# Patient Record
Sex: MALE | Race: BLACK OR AFRICAN AMERICAN | NOT HISPANIC OR LATINO
[De-identification: names, ages, dates, MRNs, and addresses within clinical notes are randomized per-mention and may not be internally consistent; named-entity substitution may affect disease eponyms.]

---

## 2015-05-18 RX ORDER — CARVEDILOL PHOSPHATE 80 MG/1
1 CAPSULE, EXTENDED RELEASE ORAL
Qty: 0 | Refills: 0 | DISCHARGE
Start: 2015-05-18

## 2018-01-18 ENCOUNTER — RESULT REVIEW (OUTPATIENT)
Age: 68
End: 2018-01-18

## 2018-02-02 ENCOUNTER — APPOINTMENT (OUTPATIENT)
Dept: VASCULAR SURGERY | Facility: CLINIC | Age: 68
End: 2018-02-02
Payer: MEDICARE

## 2018-02-02 VITALS
HEART RATE: 74 BPM | DIASTOLIC BLOOD PRESSURE: 82 MMHG | TEMPERATURE: 98.7 F | HEIGHT: 68 IN | WEIGHT: 143 LBS | BODY MASS INDEX: 21.67 KG/M2 | RESPIRATION RATE: 17 BRPM | SYSTOLIC BLOOD PRESSURE: 147 MMHG

## 2018-02-02 DIAGNOSIS — Z86.2 PERSONAL HISTORY OF DISEASES OF THE BLOOD AND BLOOD-FORMING ORGANS AND CERTAIN DISORDERS INVOLVING THE IMMUNE MECHANISM: ICD-10-CM

## 2018-02-02 DIAGNOSIS — Z87.09 PERSONAL HISTORY OF OTHER DISEASES OF THE RESPIRATORY SYSTEM: ICD-10-CM

## 2018-02-02 DIAGNOSIS — R51 HEADACHE: ICD-10-CM

## 2018-02-02 DIAGNOSIS — I10 ESSENTIAL (PRIMARY) HYPERTENSION: ICD-10-CM

## 2018-02-02 DIAGNOSIS — Z86.39 PERSONAL HISTORY OF OTHER ENDOCRINE, NUTRITIONAL AND METABOLIC DISEASE: ICD-10-CM

## 2018-02-02 PROCEDURE — 99204 OFFICE O/P NEW MOD 45 MIN: CPT

## 2018-02-02 RX ORDER — FOLIC ACID 1 MG/1
1 TABLET ORAL
Qty: 90 | Refills: 0 | Status: ACTIVE | COMMUNITY
Start: 2017-01-19

## 2018-02-02 RX ORDER — BUDESONIDE AND FORMOTEROL FUMARATE DIHYDRATE 160; 4.5 UG/1; UG/1
160-4.5 AEROSOL RESPIRATORY (INHALATION)
Qty: 10 | Refills: 0 | Status: ACTIVE | COMMUNITY
Start: 2017-12-21

## 2018-02-02 RX ORDER — ALBUTEROL SULFATE 108 UG/1
108 (90 BASE) AEROSOL, METERED RESPIRATORY (INHALATION)
Refills: 0 | Status: ACTIVE | COMMUNITY

## 2018-02-02 RX ORDER — ACETAMINOPHEN AND CODEINE 300; 30 MG/1; MG/1
300-30 TABLET ORAL
Qty: 60 | Refills: 0 | Status: ACTIVE | COMMUNITY
Start: 2018-01-17

## 2018-02-02 RX ORDER — ISOPROPYL ALCOHOL 70 ML/100ML
70 SWAB TOPICAL
Qty: 200 | Refills: 0 | Status: ACTIVE | COMMUNITY
Start: 2016-11-22

## 2018-02-02 RX ORDER — MONTELUKAST 10 MG/1
10 TABLET, FILM COATED ORAL
Qty: 90 | Refills: 0 | Status: ACTIVE | COMMUNITY
Start: 2017-10-05

## 2018-02-02 RX ORDER — DIPHENOXYLATE HYDROCHLORIDE AND ATROPINE SULFATE 2.5; .025 MG/1; MG/1
2.5-0.025 TABLET ORAL
Qty: 90 | Refills: 0 | Status: ACTIVE | COMMUNITY
Start: 2018-01-17

## 2018-02-02 RX ORDER — ATORVASTATIN CALCIUM 40 MG/1
40 TABLET, FILM COATED ORAL
Qty: 90 | Refills: 0 | Status: ACTIVE | COMMUNITY
Start: 2017-11-09

## 2018-02-02 RX ORDER — CARVEDILOL 6.25 MG/1
6.25 TABLET, FILM COATED ORAL
Qty: 180 | Refills: 0 | Status: ACTIVE | COMMUNITY
Start: 2017-08-17

## 2018-02-02 RX ORDER — LANCETS 28 GAUGE
EACH MISCELLANEOUS
Qty: 200 | Refills: 0 | Status: ACTIVE | COMMUNITY
Start: 2017-12-07

## 2018-02-02 RX ORDER — LATANOPROST/PF 0.005 %
0.01 DROPS OPHTHALMIC (EYE)
Qty: 2 | Refills: 0 | Status: ACTIVE | COMMUNITY
Start: 2017-11-16

## 2018-02-02 RX ORDER — KETOCONAZOLE 20 MG/G
2 CREAM TOPICAL
Qty: 15 | Refills: 0 | Status: ACTIVE | COMMUNITY
Start: 2017-12-21

## 2018-02-02 RX ORDER — CARBAMIDE PEROXIDE 6.5 %
6.5 DROPS OTIC (EAR)
Qty: 15 | Refills: 0 | Status: ACTIVE | COMMUNITY
Start: 2017-12-21

## 2018-03-13 ENCOUNTER — APPOINTMENT (OUTPATIENT)
Dept: GASTROENTEROLOGY | Facility: CLINIC | Age: 68
End: 2018-03-13
Payer: MEDICARE

## 2018-03-13 VITALS
DIASTOLIC BLOOD PRESSURE: 78 MMHG | BODY MASS INDEX: 21.67 KG/M2 | SYSTOLIC BLOOD PRESSURE: 144 MMHG | HEIGHT: 68 IN | TEMPERATURE: 97.8 F | RESPIRATION RATE: 16 BRPM | WEIGHT: 143 LBS | HEART RATE: 60 BPM

## 2018-03-13 DIAGNOSIS — K51.013: ICD-10-CM

## 2018-03-13 PROCEDURE — 99203 OFFICE O/P NEW LOW 30 MIN: CPT

## 2018-04-30 ENCOUNTER — OUTPATIENT (OUTPATIENT)
Dept: OUTPATIENT SERVICES | Facility: HOSPITAL | Age: 68
LOS: 1 days | End: 2018-04-30
Payer: COMMERCIAL

## 2018-04-30 ENCOUNTER — RESULT REVIEW (OUTPATIENT)
Age: 68
End: 2018-04-30

## 2018-04-30 DIAGNOSIS — K51.90 ULCERATIVE COLITIS, UNSPECIFIED, WITHOUT COMPLICATIONS: ICD-10-CM

## 2018-04-30 DIAGNOSIS — Z98.89 OTHER SPECIFIED POSTPROCEDURAL STATES: Chronic | ICD-10-CM

## 2018-04-30 LAB
C DIFF BY PCR RESULT: SIGNIFICANT CHANGE UP
C DIFF TOX GENS STL QL NAA+PROBE: SIGNIFICANT CHANGE UP

## 2018-04-30 PROCEDURE — 88305 TISSUE EXAM BY PATHOLOGIST: CPT | Mod: 26

## 2018-04-30 PROCEDURE — 87493 C DIFF AMPLIFIED PROBE: CPT

## 2018-04-30 PROCEDURE — 45331 SIGMOIDOSCOPY AND BIOPSY: CPT

## 2018-04-30 PROCEDURE — 88305 TISSUE EXAM BY PATHOLOGIST: CPT

## 2018-04-30 PROCEDURE — 45380 COLONOSCOPY AND BIOPSY: CPT

## 2018-05-09 ENCOUNTER — RESULT REVIEW (OUTPATIENT)
Age: 68
End: 2018-05-09

## 2018-05-17 ENCOUNTER — APPOINTMENT (OUTPATIENT)
Dept: GASTROENTEROLOGY | Facility: CLINIC | Age: 68
End: 2018-05-17
Payer: MEDICARE

## 2018-05-17 ENCOUNTER — LABORATORY RESULT (OUTPATIENT)
Age: 68
End: 2018-05-17

## 2018-05-17 VITALS
WEIGHT: 147 LBS | DIASTOLIC BLOOD PRESSURE: 78 MMHG | OXYGEN SATURATION: 97 % | TEMPERATURE: 98.7 F | SYSTOLIC BLOOD PRESSURE: 160 MMHG | HEART RATE: 65 BPM | BODY MASS INDEX: 22.35 KG/M2

## 2018-05-17 PROCEDURE — 99215 OFFICE O/P EST HI 40 MIN: CPT

## 2018-05-22 LAB
ADJUSTED MITOGEN: >10 IU/ML
ADJUSTED TB AG: 0.11 IU/ML
ALBUMIN SERPL ELPH-MCNC: 2.7 G/DL
ALP BLD-CCNC: 67 U/L
ALT SERPL-CCNC: 14 U/L
ANION GAP SERPL CALC-SCNC: 11 MMOL/L
AST SERPL-CCNC: 14 U/L
BASOPHILS # BLD AUTO: 0.05 K/UL
BASOPHILS NFR BLD AUTO: 0.8 %
BILIRUB SERPL-MCNC: 0.3 MG/DL
BUN SERPL-MCNC: 9 MG/DL
CALCIUM SERPL-MCNC: 8.4 MG/DL
CHLORIDE SERPL-SCNC: 102 MMOL/L
CO2 SERPL-SCNC: 27 MMOL/L
CREAT SERPL-MCNC: 0.8 MG/DL
EOSINOPHIL # BLD AUTO: 0.47 K/UL
EOSINOPHIL NFR BLD AUTO: 7.7 %
GLUCOSE SERPL-MCNC: 80 MG/DL
HBV CORE IGG+IGM SER QL: NONREACTIVE
HBV SURFACE AB SER QL: NONREACTIVE
HBV SURFACE AG SER QL: NONREACTIVE
HCT VFR BLD CALC: 34.5 %
HCV AB SER QL: NONREACTIVE
HCV S/CO RATIO: 0.2 S/CO
HGB BLD-MCNC: 9.8 G/DL
IMM GRANULOCYTES NFR BLD AUTO: 0.2 %
LYMPHOCYTES # BLD AUTO: 1.85 K/UL
LYMPHOCYTES NFR BLD AUTO: 30.1 %
M TB IFN-G BLD-IMP: NEGATIVE
MAN DIFF?: NORMAL
MCHC RBC-ENTMCNC: 20.5 PG
MCHC RBC-ENTMCNC: 28.4 GM/DL
MCV RBC AUTO: 72.2 FL
MONOCYTES # BLD AUTO: 0.59 K/UL
MONOCYTES NFR BLD AUTO: 9.6 %
NEUTROPHILS # BLD AUTO: 3.17 K/UL
NEUTROPHILS NFR BLD AUTO: 51.6 %
PLATELET # BLD AUTO: 535 K/UL
POTASSIUM SERPL-SCNC: 3.9 MMOL/L
PROT SERPL-MCNC: 7.2 G/DL
QUANTIFERON GOLD NIL: 0.04 IU/ML
RBC # BLD: 4.78 M/UL
RBC # FLD: 19.6 %
SODIUM SERPL-SCNC: 140 MMOL/L
WBC # FLD AUTO: 6.14 K/UL

## 2018-05-31 ENCOUNTER — APPOINTMENT (OUTPATIENT)
Dept: GASTROENTEROLOGY | Facility: CLINIC | Age: 68
End: 2018-05-31
Payer: MEDICARE

## 2018-05-31 VITALS
BODY MASS INDEX: 22.81 KG/M2 | TEMPERATURE: 98 F | OXYGEN SATURATION: 97 % | DIASTOLIC BLOOD PRESSURE: 79 MMHG | HEART RATE: 79 BPM | SYSTOLIC BLOOD PRESSURE: 146 MMHG | WEIGHT: 150 LBS

## 2018-05-31 PROCEDURE — 99214 OFFICE O/P EST MOD 30 MIN: CPT

## 2018-06-19 ENCOUNTER — APPOINTMENT (OUTPATIENT)
Dept: GASTROENTEROLOGY | Facility: CLINIC | Age: 68
End: 2018-06-19
Payer: MEDICARE

## 2018-06-19 VITALS
HEIGHT: 68 IN | RESPIRATION RATE: 16 BRPM | WEIGHT: 156 LBS | TEMPERATURE: 98.2 F | HEART RATE: 65 BPM | BODY MASS INDEX: 23.64 KG/M2 | DIASTOLIC BLOOD PRESSURE: 82 MMHG | SYSTOLIC BLOOD PRESSURE: 132 MMHG | OXYGEN SATURATION: 98 %

## 2018-06-19 PROCEDURE — 99214 OFFICE O/P EST MOD 30 MIN: CPT

## 2018-06-19 RX ORDER — PREDNISONE 10 MG/1
10 TABLET ORAL 3 TIMES DAILY
Qty: 90 | Refills: 0 | Status: DISCONTINUED | COMMUNITY
Start: 2018-06-19 | End: 2018-06-19

## 2018-06-19 RX ORDER — PREDNISONE 20 MG/1
20 TABLET ORAL DAILY
Qty: 28 | Refills: 0 | Status: DISCONTINUED | COMMUNITY
Start: 2018-05-31 | End: 2018-06-19

## 2018-06-22 LAB
C DIFF TOX GENS STL QL NAA+PROBE: NORMAL
CDIFF BY PCR: NOT DETECTED
DEPRECATED O AND P PREP STL: ABNORMAL

## 2018-06-25 ENCOUNTER — OTHER (OUTPATIENT)
Age: 68
End: 2018-06-25

## 2018-06-25 ENCOUNTER — FORM ENCOUNTER (OUTPATIENT)
Age: 68
End: 2018-06-25

## 2018-06-25 LAB — BACTERIA STL CULT: NORMAL

## 2018-06-26 ENCOUNTER — OUTPATIENT (OUTPATIENT)
Dept: OUTPATIENT SERVICES | Facility: HOSPITAL | Age: 68
LOS: 1 days | End: 2018-06-26
Payer: COMMERCIAL

## 2018-06-26 ENCOUNTER — APPOINTMENT (OUTPATIENT)
Dept: CT IMAGING | Facility: HOSPITAL | Age: 68
End: 2018-06-26
Payer: MEDICARE

## 2018-06-26 DIAGNOSIS — K51.90 ULCERATIVE COLITIS, UNSPECIFIED, WITHOUT COMPLICATIONS: ICD-10-CM

## 2018-06-26 DIAGNOSIS — Z98.89 OTHER SPECIFIED POSTPROCEDURAL STATES: Chronic | ICD-10-CM

## 2018-06-26 PROCEDURE — 74177 CT ABD & PELVIS W/CONTRAST: CPT

## 2018-06-26 PROCEDURE — 74177 CT ABD & PELVIS W/CONTRAST: CPT | Mod: 26

## 2018-07-24 ENCOUNTER — APPOINTMENT (OUTPATIENT)
Dept: GASTROENTEROLOGY | Facility: CLINIC | Age: 68
End: 2018-07-24
Payer: MEDICARE

## 2018-07-24 VITALS
SYSTOLIC BLOOD PRESSURE: 132 MMHG | HEIGHT: 68 IN | WEIGHT: 156 LBS | HEART RATE: 73 BPM | OXYGEN SATURATION: 95 % | TEMPERATURE: 98 F | BODY MASS INDEX: 23.64 KG/M2 | DIASTOLIC BLOOD PRESSURE: 88 MMHG

## 2018-07-24 PROCEDURE — 99214 OFFICE O/P EST MOD 30 MIN: CPT

## 2018-08-02 ENCOUNTER — MOBILE ON CALL (OUTPATIENT)
Age: 68
End: 2018-08-02

## 2018-12-20 ENCOUNTER — APPOINTMENT (OUTPATIENT)
Dept: GASTROENTEROLOGY | Facility: CLINIC | Age: 68
End: 2018-12-20
Payer: MEDICARE

## 2018-12-20 VITALS
DIASTOLIC BLOOD PRESSURE: 82 MMHG | HEART RATE: 84 BPM | WEIGHT: 145 LBS | SYSTOLIC BLOOD PRESSURE: 142 MMHG | TEMPERATURE: 98 F | OXYGEN SATURATION: 96 % | BODY MASS INDEX: 22.05 KG/M2

## 2018-12-20 PROCEDURE — 99214 OFFICE O/P EST MOD 30 MIN: CPT

## 2019-01-24 ENCOUNTER — APPOINTMENT (OUTPATIENT)
Dept: GASTROENTEROLOGY | Facility: CLINIC | Age: 69
End: 2019-01-24
Payer: MEDICARE

## 2019-01-24 VITALS
WEIGHT: 150 LBS | BODY MASS INDEX: 22.81 KG/M2 | SYSTOLIC BLOOD PRESSURE: 167 MMHG | TEMPERATURE: 98.2 F | HEART RATE: 79 BPM | DIASTOLIC BLOOD PRESSURE: 69 MMHG | OXYGEN SATURATION: 98 %

## 2019-01-24 PROCEDURE — 99214 OFFICE O/P EST MOD 30 MIN: CPT

## 2019-01-24 NOTE — HISTORY OF PRESENT ILLNESS
[de-identified] : 68 year old male presented for follow up.  On 5 mg of prednisone now. Xeljanz has been approved and he will start today.

## 2019-01-24 NOTE — ASSESSMENT
[FreeTextEntry1] : 68 year old male with sever UC improving on steroids. Was prescribed Humira however he was unable to afford the medication. We prescribed Xeljanz and he will start today.   Up to date on all vacines. I explained the risk of of the medication including certain cancer and immunosuppression. He will D.C Steroids one weeks after starting medication.

## 2019-03-05 ENCOUNTER — OTHER (OUTPATIENT)
Age: 69
End: 2019-03-05

## 2019-03-19 ENCOUNTER — APPOINTMENT (OUTPATIENT)
Dept: GASTROENTEROLOGY | Facility: CLINIC | Age: 69
End: 2019-03-19
Payer: MEDICARE

## 2019-03-19 ENCOUNTER — MESSAGE (OUTPATIENT)
Age: 69
End: 2019-03-19

## 2019-03-19 VITALS
SYSTOLIC BLOOD PRESSURE: 156 MMHG | HEIGHT: 68 IN | WEIGHT: 149 LBS | HEART RATE: 89 BPM | TEMPERATURE: 97.6 F | BODY MASS INDEX: 22.58 KG/M2 | DIASTOLIC BLOOD PRESSURE: 79 MMHG | OXYGEN SATURATION: 99 % | RESPIRATION RATE: 16 BRPM

## 2019-03-19 PROCEDURE — 99215 OFFICE O/P EST HI 40 MIN: CPT

## 2019-03-19 RX ORDER — TOFACITINIB 10 MG/1
10 TABLET, FILM COATED ORAL
Qty: 30 | Refills: 0 | Status: DISCONTINUED | COMMUNITY
Start: 2019-01-17 | End: 2019-03-19

## 2019-03-19 RX ORDER — ADALIMUMAB 40MG/0.8ML
40 KIT SUBCUTANEOUS
Qty: 12 | Refills: 0 | Status: DISCONTINUED | COMMUNITY
Start: 2018-05-31 | End: 2019-03-19

## 2019-03-19 RX ORDER — POLYETHYLENE GLYCOL 3350 17 G/17G
17 POWDER, FOR SOLUTION ORAL
Qty: 238 | Refills: 0 | Status: ACTIVE | COMMUNITY
Start: 2019-03-19 | End: 1900-01-01

## 2019-03-19 NOTE — PHYSICAL EXAM
[General Appearance - Alert] : alert [General Appearance - In No Acute Distress] : in no acute distress [Sclera] : the sclera and conjunctiva were normal [PERRL With Normal Accommodation] : pupils were equal in size, round, and reactive to light [Extraocular Movements] : extraocular movements were intact [Outer Ear] : the ears and nose were normal in appearance [Oropharynx] : the oropharynx was normal [Neck Appearance] : the appearance of the neck was normal [Neck Cervical Mass (___cm)] : no neck mass was observed [Thyroid Nodule] : there were no palpable thyroid nodules [Jugular Venous Distention Increased] : there was no jugular-venous distention [Thyroid Diffuse Enlargement] : the thyroid was not enlarged [Auscultation Breath Sounds / Voice Sounds] : lungs were clear to auscultation bilaterally [Heart Rate And Rhythm] : heart rate was normal and rhythm regular [Heart Sounds] : normal S1 and S2 [Murmurs] : no murmurs [Heart Sounds Gallop] : no gallops [Heart Sounds Pericardial Friction Rub] : no pericardial rub [Abdomen Soft] : soft [Bowel Sounds] : normal bowel sounds [Abdomen Tenderness] : non-tender [Abdomen Mass (___ Cm)] : no abdominal mass palpated [Cervical Lymph Nodes Enlarged Posterior Bilaterally] : posterior cervical [Cervical Lymph Nodes Enlarged Anterior Bilaterally] : anterior cervical [Supraclavicular Lymph Nodes Enlarged Bilaterally] : supraclavicular [No Spinal Tenderness] : no spinal tenderness [Nail Clubbing] : no clubbing  or cyanosis of the fingernails [] : no rash [Affect] : the affect was normal [Oriented To Time, Place, And Person] : oriented to person, place, and time [Impaired Insight] : insight and judgment were intact [FreeTextEntry1] : +Clubbing

## 2019-03-19 NOTE — ASSESSMENT
[FreeTextEntry1] : 69 year old male with chronic severe ulcerative colitis. \par \par Plan: Refer to IBD specialist \par Stop Xeljanz\par Start Uceris for now \par Repeat colonoscopy \par Labs today \par

## 2019-03-19 NOTE — HISTORY OF PRESENT ILLNESS
[de-identified] : In Summary Mr. Cerrato is a 69 year old male  with  a history of UC treated by [facility]. He came to me last me last year for a colonoscopy and I found severe colitis (see scanned colonoscopy). I offered Remicaide  but he wanted to try Humira.  We had a difficult time getting it paid for and finally I offered him Xeljanz which he was able to afford. He was on the loading dose and started having improvement in his symptoms but began to have chest pain. I lowered the dose but he continued to have headaches and chest pain and I told him to stop the meds.

## 2019-03-29 LAB
ALBUMIN SERPL ELPH-MCNC: 3.2 G/DL
ALP BLD-CCNC: 75 U/L
ALT SERPL-CCNC: 8 U/L
ANION GAP SERPL CALC-SCNC: 12 MMOL/L
AST SERPL-CCNC: 8 U/L
BASOPHILS # BLD AUTO: 0.03 K/UL
BASOPHILS NFR BLD AUTO: 0.5 %
BILIRUB SERPL-MCNC: 0.4 MG/DL
BUN SERPL-MCNC: 8 MG/DL
CALCIUM SERPL-MCNC: 8.5 MG/DL
CHLORIDE SERPL-SCNC: 104 MMOL/L
CO2 SERPL-SCNC: 26 MMOL/L
CREAT SERPL-MCNC: 0.89 MG/DL
CRP SERPL-MCNC: 1.77 MG/DL
EOSINOPHIL # BLD AUTO: 0.35 K/UL
EOSINOPHIL NFR BLD AUTO: 6 %
ERYTHROCYTE [SEDIMENTATION RATE] IN BLOOD BY WESTERGREN METHOD: 59 MM/HR
GLUCOSE SERPL-MCNC: 99 MG/DL
HCT VFR BLD CALC: 38.6 %
HGB BLD-MCNC: 10.8 G/DL
IMM GRANULOCYTES NFR BLD AUTO: 0.3 %
LYMPHOCYTES # BLD AUTO: 1.15 K/UL
LYMPHOCYTES NFR BLD AUTO: 19.8 %
MAN DIFF?: NORMAL
MCHC RBC-ENTMCNC: 22.2 PG
MCHC RBC-ENTMCNC: 28 GM/DL
MCV RBC AUTO: 79.4 FL
MONOCYTES # BLD AUTO: 0.71 K/UL
MONOCYTES NFR BLD AUTO: 12.2 %
NEUTROPHILS # BLD AUTO: 3.55 K/UL
NEUTROPHILS NFR BLD AUTO: 61.2 %
PLATELET # BLD AUTO: 423 K/UL
POTASSIUM SERPL-SCNC: 3.6 MMOL/L
PROT SERPL-MCNC: 6.8 G/DL
RBC # BLD: 4.86 M/UL
RBC # FLD: 17.9 %
SODIUM SERPL-SCNC: 142 MMOL/L
WBC # FLD AUTO: 5.81 K/UL

## 2019-04-09 LAB — CALPROTECTIN FECAL: 2080 UG/G

## 2019-09-06 ENCOUNTER — APPOINTMENT (OUTPATIENT)
Dept: GASTROENTEROLOGY | Facility: CLINIC | Age: 69
End: 2019-09-06
Payer: MEDICARE

## 2019-09-06 ENCOUNTER — LABORATORY RESULT (OUTPATIENT)
Age: 69
End: 2019-09-06

## 2019-09-06 VITALS
TEMPERATURE: 98.6 F | HEIGHT: 68 IN | SYSTOLIC BLOOD PRESSURE: 138 MMHG | BODY MASS INDEX: 23.49 KG/M2 | RESPIRATION RATE: 16 BRPM | DIASTOLIC BLOOD PRESSURE: 80 MMHG | WEIGHT: 155 LBS | OXYGEN SATURATION: 98 % | HEART RATE: 63 BPM

## 2019-09-06 PROCEDURE — 99204 OFFICE O/P NEW MOD 45 MIN: CPT

## 2019-09-06 PROCEDURE — 99214 OFFICE O/P EST MOD 30 MIN: CPT

## 2019-09-06 NOTE — PHYSICAL EXAM
[General Appearance - Alert] : alert [Sclera] : the sclera and conjunctiva were normal [General Appearance - In No Acute Distress] : in no acute distress [Extraocular Movements] : extraocular movements were intact [PERRL With Normal Accommodation] : pupils were equal in size, round, and reactive to light [Outer Ear] : the ears and nose were normal in appearance [Oropharynx] : the oropharynx was normal [Jugular Venous Distention Increased] : there was no jugular-venous distention [Neck Appearance] : the appearance of the neck was normal [Neck Cervical Mass (___cm)] : no neck mass was observed [Thyroid Diffuse Enlargement] : the thyroid was not enlarged [Thyroid Nodule] : there were no palpable thyroid nodules [Auscultation Breath Sounds / Voice Sounds] : lungs were clear to auscultation bilaterally [Murmurs] : no murmurs [Edema] : there was no peripheral edema [Bowel Sounds] : normal bowel sounds [Abdomen Soft] : soft [Abdomen Tenderness] : non-tender [Abdomen Mass (___ Cm)] : no abdominal mass palpated [Cervical Lymph Nodes Enlarged Posterior Bilaterally] : posterior cervical [Cervical Lymph Nodes Enlarged Anterior Bilaterally] : anterior cervical [Supraclavicular Lymph Nodes Enlarged Bilaterally] : supraclavicular [Axillary Lymph Nodes Enlarged Bilaterally] : axillary [Femoral Lymph Nodes Enlarged Bilaterally] : femoral [Inguinal Lymph Nodes Enlarged Bilaterally] : inguinal [No Spinal Tenderness] : no spinal tenderness [No CVA Tenderness] : no ~M costovertebral angle tenderness [Abnormal Walk] : normal gait [Nail Clubbing] : no clubbing  or cyanosis of the fingernails [Musculoskeletal - Swelling] : no joint swelling seen [Motor Tone] : muscle strength and tone were normal [Skin Color & Pigmentation] : normal skin color and pigmentation [Skin Turgor] : normal skin turgor [] : no rash [Oriented To Time, Place, And Person] : oriented to person, place, and time [Impaired Insight] : insight and judgment were intact [Affect] : the affect was normal

## 2019-09-09 LAB
ALBUMIN SERPL ELPH-MCNC: 3.2 G/DL
ALP BLD-CCNC: 84 U/L
ALT SERPL-CCNC: 16 U/L
ANION GAP SERPL CALC-SCNC: 11 MMOL/L
AST SERPL-CCNC: 13 U/L
BASOPHILS # BLD AUTO: 0.05 K/UL
BASOPHILS NFR BLD AUTO: 1 %
BILIRUB SERPL-MCNC: 0.5 MG/DL
BUN SERPL-MCNC: 8 MG/DL
CALCIUM SERPL-MCNC: 8.7 MG/DL
CHLORIDE SERPL-SCNC: 102 MMOL/L
CO2 SERPL-SCNC: 27 MMOL/L
CREAT SERPL-MCNC: 0.84 MG/DL
CRP SERPL-MCNC: 2.64 MG/DL
EOSINOPHIL # BLD AUTO: 0.48 K/UL
EOSINOPHIL NFR BLD AUTO: 9.5 %
GLUCOSE SERPL-MCNC: 83 MG/DL
HBV SURFACE AG SER QL: NONREACTIVE
HCT VFR BLD CALC: 41.5 %
HGB BLD-MCNC: 11.1 G/DL
IMM GRANULOCYTES NFR BLD AUTO: 0.2 %
IRON SATN MFR SERPL: 8 %
IRON SERPL-MCNC: 18 UG/DL
LYMPHOCYTES # BLD AUTO: 1.65 K/UL
LYMPHOCYTES NFR BLD AUTO: 32.7 %
MAN DIFF?: NORMAL
MCHC RBC-ENTMCNC: 20.3 PG
MCHC RBC-ENTMCNC: 26.7 GM/DL
MCV RBC AUTO: 75.7 FL
MONOCYTES # BLD AUTO: 0.37 K/UL
MONOCYTES NFR BLD AUTO: 7.3 %
NEUTROPHILS # BLD AUTO: 2.48 K/UL
NEUTROPHILS NFR BLD AUTO: 49.3 %
PLATELET # BLD AUTO: 395 K/UL
POTASSIUM SERPL-SCNC: 3.8 MMOL/L
PROT SERPL-MCNC: 7.2 G/DL
RBC # BLD: 5.48 M/UL
RBC # FLD: 24.8 %
SODIUM SERPL-SCNC: 140 MMOL/L
TIBC SERPL-MCNC: 229 UG/DL
UIBC SERPL-MCNC: 211 UG/DL
WBC # FLD AUTO: 5.04 K/UL

## 2019-09-10 LAB
M TB IFN-G BLD-IMP: NEGATIVE
QUANTIFERON TB PLUS MITOGEN MINUS NIL: 8.49 IU/ML
QUANTIFERON TB PLUS NIL: 0.02 IU/ML
QUANTIFERON TB PLUS TB1 MINUS NIL: 0.05 IU/ML
QUANTIFERON TB PLUS TB2 MINUS NIL: 0.02 IU/ML

## 2019-09-11 ENCOUNTER — MEDICATION RENEWAL (OUTPATIENT)
Age: 69
End: 2019-09-11

## 2019-11-04 ENCOUNTER — OUTPATIENT (OUTPATIENT)
Dept: OUTPATIENT SERVICES | Facility: HOSPITAL | Age: 69
LOS: 1 days | End: 2019-11-04
Payer: COMMERCIAL

## 2019-11-04 ENCOUNTER — APPOINTMENT (OUTPATIENT)
Dept: GASTROENTEROLOGY | Facility: HOSPITAL | Age: 69
End: 2019-11-04

## 2019-11-04 ENCOUNTER — RESULT REVIEW (OUTPATIENT)
Age: 69
End: 2019-11-04

## 2019-11-04 DIAGNOSIS — K51.90 ULCERATIVE COLITIS, UNSPECIFIED, WITHOUT COMPLICATIONS: ICD-10-CM

## 2019-11-04 DIAGNOSIS — Z98.89 OTHER SPECIFIED POSTPROCEDURAL STATES: Chronic | ICD-10-CM

## 2019-11-04 LAB — GLUCOSE BLDC GLUCOMTR-MCNC: 81 MG/DL — SIGNIFICANT CHANGE UP (ref 70–99)

## 2019-11-04 PROCEDURE — 88305 TISSUE EXAM BY PATHOLOGIST: CPT

## 2019-11-04 PROCEDURE — 88342 IMHCHEM/IMCYTCHM 1ST ANTB: CPT | Mod: 26

## 2019-11-04 PROCEDURE — 88305 TISSUE EXAM BY PATHOLOGIST: CPT | Mod: 26

## 2019-11-04 PROCEDURE — 45380 COLONOSCOPY AND BIOPSY: CPT | Mod: 52

## 2019-11-04 PROCEDURE — 88342 IMHCHEM/IMCYTCHM 1ST ANTB: CPT

## 2019-11-04 PROCEDURE — 45331 SIGMOIDOSCOPY AND BIOPSY: CPT

## 2019-11-04 PROCEDURE — 82962 GLUCOSE BLOOD TEST: CPT

## 2019-11-07 ENCOUNTER — MESSAGE (OUTPATIENT)
Age: 69
End: 2019-11-07

## 2019-11-07 LAB — SURGICAL PATHOLOGY STUDY: SIGNIFICANT CHANGE UP

## 2019-12-04 ENCOUNTER — APPOINTMENT (OUTPATIENT)
Dept: COLORECTAL SURGERY | Facility: CLINIC | Age: 69
End: 2019-12-04
Payer: MEDICARE

## 2019-12-04 VITALS
DIASTOLIC BLOOD PRESSURE: 81 MMHG | WEIGHT: 150 LBS | RESPIRATION RATE: 15 BRPM | BODY MASS INDEX: 22.73 KG/M2 | OXYGEN SATURATION: 99 % | HEIGHT: 68 IN | HEART RATE: 65 BPM | SYSTOLIC BLOOD PRESSURE: 165 MMHG

## 2019-12-04 DIAGNOSIS — Z86.79 PERSONAL HISTORY OF OTHER DISEASES OF THE CIRCULATORY SYSTEM: ICD-10-CM

## 2019-12-04 PROCEDURE — 99204 OFFICE O/P NEW MOD 45 MIN: CPT

## 2019-12-04 RX ORDER — SODIUM SULFATE, POTASSIUM SULFATE, MAGNESIUM SULFATE 17.5; 3.13; 1.6 G/ML; G/ML; G/ML
17.5-3.13-1.6 SOLUTION, CONCENTRATE ORAL
Qty: 1 | Refills: 0 | Status: DISCONTINUED | COMMUNITY
Start: 2019-09-06 | End: 2019-12-04

## 2019-12-04 RX ORDER — BUDESONIDE AND FORMOTEROL FUMARATE DIHYDRATE 160; 4.5 UG/1; UG/1
160-4.5 AEROSOL RESPIRATORY (INHALATION)
Refills: 0 | Status: DISCONTINUED | COMMUNITY
End: 2019-12-04

## 2019-12-04 RX ORDER — POLYETHYLENE GLYCOL-3350 AND ELECTROLYTES 236; 6.74; 5.86; 2.97; 22.74 G/274.31G; G/274.31G; G/274.31G; G/274.31G; G/274.31G
236 POWDER, FOR SOLUTION ORAL
Qty: 1 | Refills: 0 | Status: DISCONTINUED | COMMUNITY
Start: 2019-09-11 | End: 2019-12-04

## 2019-12-04 RX ORDER — MONTELUKAST 10 MG/1
10 TABLET, FILM COATED ORAL
Refills: 0 | Status: DISCONTINUED | COMMUNITY
End: 2019-12-04

## 2019-12-04 RX ORDER — CLINDAMYCIN HYDROCHLORIDE 300 MG/1
300 CAPSULE ORAL
Qty: 2 | Refills: 0 | Status: DISCONTINUED | COMMUNITY
Start: 2017-10-04 | End: 2019-12-04

## 2019-12-04 RX ORDER — IRBESARTAN 150 MG/1
150 TABLET ORAL
Refills: 0 | Status: ACTIVE | COMMUNITY

## 2019-12-04 RX ORDER — DIPHENOXYLATE HYDROCHLORIDE AND ATROPINE SULFATE 2.5; .025 MG/1; MG/1
2.5-0.025 TABLET ORAL
Refills: 0 | Status: DISCONTINUED | COMMUNITY
End: 2019-12-04

## 2019-12-04 RX ORDER — ENEMA 19; 7 G/133ML; G/133ML
7-19 ENEMA RECTAL
Qty: 1 | Refills: 0 | Status: DISCONTINUED | COMMUNITY
Start: 2018-03-13 | End: 2019-12-04

## 2019-12-04 RX ORDER — POLYETHYLENE GLYCOL 3350 17 G/17G
17 POWDER, FOR SOLUTION ORAL
Qty: 238 | Refills: 0 | Status: DISCONTINUED | COMMUNITY
Start: 2018-03-13 | End: 2019-12-04

## 2019-12-04 RX ORDER — BUDESONIDE 9 MG/1
9 TABLET, EXTENDED RELEASE ORAL DAILY
Qty: 30 | Refills: 2 | Status: DISCONTINUED | COMMUNITY
Start: 2019-03-19 | End: 2019-12-04

## 2019-12-04 RX ORDER — ADALIMUMAB 40MG/0.8ML
40 KIT SUBCUTANEOUS
Qty: 3 | Refills: 0 | Status: DISCONTINUED | COMMUNITY
Start: 2018-06-26 | End: 2019-12-04

## 2019-12-04 RX ORDER — PREDNISONE 10 MG/1
10 TABLET ORAL DAILY
Qty: 30 | Refills: 0 | Status: DISCONTINUED | COMMUNITY
Start: 2018-12-20 | End: 2019-12-04

## 2019-12-04 RX ORDER — CARVEDILOL 6.25 MG/1
6.25 TABLET, FILM COATED ORAL
Refills: 0 | Status: DISCONTINUED | COMMUNITY
End: 2019-12-04

## 2019-12-04 RX ORDER — PREDNISONE 10 MG/1
10 TABLET ORAL 3 TIMES DAILY
Qty: 90 | Refills: 0 | Status: DISCONTINUED | COMMUNITY
Start: 2018-06-19 | End: 2019-12-04

## 2019-12-04 NOTE — PHYSICAL EXAM
[No Rash or Lesion] : No rash or lesion [Alert] : alert [Oriented to Person] : oriented to person [Oriented to Place] : oriented to place [Oriented to Time] : oriented to time [Anxious] : anxious [de-identified] : NC/AT [de-identified] : Normal male [de-identified] : soft, NT/ND, +BS [de-identified] : YAKELIN/+ROM [de-identified] : Intact

## 2019-12-04 NOTE — CONSULT LETTER
[Dear  ___] : Dear  [unfilled], [Please see my note below.] : Please see my note below. [Sincerely,] : Sincerely, [Courtesy Letter:] : I had the pleasure of seeing your patient, [unfilled], in my office today. [FreeTextEntry3] : Tyrese De La Garza MD, FACS, FASCRS\par Colorectal Surgery\par [facility]\par Assistant Professor of Surgery [employer] [facility]\par [address] [place] [address], [address]\[place] [address]\par Tel: [phone] \par Cell: [phone] \par Email: [email or web address]\par

## 2019-12-04 NOTE — HISTORY OF PRESENT ILLNESS
[FreeTextEntry1] : Patient is a 68 yo male here to discuss his UC.  He states that he was diagnosed in 2014 and has lost about 70 pounds since then.  He has tried multiple medications including steroids that has not really worked.  He is currently on Lomotil and having about 10 bowel movements a day with most of them at night.  He admits to occasional incontinence with liquid stool. He denies any bleeding but does see occasional mucus.  He gets abdominal pain also usually at night.  The patient had an attempted colonoscopy recently that was only done up to ~30cm where there is a stricture. Biopsies done reveal no dysplasia.

## 2019-12-04 NOTE — REVIEW OF SYSTEMS
[Abdominal Pain] : abdominal pain [Negative] : Heme/Lymph [FreeTextEntry5] : HTN [FreeTextEntry6] : Asthma [FreeTextEntry7] : UC

## 2019-12-04 NOTE — ASSESSMENT
[FreeTextEntry1] : I discussed at length the options with the patient and his wife. \par He was offered Remicade by GI and he is in the process of initiating that therapy. I explained that it is not a guarantee to work and he may try it and still be in the same position he is now. I also explained that the biggest concern that I have, besides the stricture itself, is that there is no way of knowing if there is a cancer proximal to the stricture as it is unable to be evaluated. \par I also discussed with him the surgical options. \par 1. total proctocolectomy with permanent end ileostomy which would cure the disease and relieve the issue of the incontinence. One of his biggest complaints at this point is his frequent BMs and requiring lomotil.\par 2. total colectomy with ileorectal. I explained that his is not totally curative and he would still need frequent scopes and may still have inflammation and potential cancer in the remnant rectum, but he would not need a permanent ostomy.\par 3. TPC with ileoanal jpouch and temporary ileostomy. I explained that with this he may continue to have issues with frequent loose BMs, needing imodium/lomotil, and possibly incontinence. While the incontinence may be from the inflammation, there is no guarantee that he won't have trouble with a pouch. I also explained the possible complication of pouchitis. \par \par He wishes to think about his options and he will let me know when he decides how he wants to proceed.\par All of his and his wife's questions were answered

## 2020-02-19 ENCOUNTER — APPOINTMENT (OUTPATIENT)
Dept: RHEUMATOLOGY | Facility: CLINIC | Age: 70
End: 2020-02-19
Payer: MEDICARE

## 2020-02-19 PROCEDURE — 96413 CHEMO IV INFUSION 1 HR: CPT

## 2020-02-19 PROCEDURE — 96415 CHEMO IV INFUSION ADDL HR: CPT

## 2020-03-04 ENCOUNTER — APPOINTMENT (OUTPATIENT)
Dept: RHEUMATOLOGY | Facility: CLINIC | Age: 70
End: 2020-03-04
Payer: MEDICARE

## 2020-03-04 PROCEDURE — 96415 CHEMO IV INFUSION ADDL HR: CPT

## 2020-03-04 PROCEDURE — 96413 CHEMO IV INFUSION 1 HR: CPT

## 2020-04-01 ENCOUNTER — APPOINTMENT (OUTPATIENT)
Dept: RHEUMATOLOGY | Facility: CLINIC | Age: 70
End: 2020-04-01

## 2020-05-26 ENCOUNTER — APPOINTMENT (OUTPATIENT)
Dept: GASTROENTEROLOGY | Facility: CLINIC | Age: 70
End: 2020-05-26

## 2020-05-27 ENCOUNTER — APPOINTMENT (OUTPATIENT)
Dept: RHEUMATOLOGY | Facility: CLINIC | Age: 70
End: 2020-05-27

## 2020-05-27 PROCEDURE — 96415 CHEMO IV INFUSION ADDL HR: CPT

## 2020-05-27 PROCEDURE — 96413 CHEMO IV INFUSION 1 HR: CPT

## 2020-06-05 ENCOUNTER — APPOINTMENT (OUTPATIENT)
Dept: GASTROENTEROLOGY | Facility: CLINIC | Age: 70
End: 2020-06-05
Payer: MEDICARE

## 2020-06-05 DIAGNOSIS — K56.699 OTHER INTESTINAL OBSTRUCTION UNSPECIFIED AS TO PARTIAL VERSUS COMPLETE OBSTRUCTION: ICD-10-CM

## 2020-06-05 PROCEDURE — 99443: CPT

## 2020-06-10 ENCOUNTER — APPOINTMENT (OUTPATIENT)
Dept: RHEUMATOLOGY | Facility: CLINIC | Age: 70
End: 2020-06-10

## 2020-07-22 ENCOUNTER — LABORATORY RESULT (OUTPATIENT)
Age: 70
End: 2020-07-22

## 2020-07-22 PROCEDURE — 96413 CHEMO IV INFUSION 1 HR: CPT

## 2020-07-22 PROCEDURE — 96415 CHEMO IV INFUSION ADDL HR: CPT

## 2020-07-22 PROCEDURE — 36415 COLL VENOUS BLD VENIPUNCTURE: CPT

## 2020-09-16 ENCOUNTER — APPOINTMENT (OUTPATIENT)
Dept: RHEUMATOLOGY | Facility: CLINIC | Age: 70
End: 2020-09-16
Payer: MEDICARE

## 2020-09-16 PROCEDURE — 36415 COLL VENOUS BLD VENIPUNCTURE: CPT

## 2020-09-16 PROCEDURE — 96413 CHEMO IV INFUSION 1 HR: CPT

## 2020-09-16 PROCEDURE — 96415 CHEMO IV INFUSION ADDL HR: CPT

## 2020-09-17 LAB
ALBUMIN SERPL ELPH-MCNC: 3.1 G/DL
ALP BLD-CCNC: 102 U/L
ALT SERPL-CCNC: 22 U/L
ANION GAP SERPL CALC-SCNC: 17 MMOL/L
AST SERPL-CCNC: 20 U/L
BASOPHILS # BLD AUTO: 0.03 K/UL
BASOPHILS NFR BLD AUTO: 0.4 %
BILIRUB SERPL-MCNC: 0.5 MG/DL
BUN SERPL-MCNC: 7 MG/DL
CALCIUM SERPL-MCNC: 8.4 MG/DL
CHLORIDE SERPL-SCNC: 98 MMOL/L
CO2 SERPL-SCNC: 26 MMOL/L
CREAT SERPL-MCNC: 0.82 MG/DL
EOSINOPHIL # BLD AUTO: 0.33 K/UL
EOSINOPHIL NFR BLD AUTO: 4.9 %
GLUCOSE SERPL-MCNC: 36 MG/DL
HCT VFR BLD CALC: 42.4 %
HGB BLD-MCNC: 11.9 G/DL
IMM GRANULOCYTES NFR BLD AUTO: 0.3 %
LYMPHOCYTES # BLD AUTO: 1.64 K/UL
LYMPHOCYTES NFR BLD AUTO: 24.2 %
MAN DIFF?: NORMAL
MCHC RBC-ENTMCNC: 23 PG
MCHC RBC-ENTMCNC: 28.1 GM/DL
MCV RBC AUTO: 82 FL
MONOCYTES # BLD AUTO: 0.59 K/UL
MONOCYTES NFR BLD AUTO: 8.7 %
NEUTROPHILS # BLD AUTO: 4.16 K/UL
NEUTROPHILS NFR BLD AUTO: 61.5 %
PLATELET # BLD AUTO: 345 K/UL
POTASSIUM SERPL-SCNC: 3.4 MMOL/L
PROT SERPL-MCNC: 7 G/DL
RBC # BLD: 5.17 M/UL
RBC # FLD: 17.2 %
SODIUM SERPL-SCNC: 142 MMOL/L
WBC # FLD AUTO: 6.77 K/UL

## 2020-11-11 ENCOUNTER — LABORATORY RESULT (OUTPATIENT)
Age: 70
End: 2020-11-11

## 2020-11-11 ENCOUNTER — APPOINTMENT (OUTPATIENT)
Dept: RHEUMATOLOGY | Facility: CLINIC | Age: 70
End: 2020-11-11
Payer: MEDICARE

## 2020-11-11 PROCEDURE — 36415 COLL VENOUS BLD VENIPUNCTURE: CPT

## 2020-11-11 PROCEDURE — 99072 ADDL SUPL MATRL&STAF TM PHE: CPT

## 2020-11-11 PROCEDURE — 96415 CHEMO IV INFUSION ADDL HR: CPT

## 2020-11-11 PROCEDURE — 96413 CHEMO IV INFUSION 1 HR: CPT

## 2020-11-12 ENCOUNTER — APPOINTMENT (OUTPATIENT)
Dept: GASTROENTEROLOGY | Facility: CLINIC | Age: 70
End: 2020-11-12
Payer: MEDICARE

## 2020-11-12 VITALS
WEIGHT: 150 LBS | DIASTOLIC BLOOD PRESSURE: 90 MMHG | RESPIRATION RATE: 16 BRPM | HEART RATE: 67 BPM | TEMPERATURE: 97.8 F | HEIGHT: 68 IN | SYSTOLIC BLOOD PRESSURE: 142 MMHG | BODY MASS INDEX: 22.73 KG/M2

## 2020-11-12 DIAGNOSIS — K51.90 ULCERATIVE COLITIS, UNSPECIFIED, W/OUT COMPLICATIONS: ICD-10-CM

## 2020-11-12 PROCEDURE — 99214 OFFICE O/P EST MOD 30 MIN: CPT

## 2020-11-12 PROCEDURE — 99072 ADDL SUPL MATRL&STAF TM PHE: CPT

## 2020-11-12 RX ORDER — METHOTREXATE 2.5 MG/1
2.5 TABLET ORAL
Qty: 48 | Refills: 1 | Status: DISCONTINUED | COMMUNITY
Start: 2020-08-03 | End: 2020-11-12

## 2020-11-12 RX ORDER — SODIUM SULFATE, POTASSIUM SULFATE, MAGNESIUM SULFATE 17.5; 3.13; 1.6 G/ML; G/ML; G/ML
17.5-3.13-1.6 SOLUTION, CONCENTRATE ORAL
Qty: 1 | Refills: 0 | Status: ACTIVE | COMMUNITY
Start: 2020-11-12 | End: 1900-01-01

## 2020-11-12 NOTE — ASSESSMENT
[FreeTextEntry1] : UC\par ? improvement on remicade\par DUE for colonoscopy, reevaluate disease activity, stricture\par \par Plan\par Indications risks benefits alternatives to colonoscopy reviewed\par patient agrees\par

## 2020-11-12 NOTE — PHYSICAL EXAM
[General Appearance - Alert] : alert [General Appearance - In No Acute Distress] : in no acute distress [Sclera] : the sclera and conjunctiva were normal [PERRL With Normal Accommodation] : pupils were equal in size, round, and reactive to light [Extraocular Movements] : extraocular movements were intact [Bowel Sounds] : normal bowel sounds [Abdomen Soft] : soft [Abdomen Tenderness] : non-tender [] : no hepato-splenomegaly [Abdomen Mass (___ Cm)] : no abdominal mass palpated [Oriented To Time, Place, And Person] : oriented to person, place, and time [Impaired Insight] : insight and judgment were intact [Affect] : the affect was normal

## 2020-11-12 NOTE — HISTORY OF PRESENT ILLNESS
[de-identified] : dictation inactive\par \par 70 yr male, UC with stricture at 30cm\par On remicade for almost a year\par Stools more formed, little blood, still frequent in the evenings\par Lomotil in the morning\par Last remicade level undetectable with intermediate antibody\par TRIED methotrexate once, made him weak, drowsy, stopped

## 2021-01-02 NOTE — ASSESSMENT
Progress Note - Ponce Castillo 1943, 65 y o  female MRN: [id number]    Unit/Bed#: -01 Encounter: [id number]    Primary Care Provider: No primary care provider on file  Date and time admitted to hospital: 12/24/2020 12:01 PM        COVID-19  Assessment & Plan  See above    Elevated d-dimer  Assessment & Plan  Patient had elevated D-dimer, was on heparin 5000 q 8 hours  Venous duplex negative for DVT , was transition to eliquis 2 5 BID per covid protocol    Metabolic encephalopathy  Assessment & Plan  Patient had hypernatremia, acute kidney injury, leading to metabolic encephalopathy  At baseline patient is alert awake oriented x3  CT of the head is negative  Improving  Alzheimer's disease [facility])  Assessment & Plan  Patient has baseline dementia  But alert awake oriented x3 at baseline  Unable to assess orientation, patient will not answer any questions  Psychiatry on board- Continue remeron 15 mg daily   Continue dementia care  Hypertension  Assessment & Plan  Patient has history of hypertension, losartan was on hold due to kidney injury, creatinine is better now  We will resume losartan 25 mg daily         * Pneumonia due to COVID-19 virus  Assessment & Plan  Patient was found COVID positive on December 14, 2020  She was started with Decadron and multivitamins at nursing home  Her conditions got worse, she was found to have low oxygen saturation  Nursing home staff told that patient is not eating or drinking anything for couple of days  Chest x-ray suggestive of pneumonia  Patient completed antibiotic course  Continue anticoagulation, steroids, multivitamins as per protocol  Plasma given, remdesivir was held due to kidney injury  Self Proning  Patient is on room air, chest clear to auscultation  Continue to monitor closely     Patient stable medically but needs placement  CM on board         LARA (acute kidney injury) (HCC)resolved as of 12/31/2020  Assessment [FreeTextEntry1] : Moderate to severely active ulcerative colitis\par Numerous prior drug therapies\par History of stricturing disease cannot rule out neoplasm\par \par Plan\par Repeat blood testing as ordered\par Indications risks benefits and alternatives to colonoscopy reviewed\par Patient agreeable to examination\par No evidence of malignancy, consideration for biologic therapies including Humira, Remicade, entyvio & Plan  Creatinine on admission on , likely prerenal because of inadequate p o  Intake as reported by the nursing home  Improved  to 1  Creatinine is normal now      Hypernatremiaresolved as of 2021  Assessment & Plan  Sodium was 152 on admission likely secondary to poor oral intake  Today sodium is WNL  Trend BMP tomorrow    Acute hypoxic respiratory failureresolved as of 2020  Assessment & Plan  COVID 19 on Dec 14, 2020  Patient got diagnosed with COVID-19 on 2020  After that she was started on multivitamins and Decadron in [facility]  Patient got worse over time  Patient was in acute hypoxic respiratory failure secondary to COVID-19 pneumonia  Currently she is saturating well on room air        VTE Pharmacologic Prophylaxis:   Pharmacologic: Apixaban (Eliquis)  Mechanical VTE Prophylaxis in Place: Yes    Discussions with Specialists or Other Care Team Provider: yes    Education and Discussions with Family / Patient: yes    Current Length of Stay: 9 day(s)    Current Patient Status: Inpatient     Discharge Plan / Estimated Discharge Date: n/a    Code Status: Level 1 - Full Code      Subjective:   Patient is alert, however she did not answer any of my questions this Monday  Does not appear to be in any distress  No overnight events  Remains afebrile  Oral intake is still poor  Objective:     Vitals:   Temp (24hrs), Av 9 °F (36 6 °C), Min:97 2 °F (36 2 °C), Max:98 3 °F (36 8 °C)    Temp:  [97 2 °F (36 2 °C)-98 3 °F (36 8 °C)] 97 2 °F (36 2 °C)  HR:  [55-86] 76  Resp:  [14-16] 16  BP: (112-130)/(68-79) 130/79  SpO2:  [94 %-100 %] 96 %  Body mass index is 24 91 kg/m²  Input and Output Summary (last 24 hours): Intake/Output Summary (Last 24 hours) at 2021 1232  Last data filed at 2021 1255  Gross per 24 hour   Intake 120 ml   Output    Net 120 ml       Physical Exam:     Physical Exam  HENT:      Head: Normocephalic     Eyes:      Extraocular Movements: Extraocular movements intact  Cardiovascular:      Rate and Rhythm: Normal rate  Pulses: Normal pulses  Heart sounds: Normal heart sounds  Pulmonary:      Effort: No respiratory distress  Breath sounds: No stridor  No wheezing, rhonchi or rales  Abdominal:      Tenderness: There is no abdominal tenderness  There is no guarding  Musculoskeletal:      Right lower leg: No edema  Left lower leg: No edema  Skin:     General: Skin is warm  Neurological:      Mental Status: She is alert  She is disoriented  GCS: GCS eye subscore is 4  GCS verbal subscore is 3  GCS motor subscore is 6  Psychiatric:         Mood and Affect: Mood is depressed  Speech: She is noncommunicative  Additional Data:     Labs:    Results from last 7 days   Lab Units 01/02/21  0437   WBC Thousand/uL 11 38*   HEMOGLOBIN g/dL 13 3   HEMATOCRIT % 41 8   PLATELETS Thousands/uL 414*   NEUTROS PCT % 85*   LYMPHS PCT % 9*   MONOS PCT % 5   EOS PCT % 0     Results from last 7 days   Lab Units 01/02/21  0437   POTASSIUM mmol/L 4 7   CHLORIDE mmol/L 108   CO2 mmol/L 25   BUN mg/dL 32*   CREATININE mg/dL 0 89   CALCIUM mg/dL 9 2   ALK PHOS U/L 57 5   ALT U/L 112*   AST U/L 38           * I Have Reviewed All Lab Data Listed Above  * Additional Pertinent Lab Tests Reviewed:  All Labs Within Last 24 Hours Reviewed      Recent Cultures (last 7 days):           Last 24 Hours Medication List:   Current Facility-Administered Medications   Medication Dose Route Frequency Provider Last Rate    acetaminophen  650 mg Oral Q6H PRN Glory Henriquez MD      aluminum-magnesium hydroxide-simethicone  30 mL Oral Q6H PRN Glory Henriquez MD      apixaban  2 5 mg Oral BID Glory Henriquez MD      bisacodyl  10 mg Rectal Daily PRN Glory Henriquez MD      cholecalciferol  2,000 Units Oral Daily Glory Henriquez MD      dexamethasone  6 mg Intravenous Q24H Glory Henriquez MD      famotidine  20 mg Oral BID Glory Henriquez MD     [facility] losartan  25 mg Oral Daily Teofilo Alfonso MD      mirtazapine  15 mg Oral HS Gil Claudio MD      multivitamin-minerals  1 tablet Oral Daily Glory Henriquez MD      ondansetron  4 mg Intravenous Q6H PRN Glory Henriquez MD          Today, Patient Was Seen By: Job Hill MD    ** Please Note: This note has been constructed using a voice recognition system   **

## 2021-01-06 ENCOUNTER — APPOINTMENT (OUTPATIENT)
Dept: RHEUMATOLOGY | Facility: CLINIC | Age: 71
End: 2021-01-06
Payer: MEDICARE

## 2021-01-06 PROCEDURE — 96413 CHEMO IV INFUSION 1 HR: CPT

## 2021-01-06 PROCEDURE — 96415 CHEMO IV INFUSION ADDL HR: CPT

## 2021-01-06 PROCEDURE — 99072 ADDL SUPL MATRL&STAF TM PHE: CPT

## 2021-01-06 RX ORDER — ACETAMINOPHEN 325 MG/1
325 TABLET ORAL
Qty: 2 | Refills: 0 | Status: ACTIVE | OUTPATIENT
Start: 2019-11-07

## 2021-01-06 RX ORDER — CETIRIZINE HYDROCHLORIDE 10 MG/1
10 TABLET, FILM COATED ORAL
Qty: 1 | Refills: 0 | Status: ACTIVE | OUTPATIENT
Start: 2019-11-07

## 2021-01-06 RX ORDER — INFLIXIMAB 100 MG/10ML
100 INJECTION, POWDER, LYOPHILIZED, FOR SOLUTION INTRAVENOUS
Qty: 4 | Refills: 0 | Status: ACTIVE | OUTPATIENT
Start: 2019-11-07

## 2021-02-26 ENCOUNTER — NON-APPOINTMENT (OUTPATIENT)
Age: 71
End: 2021-02-26

## 2021-03-03 ENCOUNTER — APPOINTMENT (OUTPATIENT)
Dept: RHEUMATOLOGY | Facility: CLINIC | Age: 71
End: 2021-03-03

## 2021-03-04 ENCOUNTER — APPOINTMENT (OUTPATIENT)
Dept: RHEUMATOLOGY | Facility: CLINIC | Age: 71
End: 2021-03-04

## 2021-04-02 ENCOUNTER — NON-APPOINTMENT (OUTPATIENT)
Age: 71
End: 2021-04-02

## 2021-04-29 ENCOUNTER — APPOINTMENT (OUTPATIENT)
Dept: RHEUMATOLOGY | Facility: CLINIC | Age: 71
End: 2021-04-29

## 2021-06-24 ENCOUNTER — APPOINTMENT (OUTPATIENT)
Dept: RHEUMATOLOGY | Facility: CLINIC | Age: 71
End: 2021-06-24

## 2021-12-17 ENCOUNTER — INPATIENT (INPATIENT)
Facility: HOSPITAL | Age: 71
LOS: 13 days | Discharge: NURSING, REHAB OR LONG-TERM CARE | End: 2021-12-31
Attending: HOSPITALIST | Admitting: HOSPITALIST
Payer: MEDICARE

## 2021-12-17 VITALS
RESPIRATION RATE: 16 BRPM | DIASTOLIC BLOOD PRESSURE: 89 MMHG | HEIGHT: 68 IN | HEART RATE: 74 BPM | SYSTOLIC BLOOD PRESSURE: 161 MMHG | TEMPERATURE: 99 F | OXYGEN SATURATION: 97 %

## 2021-12-17 DIAGNOSIS — Z98.89 OTHER SPECIFIED POSTPROCEDURAL STATES: Chronic | ICD-10-CM

## 2021-12-17 DIAGNOSIS — H60.22 MALIGNANT OTITIS EXTERNA, LEFT EAR: ICD-10-CM

## 2021-12-17 LAB
A1C WITH ESTIMATED AVERAGE GLUCOSE RESULT: 6.2 % — HIGH (ref 4–5.6)
ALBUMIN SERPL ELPH-MCNC: 3.2 G/DL — LOW (ref 3.3–5)
ALP SERPL-CCNC: 119 U/L — SIGNIFICANT CHANGE UP (ref 40–120)
ALT FLD-CCNC: 15 U/L — SIGNIFICANT CHANGE UP (ref 4–41)
ANION GAP SERPL CALC-SCNC: 10 MMOL/L — SIGNIFICANT CHANGE UP (ref 7–14)
ANISOCYTOSIS BLD QL: SLIGHT — SIGNIFICANT CHANGE UP
AST SERPL-CCNC: 21 U/L — SIGNIFICANT CHANGE UP (ref 4–40)
BASOPHILS # BLD AUTO: 0 K/UL — SIGNIFICANT CHANGE UP (ref 0–0.2)
BASOPHILS NFR BLD AUTO: 0 % — SIGNIFICANT CHANGE UP (ref 0–2)
BILIRUB SERPL-MCNC: 0.2 MG/DL — SIGNIFICANT CHANGE UP (ref 0.2–1.2)
BUN SERPL-MCNC: 10 MG/DL — SIGNIFICANT CHANGE UP (ref 7–23)
CALCIUM SERPL-MCNC: 9 MG/DL — SIGNIFICANT CHANGE UP (ref 8.4–10.5)
CHLORIDE SERPL-SCNC: 98 MMOL/L — SIGNIFICANT CHANGE UP (ref 98–107)
CO2 SERPL-SCNC: 27 MMOL/L — SIGNIFICANT CHANGE UP (ref 22–31)
CREAT SERPL-MCNC: 1.12 MG/DL — SIGNIFICANT CHANGE UP (ref 0.5–1.3)
EOSINOPHIL # BLD AUTO: 0.51 K/UL — HIGH (ref 0–0.5)
EOSINOPHIL NFR BLD AUTO: 10.7 % — HIGH (ref 0–6)
ERYTHROCYTE [SEDIMENTATION RATE] IN BLOOD: 72 MM/HR — HIGH (ref 1–15)
ESTIMATED AVERAGE GLUCOSE: 131 — SIGNIFICANT CHANGE UP
GLUCOSE SERPL-MCNC: 91 MG/DL — SIGNIFICANT CHANGE UP (ref 70–99)
HCT VFR BLD CALC: 36.6 % — LOW (ref 39–50)
HGB BLD-MCNC: 10.3 G/DL — LOW (ref 13–17)
HYPOCHROMIA BLD QL: SLIGHT — SIGNIFICANT CHANGE UP
IANC: 2.57 K/UL — SIGNIFICANT CHANGE UP (ref 1.5–8.5)
LYMPHOCYTES # BLD AUTO: 1.01 K/UL — SIGNIFICANT CHANGE UP (ref 1–3.3)
LYMPHOCYTES # BLD AUTO: 21.4 % — SIGNIFICANT CHANGE UP (ref 13–44)
MCHC RBC-ENTMCNC: 19.8 PG — LOW (ref 27–34)
MCHC RBC-ENTMCNC: 28.1 GM/DL — LOW (ref 32–36)
MCV RBC AUTO: 70.5 FL — LOW (ref 80–100)
MONOCYTES # BLD AUTO: 0.46 K/UL — SIGNIFICANT CHANGE UP (ref 0–0.9)
MONOCYTES NFR BLD AUTO: 9.8 % — SIGNIFICANT CHANGE UP (ref 2–14)
NEUTROPHILS # BLD AUTO: 2.5 K/UL — SIGNIFICANT CHANGE UP (ref 1.8–7.4)
NEUTROPHILS NFR BLD AUTO: 52.7 % — SIGNIFICANT CHANGE UP (ref 43–77)
PLAT MORPH BLD: NORMAL — SIGNIFICANT CHANGE UP
PLATELET # BLD AUTO: 544 K/UL — HIGH (ref 150–400)
PLATELET COUNT - ESTIMATE: ABNORMAL
POIKILOCYTOSIS BLD QL AUTO: SIGNIFICANT CHANGE UP
POLYCHROMASIA BLD QL SMEAR: SLIGHT — SIGNIFICANT CHANGE UP
POTASSIUM SERPL-MCNC: 4.3 MMOL/L — SIGNIFICANT CHANGE UP (ref 3.5–5.3)
POTASSIUM SERPL-SCNC: 4.3 MMOL/L — SIGNIFICANT CHANGE UP (ref 3.5–5.3)
PROT SERPL-MCNC: 8.6 G/DL — HIGH (ref 6–8.3)
RBC # BLD: 5.19 M/UL — SIGNIFICANT CHANGE UP (ref 4.2–5.8)
RBC # FLD: 19.9 % — HIGH (ref 10.3–14.5)
RBC BLD AUTO: ABNORMAL
SARS-COV-2 RNA SPEC QL NAA+PROBE: SIGNIFICANT CHANGE UP
SMUDGE CELLS # BLD: PRESENT — SIGNIFICANT CHANGE UP
SODIUM SERPL-SCNC: 135 MMOL/L — SIGNIFICANT CHANGE UP (ref 135–145)
VARIANT LYMPHS # BLD: 5.4 % — SIGNIFICANT CHANGE UP (ref 0–6)
WBC # BLD: 4.74 K/UL — SIGNIFICANT CHANGE UP (ref 3.8–10.5)
WBC # FLD AUTO: 4.74 K/UL — SIGNIFICANT CHANGE UP (ref 3.8–10.5)

## 2021-12-17 PROCEDURE — 99285 EMERGENCY DEPT VISIT HI MDM: CPT

## 2021-12-17 PROCEDURE — 70481 CT ORBIT/EAR/FOSSA W/DYE: CPT | Mod: 26,MA,59

## 2021-12-17 PROCEDURE — 99223 1ST HOSP IP/OBS HIGH 75: CPT

## 2021-12-17 PROCEDURE — 70450 CT HEAD/BRAIN W/O DYE: CPT | Mod: 26,MA

## 2021-12-17 RX ORDER — CIPROFLOXACIN LACTATE 400MG/40ML
400 VIAL (ML) INTRAVENOUS ONCE
Refills: 0 | Status: COMPLETED | OUTPATIENT
Start: 2021-12-17 | End: 2021-12-17

## 2021-12-17 RX ORDER — METOCLOPRAMIDE HCL 10 MG
10 TABLET ORAL ONCE
Refills: 0 | Status: COMPLETED | OUTPATIENT
Start: 2021-12-17 | End: 2021-12-17

## 2021-12-17 RX ORDER — CIPROFLOXACIN AND DEXAMETHASONE 3; 1 MG/ML; MG/ML
4 SUSPENSION/ DROPS AURICULAR (OTIC) ONCE
Refills: 0 | Status: COMPLETED | OUTPATIENT
Start: 2021-12-17 | End: 2021-12-17

## 2021-12-17 RX ORDER — CIPROFLOXACIN AND DEXAMETHASONE 3; 1 MG/ML; MG/ML
4 SUSPENSION/ DROPS AURICULAR (OTIC) ONCE
Refills: 0 | Status: DISCONTINUED | OUTPATIENT
Start: 2021-12-17 | End: 2021-12-17

## 2021-12-17 RX ORDER — SODIUM CHLORIDE 9 MG/ML
1000 INJECTION INTRAMUSCULAR; INTRAVENOUS; SUBCUTANEOUS ONCE
Refills: 0 | Status: COMPLETED | OUTPATIENT
Start: 2021-12-17 | End: 2021-12-17

## 2021-12-17 RX ADMIN — Medication 200 MILLIGRAM(S): at 23:15

## 2021-12-17 RX ADMIN — SODIUM CHLORIDE 1000 MILLILITER(S): 9 INJECTION INTRAMUSCULAR; INTRAVENOUS; SUBCUTANEOUS at 17:58

## 2021-12-17 RX ADMIN — CIPROFLOXACIN AND DEXAMETHASONE 4 DROP(S): 3; 1 SUSPENSION/ DROPS AURICULAR (OTIC) at 22:42

## 2021-12-17 RX ADMIN — Medication 10 MILLIGRAM(S): at 17:58

## 2021-12-17 RX ADMIN — Medication 100 MILLIGRAM(S): at 19:02

## 2021-12-17 NOTE — ED PROVIDER NOTE - CARE PLAN
Principal Discharge DX:	Malignant otitis externa of left ear   1 Principal Discharge DX:	Malignant otitis externa of left ear  Secondary Diagnosis:	Dizziness

## 2021-12-17 NOTE — ED PROVIDER NOTE - OBJECTIVE STATEMENT
70 y/o m hx HTN, HLD, chf, copd, per chart DM though patient not on hyperglycemics? here now with L ear pain and swelling x1 week, which is the second episode of the same, the first of which occurred in november and was treated with bactrim. now also with bilateral red eyes and pain with movement, dizziness, worse with movement though sometimes at rest. no recorded fevers, but does feel diffusely weak.

## 2021-12-17 NOTE — CONSULT NOTE ADULT - SUBJECTIVE AND OBJECTIVE BOX
HPI:  70 y/o m hx HTN, HLD, chf, copd, DM not on hyperglycemics here now with L ear pain and swelling x1 month. First started in November and was treated with bactrim. Now also with bilateral red eyes and pain with movement, dizziness, worse with movement though sometimes at rest. No recorded fevers, but does feel diffusely weak. No history of ear infections or ear surgery. Denies changes to facial movement. Complaining of decreased hearing R>L. Denies otorrhea.    Allergies    penicillin (Rash)    Intolerances        PAST MEDICAL & SURGICAL HISTORY:  CHF (Congestive Heart Failure)    COPD (Chronic Obstructive Pulmonary Disease)    Dyslipidemia    HTN (Hypertension)    S/P Cardiac Catheterization  2008-[facility] no stents    Asthma    Colitis    Hemorrhoid    Atrial fibrillation    Diabetes mellitus  type 2    History of Total Knee Replacement  bilateral    History of Appendectomy    S/P tonsillectomy      Vital Signs Last 24 Hrs  T(C): 36.7 (17 Dec 2021 19:07), Max: 37.2 (17 Dec 2021 14:52)  T(F): 98.1 (17 Dec 2021 19:07), Max: 99 (17 Dec 2021 14:52)  HR: 78 (17 Dec 2021 19:07) (62 - 78)  BP: 148/91 (17 Dec 2021 19:07) (113/76 - 161/89)  BP(mean): --  RR: 16 (17 Dec 2021 19:07) (16 - 16)  SpO2: 99% (17 Dec 2021 19:07) (97% - 99%)    LABS:  CBC-    12-17    135  |  98  |  10  ----------------------------<  91  4.3   |  27  |  1.12    Ca    9.0      17 Dec 2021 17:50    TPro  8.6<H>  /  Alb  3.2<L>  /  TBili  0.2  /  DBili  x   /  AST  21  /  ALT  15  /  AlkPhos  119  12-17    Coagulation Studies-    Endocrine Panel-  --  --  9.0 mg/dL        PHYSICAL EXAM:    ENT EXAM-   Constitutional: Well-developed, well-nourished.  No hoarseness.     Head:  normocephalic, atraumatic.   Eyes: conjunctiva injected bilatearally  Ears: AD: canal normal, minimal cerumen, TM intact without effusion. AS: pinna erythematous with edematous cartilage, EAC patent, cerumen removed revealing intact TM with no effusion. No granulation tissue in EAC. No mastoid tenderness bilaterally.  Nose:  No external deformity  OC/OP:  Floor of mouth, buccal mucosa, lips, hard palate, soft palate, uvula, posterior pharyngeal wall normal.  Mucosa moist.  Neck:  Trachea midline.  Thyroid, parotid and submandibular glands normal.  Lymph:  No cervical adenopathy.    MULTISYSTEM EXAM-  Neuro/Psych:  A&O x 3.    Cranial nerves: 2-12 grossly intact bilaterally.  Pulm:  No dyspnea, non-labored breathing  Cardiovascular:  No periphreal edema.  Skin:  No rash or lesions on exposed skin of head/neck    RADIOLOGY & ADDITIONAL STUDIES:    ACC: [id number] EXAM:  CT ORBITS IC ACC: [id number] EXAM:  CT IAC ACC: [id number] EXAM:  CT BRAIN PROCEDURE DATE:  12/17/2021 INTERPRETATION:  CT BRAIN, ORBITS AND INTERNAL AUDITORY CANALS WITH AND WITHOUT CONTRAST INDICATIONS:  70 y/o m   HTN, HLD, chf, copd, per chart DM  patient not on hyperglycemics?  L ear pain and swelling x1 week,  second episode of the same,  first occurred in november and was treated with bactrim. now also with bilateral red eyes and pain with movement, dizziness, worse with movement though sometimes at rest. no recorded fevers, but does feel diffusely weak TECHNIQUE:  Serial axial images were obtained from the skull base to the vertex without the use of contrast. In addition contiguous serial axial images were obtained through the orbits and internal auditory canals after intravenous administration of 90 cc Omnipaque 350, with 10 cc discarded and coronal reformatted images through the orbits and internal auditory canals were performed. Dedicated 3-D images were made using dedicated 3D software and viewed on a dedicated 3D workstation in multiple planes. COMPARISON EXAM: White blood cell scan, 12/11/2015, head CT, 3/25/2015 FINDINGS: VENTRICLES, SULCI: Moderate to marked enlargement ventricles and sulci consistent with volume loss INTRA, EXTRA-AXIAL: Nonspecific white matter decreased attenuation, likely sequela of microvascular disease with multiple areas of remote and age indeterminate lacunar infarcts. No hemorrhage or midline shift, basal cisterns are patent. Atherosclerotic vascular calcification carotid siphons Extra-axial: No extra-axial fluid collection is identified. ORBITAL CONTENTS: Bilateral orbital proptosis, correlate with thyroid function, orbital globes are intact, lenses are not dislocated, retrobulbar orbital fat appears preserved MASTOIDS, INTERNAL AUDITORY CANALS: Soft tissue swelling, loss  subcutaneous soft tissue planes  left ear involving left pinna, estelle, pre and posterior auricular tissues, through left fissures of Santorini to  the left parotid space, and left TMJ with slight loss left retrocondylar fat pad (5:13). There is inflammatory subcutaneous soft tissue swelling extends from left parotid space toward the left suboccipital,  and parapharyngeal spaces (5:9), correlate for necrotizing otitis externa with known diabetes. Soft tissue swelling extends left external auditory canal, to the left suboccipital soft tissues, (5:15), narrowing the external auditory canal. The left bony external auditory canal is patent. Left tympanic membrane, scutum, ossicular chain, appear intact. Course of the left and right facial nerve is unremarkable without bony dehiscence, bilateral middle ears are clear, bilateral tegmen tympani and tegmen mastoideum are intact. The bilateral vestibular and cochlear aqueducts, vestibule, and cochleas are unremarkable. There is sclerosis and opacification along the bilateral mastoid tips. There is slight asymmetric soft tissue swelling left torus tubarius with effacement left fossa of Rosenmuller (6:40). SINONASAL CAVITIES: Bilateral ostiomeatal complexes, frontal and sphenoethmoidal recesses are patent, with mucosal thickening in the maxillary and ethmoid sinuses, and minimal mucosal thickening in the frontal and sphenoid sinuses, there is rightward superior and inferior leftward nasal septal deviation with a left-sided osseous spur abutting left inferior turbinate restricting airflow in the left inferior meatus. There is dental disease with lucency along teeth maxillary alveolus extending to the maxillary sinuses correlate dental inspection with dental amalgam and implants causing streak artifact limiting assessment. CALVARIUM:, Intact IMPRESSION: Soft tissue swelling, with loss of fat planes planes  left ear, pinna, estelle, pre and posterior auricular tissues, left parotid space, left TMJ, with loss left retrocondylar fat pad, with extension toward the left parotid space, left suboccipital,  and parapharyngeal spaces (5:9), involving left external auditory canal correlate for necrotizing otitis externa with known diabetes. Mild volume loss, microvascular disease, no  hemorrhage or midline shift, orbital proptosis, correlate thyroid function. If symptoms persist consider follow-up head CT or MR if no contraindication. Sclerosis and opacification of bilateral mastoid tips, soft tissue fat swelling, effaced left fossa of Rosenmuller (5:12), maxillary mucosal thickening retention cysts polyps, dental disease. --- End of Report --- MERARI MILLER MD; Attending Radiologist This document has been electronically signed. Dec 17 2021  8:05PM HPI:  70 y/o m hx HTN, HLD, chf, copd, DM not on hyperglycemics here now with L ear pain and swelling x1 month. First started in November and was treated with bactrim. Now also with bilateral red eyes and pain with movement, dizziness, worse with movement though sometimes at rest. No recorded fevers, but does feel diffusely weak. No history of ear infections or ear surgery. Denies changes to facial movement. Complaining of decreased hearing R>L. Denies otorrhea.    Allergies    penicillin (Rash)    Intolerances        PAST MEDICAL & SURGICAL HISTORY:  CHF (Congestive Heart Failure)    COPD (Chronic Obstructive Pulmonary Disease)    Dyslipidemia    HTN (Hypertension)    S/P Cardiac Catheterization  2008-[facility] no stents    Asthma    Colitis    Hemorrhoid    Atrial fibrillation    Diabetes mellitus  type 2    History of Total Knee Replacement  bilateral    History of Appendectomy    S/P tonsillectomy      Vital Signs Last 24 Hrs  T(C): 36.7 (17 Dec 2021 19:07), Max: 37.2 (17 Dec 2021 14:52)  T(F): 98.1 (17 Dec 2021 19:07), Max: 99 (17 Dec 2021 14:52)  HR: 78 (17 Dec 2021 19:07) (62 - 78)  BP: 148/91 (17 Dec 2021 19:07) (113/76 - 161/89)  BP(mean): --  RR: 16 (17 Dec 2021 19:07) (16 - 16)  SpO2: 99% (17 Dec 2021 19:07) (97% - 99%)    LABS:  CBC-                        10.3   4.74  )-----------( 544      ( 17 Dec 2021 17:50 )             36.6     12-17    135  |  98  |  10  ----------------------------<  91  4.3   |  27  |  1.12    Ca    9.0      17 Dec 2021 17:50    TPro  8.6<H>  /  Alb  3.2<L>  /  TBili  0.2  /  DBili  x   /  AST  21  /  ALT  15  /  AlkPhos  119  12-17    Coagulation Studies-    Endocrine Panel-  --  --  9.0 mg/dL        PHYSICAL EXAM:    ENT EXAM-   Constitutional: Well-developed, well-nourished.  No hoarseness.     Head:  normocephalic, atraumatic.   Eyes: conjunctiva injected bilatearally  Ears: AD: canal normal, minimal cerumen, TM intact without effusion. AS: pinna erythematous with edematous cartilage, EAC patent, cerumen removed revealing intact TM with no effusion. No granulation tissue in EAC. No mastoid tenderness bilaterally.  Nose:  No external deformity  OC/OP:  Floor of mouth, buccal mucosa, lips, hard palate, soft palate, uvula, posterior pharyngeal wall normal.  Mucosa moist.  Neck:  Trachea midline.  Thyroid, parotid and submandibular glands normal.  Lymph:  No cervical adenopathy.    MULTISYSTEM EXAM-  Neuro/Psych:  A&O x 3.    Cranial nerves: 2-12 grossly intact bilaterally.  Pulm:  No dyspnea, non-labored breathing  Cardiovascular:  No periphreal edema.  Skin:  No rash or lesions on exposed skin of head/neck    RADIOLOGY & ADDITIONAL STUDIES:    ACC: [id number] EXAM:  CT ORBITS IC ACC: [id number] EXAM:  CT IAC ACC: [id number] EXAM:  CT BRAIN PROCEDURE DATE:  12/17/2021 INTERPRETATION:  CT BRAIN, ORBITS AND INTERNAL AUDITORY CANALS WITH AND WITHOUT CONTRAST INDICATIONS:  70 y/o m   HTN, HLD, chf, copd, per chart DM  patient not on hyperglycemics?  L ear pain and swelling x1 week,  second episode of the same,  first occurred in november and was treated with bactrim. now also with bilateral red eyes and pain with movement, dizziness, worse with movement though sometimes at rest. no recorded fevers, but does feel diffusely weak TECHNIQUE:  Serial axial images were obtained from the skull base to the vertex without the use of contrast. In addition contiguous serial axial images were obtained through the orbits and internal auditory canals after intravenous administration of 90 cc Omnipaque 350, with 10 cc discarded and coronal reformatted images through the orbits and internal auditory canals were performed. Dedicated 3-D images were made using dedicated 3D software and viewed on a dedicated 3D workstation in multiple planes. COMPARISON EXAM: White blood cell scan, 12/11/2015, head CT, 3/25/2015 FINDINGS: VENTRICLES, SULCI: Moderate to marked enlargement ventricles and sulci consistent with volume loss INTRA, EXTRA-AXIAL: Nonspecific white matter decreased attenuation, likely sequela of microvascular disease with multiple areas of remote and age indeterminate lacunar infarcts. No hemorrhage or midline shift, basal cisterns are patent. Atherosclerotic vascular calcification carotid siphons Extra-axial: No extra-axial fluid collection is identified. ORBITAL CONTENTS: Bilateral orbital proptosis, correlate with thyroid function, orbital globes are intact, lenses are not dislocated, retrobulbar orbital fat appears preserved MASTOIDS, INTERNAL AUDITORY CANALS: Soft tissue swelling, loss  subcutaneous soft tissue planes  left ear involving left pinna, estelle, pre and posterior auricular tissues, through left fissures of Santorini to  the left parotid space, and left TMJ with slight loss left retrocondylar fat pad (5:13). There is inflammatory subcutaneous soft tissue swelling extends from left parotid space toward the left suboccipital,  and parapharyngeal spaces (5:9), correlate for necrotizing otitis externa with known diabetes. Soft tissue swelling extends left external auditory canal, to the left suboccipital soft tissues, (5:15), narrowing the external auditory canal. The left bony external auditory canal is patent. Left tympanic membrane, scutum, ossicular chain, appear intact. Course of the left and right facial nerve is unremarkable without bony dehiscence, bilateral middle ears are clear, bilateral tegmen tympani and tegmen mastoideum are intact. The bilateral vestibular and cochlear aqueducts, vestibule, and cochleas are unremarkable. There is sclerosis and opacification along the bilateral mastoid tips. There is slight asymmetric soft tissue swelling left torus tubarius with effacement left fossa of Rosenmuller (6:40). SINONASAL CAVITIES: Bilateral ostiomeatal complexes, frontal and sphenoethmoidal recesses are patent, with mucosal thickening in the maxillary and ethmoid sinuses, and minimal mucosal thickening in the frontal and sphenoid sinuses, there is rightward superior and inferior leftward nasal septal deviation with a left-sided osseous spur abutting left inferior turbinate restricting airflow in the left inferior meatus. There is dental disease with lucency along teeth maxillary alveolus extending to the maxillary sinuses correlate dental inspection with dental amalgam and implants causing streak artifact limiting assessment. CALVARIUM:, Intact IMPRESSION: Soft tissue swelling, with loss of fat planes planes  left ear, pinna, estelle, pre and posterior auricular tissues, left parotid space, left TMJ, with loss left retrocondylar fat pad, with extension toward the left parotid space, left suboccipital,  and parapharyngeal spaces (5:9), involving left external auditory canal correlate for necrotizing otitis externa with known diabetes. Mild volume loss, microvascular disease, no  hemorrhage or midline shift, orbital proptosis, correlate thyroid function. If symptoms persist consider follow-up head CT or MR if no contraindication. Sclerosis and opacification of bilateral mastoid tips, soft tissue fat swelling, effaced left fossa of Rosenmuller (5:12), maxillary mucosal thickening retention cysts polyps, dental disease. --- End of Report --- MERARI MILLER MD; Attending Radiologist This document has been electronically signed. Dec 17 2021  8:05PM

## 2021-12-17 NOTE — ED PROVIDER NOTE - PHYSICAL EXAMINATION
Vitals: I have reviewed the patients vital signs  General: nontoxic appearing  HEENT: Atraumatic, normocephalic, L auricular perichondritis, tender pinna, no mastoid tenderness, TM difficult to visualize but no obvious serous otitis.   Eyes: bilat conj injection, photophobia, 20/50 bilaterally, pain with EOM.   Neck: no tracheal deviation, no JVD  Chest/Lungs: no trauma, symmetric chest rise, speaking in complete sentences, no WOB  Heart: skin and extremities well perfused, regular rate and rhythm  Neuro: A+Ox3, ambulation deferred, CN II-XII intact, intermittent reproducibility of symptoms with head and eye movement, no nystagmus  MSK: globally weak  Skin: no cyanosis, no jaundice, no new emergent lesions

## 2021-12-17 NOTE — ED ADULT TRIAGE NOTE - CHIEF COMPLAINT QUOTE
Pt presents to ED via EMS form home with c/o L ear infection x 1 month. Pt reports increasing dizziness, and balance issues. Pt denies fall or trauma. Olya Cerrato (wife) [phone]

## 2021-12-17 NOTE — CONSULT NOTE ADULT - ASSESSMENT
71M HTN, DM, chf, copd, presents with 1 month of L ear pain failing bactrim, now with dizziness/weakness and conjunctival injection and CT scan suggesting possible malignant OE. Physical exam consistent with chondritis.    No acute ENT intervention  Recommend viral panel given WBC wnl  Ciprofloxacin for chondritis coverage  Ciprodex gtt    ***INCOMPLETE NOTE*** 71M HTN, DM, chf, copd, presents with 1 month of L ear pain failing bactrim, now with dizziness/weakness and conjunctival injection and CT scan suggesting possible malignant OE. Physical exam consistent with chondritis.    No acute ENT intervention  Recommend viral panel given WBC wnl  Ciprofloxacin for chondritis to cover for pseudomonas  Remaining dispo per ED

## 2021-12-17 NOTE — ED PROVIDER NOTE - PROGRESS NOTE DETAILS
Huy: ENT paged will see pt Huy: ENT paged will see pt. Tx of otitis externa but pt. still c/o weakness and dizziness, unable to ambulate in ED.

## 2021-12-17 NOTE — ED PROVIDER NOTE - CLINICAL SUMMARY MEDICAL DECISION MAKING FREE TEXT BOX
Pt with UC, COPD, CHF, questionable hx of DM and recurrence of L auricular perichondritis, dizziness, weakness, eye redness and pain. TM difficult to visualize but no mastoid tenderness, pain with EOM, no nystagmus, difficult to reproduce vertigo at times, globally weak. bilat conj injection. concern for cranial extension of ear infection given hx will get CT IAC, CTH, CT orbit for orbital cellulitis as pt with pain with EOM. clinda for perichondritis, will likely need ENT consultation after imaging.

## 2021-12-17 NOTE — ED ADULT NURSE NOTE - OBJECTIVE STATEMENT
pt alert,oriented x3. reports continued pain to l ear x past month,redness to both eyes,dizziness,feeling of balance off with pressure to head. md to [facility]. iv access,labs sent. will continue to monitor

## 2021-12-17 NOTE — ED ADULT NURSE NOTE - NSIMPLEMENTINTERV_GEN_ALL_ED
Implemented All Fall with Harm Risk Interventions:  [place] to call system. Call bell, personal items and telephone within reach. Instruct patient to call for assistance. Room bathroom lighting operational. Non-slip footwear when patient is off stretcher. Physically safe environment: no spills, clutter or unnecessary equipment. Stretcher in lowest position, wheels locked, appropriate side rails in place. Provide visual cue, wrist band, yellow gown, etc. Monitor gait and stability. Monitor for mental status changes and reorient to person, place, and time. Review medications for side effects contributing to fall risk. Reinforce activity limits and safety measures with patient and family. Provide visual clues: red socks.

## 2021-12-17 NOTE — ED PROVIDER NOTE - NSICDXPASTSURGICALHX_GEN_ALL_CORE_FT
PAST SURGICAL HISTORY:  History of Appendectomy     History of Total Knee Replacement bilateral    S/P tonsillectomy

## 2021-12-17 NOTE — ED ADULT NURSE NOTE - CHIEF COMPLAINT QUOTE
Pt presents to ED via EMS form home with c/o L ear infection x 1 month. Pt reports increasing dizziness, and balance issues. Pt denies fall or trauma. Olya Cerrato (wife) [phone]

## 2021-12-17 NOTE — ED PROVIDER NOTE - NSICDXPASTMEDICALHX_GEN_ALL_CORE_FT
PAST MEDICAL HISTORY:  Asthma     Atrial fibrillation     CHF (Congestive Heart Failure)     Colitis     COPD (Chronic Obstructive Pulmonary Disease)     Diabetes mellitus type 2    Dyslipidemia     Hemorrhoid     HTN (Hypertension)     S/P Cardiac Catheterization 2008-[facility] no stents

## 2021-12-17 NOTE — ED ADULT NURSE REASSESSMENT NOTE - NS ED NURSE REASSESS COMMENT FT1
reports decreased dizziness,decreased pressure to head. remains alert,oriented x3. sleeping intermittently

## 2021-12-17 NOTE — ED PROVIDER NOTE - NS ED ROS FT
Constitutional: (-) fever (-) vomiting  Eyes/ENT: (-) vision changes, (-) hearing changes  Cardiovascular: (-) chest pain, (-) wheezing  Respiratory: (-) cough, (-) shortness of breath  Gastrointestinal: (-) vomiting, (-) diarrhea, (-) abdominal pain  : (-) dysuria   Musculoskeletal: (-) back pain  Integumentary: (-) rash, (-) edema  Neurological: +dizziness  Allergic/Immunologic: (-) pruritus  Endocrine: No history of thyroid disease

## 2021-12-18 ENCOUNTER — TRANSCRIPTION ENCOUNTER (OUTPATIENT)
Age: 71
End: 2021-12-18

## 2021-12-18 DIAGNOSIS — R42 DIZZINESS AND GIDDINESS: ICD-10-CM

## 2021-12-18 DIAGNOSIS — H05.20 UNSPECIFIED EXOPHTHALMOS: ICD-10-CM

## 2021-12-18 DIAGNOSIS — H60.90 UNSPECIFIED OTITIS EXTERNA, UNSPECIFIED EAR: ICD-10-CM

## 2021-12-18 DIAGNOSIS — I10 ESSENTIAL (PRIMARY) HYPERTENSION: ICD-10-CM

## 2021-12-18 DIAGNOSIS — E78.5 HYPERLIPIDEMIA, UNSPECIFIED: ICD-10-CM

## 2021-12-18 DIAGNOSIS — D75.839 THROMBOCYTOSIS, UNSPECIFIED: ICD-10-CM

## 2021-12-18 DIAGNOSIS — H10.9 UNSPECIFIED CONJUNCTIVITIS: ICD-10-CM

## 2021-12-18 DIAGNOSIS — E11.9 TYPE 2 DIABETES MELLITUS WITHOUT COMPLICATIONS: ICD-10-CM

## 2021-12-18 DIAGNOSIS — M94.8X9 OTHER SPECIFIED DISORDERS OF CARTILAGE, UNSPECIFIED SITES: ICD-10-CM

## 2021-12-18 DIAGNOSIS — J45.909 UNSPECIFIED ASTHMA, UNCOMPLICATED: ICD-10-CM

## 2021-12-18 DIAGNOSIS — K51.90 ULCERATIVE COLITIS, UNSPECIFIED, WITHOUT COMPLICATIONS: ICD-10-CM

## 2021-12-18 DIAGNOSIS — Z29.9 ENCOUNTER FOR PROPHYLACTIC MEASURES, UNSPECIFIED: ICD-10-CM

## 2021-12-18 LAB
A1C WITH ESTIMATED AVERAGE GLUCOSE RESULT: 5.7 % — HIGH (ref 4–5.6)
ALBUMIN SERPL ELPH-MCNC: 2.5 G/DL — LOW (ref 3.3–5)
ALP SERPL-CCNC: 108 U/L — SIGNIFICANT CHANGE UP (ref 40–120)
ALT FLD-CCNC: 14 U/L — SIGNIFICANT CHANGE UP (ref 4–41)
ANION GAP SERPL CALC-SCNC: 7 MMOL/L — SIGNIFICANT CHANGE UP (ref 7–14)
AST SERPL-CCNC: 18 U/L — SIGNIFICANT CHANGE UP (ref 4–40)
B PERT DNA SPEC QL NAA+PROBE: SIGNIFICANT CHANGE UP
B PERT+PARAPERT DNA PNL SPEC NAA+PROBE: SIGNIFICANT CHANGE UP
BASOPHILS # BLD AUTO: 0.01 K/UL — SIGNIFICANT CHANGE UP (ref 0–0.2)
BASOPHILS NFR BLD AUTO: 0.2 % — SIGNIFICANT CHANGE UP (ref 0–2)
BILIRUB SERPL-MCNC: 0.3 MG/DL — SIGNIFICANT CHANGE UP (ref 0.2–1.2)
BORDETELLA PARAPERTUSSIS (RAPRVP): SIGNIFICANT CHANGE UP
BUN SERPL-MCNC: 8 MG/DL — SIGNIFICANT CHANGE UP (ref 7–23)
C PNEUM DNA SPEC QL NAA+PROBE: SIGNIFICANT CHANGE UP
CALCIUM SERPL-MCNC: 8.6 MG/DL — SIGNIFICANT CHANGE UP (ref 8.4–10.5)
CHLORIDE SERPL-SCNC: 103 MMOL/L — SIGNIFICANT CHANGE UP (ref 98–107)
CHOLEST SERPL-MCNC: 148 MG/DL — SIGNIFICANT CHANGE UP
CO2 SERPL-SCNC: 22 MMOL/L — SIGNIFICANT CHANGE UP (ref 22–31)
CREAT SERPL-MCNC: 0.93 MG/DL — SIGNIFICANT CHANGE UP (ref 0.5–1.3)
EOSINOPHIL # BLD AUTO: 0.15 K/UL — SIGNIFICANT CHANGE UP (ref 0–0.5)
EOSINOPHIL NFR BLD AUTO: 3.1 % — SIGNIFICANT CHANGE UP (ref 0–6)
ESTIMATED AVERAGE GLUCOSE: 117 — SIGNIFICANT CHANGE UP
FLUAV SUBTYP SPEC NAA+PROBE: SIGNIFICANT CHANGE UP
FLUBV RNA SPEC QL NAA+PROBE: SIGNIFICANT CHANGE UP
FOLATE SERPL-MCNC: 20 NG/ML — HIGH (ref 3.1–17.5)
GLUCOSE BLDC GLUCOMTR-MCNC: 109 MG/DL — HIGH (ref 70–99)
GLUCOSE BLDC GLUCOMTR-MCNC: 116 MG/DL — HIGH (ref 70–99)
GLUCOSE BLDC GLUCOMTR-MCNC: 176 MG/DL — HIGH (ref 70–99)
GLUCOSE BLDC GLUCOMTR-MCNC: 86 MG/DL — SIGNIFICANT CHANGE UP (ref 70–99)
GLUCOSE SERPL-MCNC: 78 MG/DL — SIGNIFICANT CHANGE UP (ref 70–99)
HADV DNA SPEC QL NAA+PROBE: SIGNIFICANT CHANGE UP
HCOV 229E RNA SPEC QL NAA+PROBE: SIGNIFICANT CHANGE UP
HCOV HKU1 RNA SPEC QL NAA+PROBE: SIGNIFICANT CHANGE UP
HCOV NL63 RNA SPEC QL NAA+PROBE: SIGNIFICANT CHANGE UP
HCOV OC43 RNA SPEC QL NAA+PROBE: SIGNIFICANT CHANGE UP
HCT VFR BLD CALC: 35.1 % — LOW (ref 39–50)
HCV AB S/CO SERPL IA: 0.32 S/CO — SIGNIFICANT CHANGE UP (ref 0–0.99)
HCV AB SERPL-IMP: SIGNIFICANT CHANGE UP
HDLC SERPL-MCNC: 37 MG/DL — LOW
HGB BLD-MCNC: 10.3 G/DL — LOW (ref 13–17)
HMPV RNA SPEC QL NAA+PROBE: SIGNIFICANT CHANGE UP
HPIV1 RNA SPEC QL NAA+PROBE: SIGNIFICANT CHANGE UP
HPIV2 RNA SPEC QL NAA+PROBE: SIGNIFICANT CHANGE UP
HPIV3 RNA SPEC QL NAA+PROBE: SIGNIFICANT CHANGE UP
HPIV4 RNA SPEC QL NAA+PROBE: SIGNIFICANT CHANGE UP
IANC: 3.17 K/UL — SIGNIFICANT CHANGE UP (ref 1.5–8.5)
IMM GRANULOCYTES NFR BLD AUTO: 0.6 % — SIGNIFICANT CHANGE UP (ref 0–1.5)
LIPID PNL WITH DIRECT LDL SERPL: 100 MG/DL — HIGH
LYMPHOCYTES # BLD AUTO: 0.79 K/UL — LOW (ref 1–3.3)
LYMPHOCYTES # BLD AUTO: 16.5 % — SIGNIFICANT CHANGE UP (ref 13–44)
M PNEUMO DNA SPEC QL NAA+PROBE: SIGNIFICANT CHANGE UP
MAGNESIUM SERPL-MCNC: 1.8 MG/DL — SIGNIFICANT CHANGE UP (ref 1.6–2.6)
MCHC RBC-ENTMCNC: 20.5 PG — LOW (ref 27–34)
MCHC RBC-ENTMCNC: 29.3 GM/DL — LOW (ref 32–36)
MCV RBC AUTO: 69.8 FL — LOW (ref 80–100)
MONOCYTES # BLD AUTO: 0.65 K/UL — SIGNIFICANT CHANGE UP (ref 0–0.9)
MONOCYTES NFR BLD AUTO: 13.5 % — SIGNIFICANT CHANGE UP (ref 2–14)
NEUTROPHILS # BLD AUTO: 3.17 K/UL — SIGNIFICANT CHANGE UP (ref 1.8–7.4)
NEUTROPHILS NFR BLD AUTO: 66.1 % — SIGNIFICANT CHANGE UP (ref 43–77)
NON HDL CHOLESTEROL: 111 MG/DL — SIGNIFICANT CHANGE UP
NRBC # BLD: 0 /100 WBCS — SIGNIFICANT CHANGE UP
NRBC # FLD: 0 K/UL — SIGNIFICANT CHANGE UP
PHOSPHATE SERPL-MCNC: 2.7 MG/DL — SIGNIFICANT CHANGE UP (ref 2.5–4.5)
PLATELET # BLD AUTO: 565 K/UL — HIGH (ref 150–400)
POTASSIUM SERPL-MCNC: 3.9 MMOL/L — SIGNIFICANT CHANGE UP (ref 3.5–5.3)
POTASSIUM SERPL-SCNC: 3.9 MMOL/L — SIGNIFICANT CHANGE UP (ref 3.5–5.3)
PROT SERPL-MCNC: 7.8 G/DL — SIGNIFICANT CHANGE UP (ref 6–8.3)
RAPID RVP RESULT: SIGNIFICANT CHANGE UP
RBC # BLD: 5.03 M/UL — SIGNIFICANT CHANGE UP (ref 4.2–5.8)
RBC # FLD: 20 % — HIGH (ref 10.3–14.5)
RSV RNA SPEC QL NAA+PROBE: SIGNIFICANT CHANGE UP
RV+EV RNA SPEC QL NAA+PROBE: SIGNIFICANT CHANGE UP
SARS-COV-2 RNA SPEC QL NAA+PROBE: SIGNIFICANT CHANGE UP
SODIUM SERPL-SCNC: 132 MMOL/L — LOW (ref 135–145)
TRIGL SERPL-MCNC: 56 MG/DL — SIGNIFICANT CHANGE UP
TSH SERPL-MCNC: 2.01 UIU/ML — SIGNIFICANT CHANGE UP (ref 0.27–4.2)
VIT B12 SERPL-MCNC: 608 PG/ML — SIGNIFICANT CHANGE UP (ref 200–900)
WBC # BLD: 4.8 K/UL — SIGNIFICANT CHANGE UP (ref 3.8–10.5)
WBC # FLD AUTO: 4.8 K/UL — SIGNIFICANT CHANGE UP (ref 3.8–10.5)

## 2021-12-18 PROCEDURE — 99223 1ST HOSP IP/OBS HIGH 75: CPT | Mod: GC

## 2021-12-18 PROCEDURE — 93010 ELECTROCARDIOGRAM REPORT: CPT

## 2021-12-18 PROCEDURE — 12345: CPT | Mod: NC,GC

## 2021-12-18 RX ORDER — ENOXAPARIN SODIUM 100 MG/ML
40 INJECTION SUBCUTANEOUS DAILY
Refills: 0 | Status: DISCONTINUED | OUTPATIENT
Start: 2021-12-18 | End: 2021-12-31

## 2021-12-18 RX ORDER — ERYTHROMYCIN BASE 5 MG/GRAM
1 OINTMENT (GRAM) OPHTHALMIC (EYE) DAILY
Refills: 0 | Status: DISCONTINUED | OUTPATIENT
Start: 2021-12-18 | End: 2021-12-20

## 2021-12-18 RX ORDER — CARVEDILOL PHOSPHATE 80 MG/1
12.5 CAPSULE, EXTENDED RELEASE ORAL EVERY 12 HOURS
Refills: 0 | Status: DISCONTINUED | OUTPATIENT
Start: 2021-12-18 | End: 2021-12-31

## 2021-12-18 RX ORDER — MECLIZINE HCL 12.5 MG
25 TABLET ORAL ONCE
Refills: 0 | Status: COMPLETED | OUTPATIENT
Start: 2021-12-18 | End: 2021-12-18

## 2021-12-18 RX ORDER — DEXTROSE 50 % IN WATER 50 %
15 SYRINGE (ML) INTRAVENOUS ONCE
Refills: 0 | Status: DISCONTINUED | OUTPATIENT
Start: 2021-12-18 | End: 2021-12-23

## 2021-12-18 RX ORDER — DEXTROSE 50 % IN WATER 50 %
25 SYRINGE (ML) INTRAVENOUS ONCE
Refills: 0 | Status: DISCONTINUED | OUTPATIENT
Start: 2021-12-18 | End: 2021-12-23

## 2021-12-18 RX ORDER — DIPHENOXYLATE HCL/ATROPINE 2.5-.025MG
1 TABLET ORAL
Qty: 0 | Refills: 0 | DISCHARGE

## 2021-12-18 RX ORDER — DIPHENOXYLATE HCL/ATROPINE 2.5-.025MG
1 TABLET ORAL
Refills: 0 | Status: DISCONTINUED | OUTPATIENT
Start: 2021-12-18 | End: 2021-12-23

## 2021-12-18 RX ORDER — LATANOPROST 0.05 MG/ML
1 SOLUTION/ DROPS OPHTHALMIC; TOPICAL AT BEDTIME
Refills: 0 | Status: DISCONTINUED | OUTPATIENT
Start: 2021-12-18 | End: 2021-12-31

## 2021-12-18 RX ORDER — LOSARTAN POTASSIUM 100 MG/1
100 TABLET, FILM COATED ORAL DAILY
Refills: 0 | Status: DISCONTINUED | OUTPATIENT
Start: 2021-12-18 | End: 2021-12-31

## 2021-12-18 RX ORDER — LATANOPROST 0.05 MG/ML
1 SOLUTION/ DROPS OPHTHALMIC; TOPICAL
Qty: 0 | Refills: 0 | DISCHARGE

## 2021-12-18 RX ORDER — INSULIN LISPRO 100/ML
VIAL (ML) SUBCUTANEOUS
Refills: 0 | Status: DISCONTINUED | OUTPATIENT
Start: 2021-12-18 | End: 2021-12-23

## 2021-12-18 RX ORDER — ATORVASTATIN CALCIUM 80 MG/1
40 TABLET, FILM COATED ORAL AT BEDTIME
Refills: 0 | Status: DISCONTINUED | OUTPATIENT
Start: 2021-12-18 | End: 2021-12-31

## 2021-12-18 RX ORDER — DIPHENOXYLATE HCL/ATROPINE 2.5-.025MG
2 TABLET ORAL
Qty: 0 | Refills: 0 | DISCHARGE

## 2021-12-18 RX ORDER — ASPIRIN/CALCIUM CARB/MAGNESIUM 324 MG
81 TABLET ORAL DAILY
Refills: 0 | Status: DISCONTINUED | OUTPATIENT
Start: 2021-12-18 | End: 2021-12-27

## 2021-12-18 RX ORDER — OFLOXACIN 0.3 %
1 DROPS OPHTHALMIC (EYE)
Refills: 0 | Status: DISCONTINUED | OUTPATIENT
Start: 2021-12-18 | End: 2021-12-20

## 2021-12-18 RX ORDER — GLUCAGON INJECTION, SOLUTION 0.5 MG/.1ML
1 INJECTION, SOLUTION SUBCUTANEOUS ONCE
Refills: 0 | Status: DISCONTINUED | OUTPATIENT
Start: 2021-12-18 | End: 2021-12-23

## 2021-12-18 RX ORDER — BUDESONIDE AND FORMOTEROL FUMARATE DIHYDRATE 160; 4.5 UG/1; UG/1
2 AEROSOL RESPIRATORY (INHALATION)
Refills: 0 | Status: DISCONTINUED | OUTPATIENT
Start: 2021-12-18 | End: 2021-12-18

## 2021-12-18 RX ORDER — ASPIRIN/CALCIUM CARB/MAGNESIUM 324 MG
81 TABLET ORAL DAILY
Refills: 0 | Status: DISCONTINUED | OUTPATIENT
Start: 2021-12-18 | End: 2021-12-18

## 2021-12-18 RX ORDER — LOSARTAN POTASSIUM 100 MG/1
1 TABLET, FILM COATED ORAL
Qty: 0 | Refills: 0 | DISCHARGE

## 2021-12-18 RX ORDER — SODIUM CHLORIDE 9 MG/ML
1000 INJECTION, SOLUTION INTRAVENOUS
Refills: 0 | Status: DISCONTINUED | OUTPATIENT
Start: 2021-12-18 | End: 2021-12-23

## 2021-12-18 RX ORDER — ACETAMINOPHEN 500 MG
650 TABLET ORAL EVERY 6 HOURS
Refills: 0 | Status: DISCONTINUED | OUTPATIENT
Start: 2021-12-18 | End: 2021-12-31

## 2021-12-18 RX ORDER — MONTELUKAST 4 MG/1
10 TABLET, CHEWABLE ORAL AT BEDTIME
Refills: 0 | Status: DISCONTINUED | OUTPATIENT
Start: 2021-12-18 | End: 2021-12-31

## 2021-12-18 RX ORDER — ALBUTEROL 90 UG/1
2 AEROSOL, METERED ORAL EVERY 6 HOURS
Refills: 0 | Status: DISCONTINUED | OUTPATIENT
Start: 2021-12-18 | End: 2021-12-31

## 2021-12-18 RX ORDER — CIPROFLOXACIN LACTATE 400MG/40ML
400 VIAL (ML) INTRAVENOUS EVERY 12 HOURS
Refills: 0 | Status: DISCONTINUED | OUTPATIENT
Start: 2021-12-18 | End: 2021-12-20

## 2021-12-18 RX ORDER — DEXTROSE 50 % IN WATER 50 %
12.5 SYRINGE (ML) INTRAVENOUS ONCE
Refills: 0 | Status: DISCONTINUED | OUTPATIENT
Start: 2021-12-18 | End: 2021-12-23

## 2021-12-18 RX ORDER — BUDESONIDE AND FORMOTEROL FUMARATE DIHYDRATE 160; 4.5 UG/1; UG/1
2 AEROSOL RESPIRATORY (INHALATION)
Refills: 0 | Status: DISCONTINUED | OUTPATIENT
Start: 2021-12-18 | End: 2021-12-31

## 2021-12-18 RX ADMIN — CARVEDILOL PHOSPHATE 12.5 MILLIGRAM(S): 80 CAPSULE, EXTENDED RELEASE ORAL at 18:14

## 2021-12-18 RX ADMIN — ATORVASTATIN CALCIUM 40 MILLIGRAM(S): 80 TABLET, FILM COATED ORAL at 21:45

## 2021-12-18 RX ADMIN — Medication 1 DROP(S): at 13:32

## 2021-12-18 RX ADMIN — LATANOPROST 1 DROP(S): 0.05 SOLUTION/ DROPS OPHTHALMIC; TOPICAL at 21:46

## 2021-12-18 RX ADMIN — Medication 1 TABLET(S): at 18:20

## 2021-12-18 RX ADMIN — Medication 1 APPLICATION(S): at 18:22

## 2021-12-18 RX ADMIN — BUDESONIDE AND FORMOTEROL FUMARATE DIHYDRATE 2 PUFF(S): 160; 4.5 AEROSOL RESPIRATORY (INHALATION) at 09:01

## 2021-12-18 RX ADMIN — Medication 200 MILLIGRAM(S): at 05:10

## 2021-12-18 RX ADMIN — Medication 650 MILLIGRAM(S): at 19:25

## 2021-12-18 RX ADMIN — BUDESONIDE AND FORMOTEROL FUMARATE DIHYDRATE 2 PUFF(S): 160; 4.5 AEROSOL RESPIRATORY (INHALATION) at 21:45

## 2021-12-18 RX ADMIN — ENOXAPARIN SODIUM 40 MILLIGRAM(S): 100 INJECTION SUBCUTANEOUS at 13:33

## 2021-12-18 RX ADMIN — Medication 1 DROP(S): at 18:26

## 2021-12-18 RX ADMIN — MONTELUKAST 10 MILLIGRAM(S): 4 TABLET, CHEWABLE ORAL at 21:45

## 2021-12-18 RX ADMIN — Medication 200 MILLIGRAM(S): at 18:14

## 2021-12-18 RX ADMIN — Medication 650 MILLIGRAM(S): at 18:25

## 2021-12-18 RX ADMIN — Medication 1 DROP(S): at 18:21

## 2021-12-18 RX ADMIN — Medication 1 TABLET(S): at 13:31

## 2021-12-18 RX ADMIN — Medication 81 MILLIGRAM(S): at 13:33

## 2021-12-18 RX ADMIN — Medication 25 MILLIGRAM(S): at 09:01

## 2021-12-18 NOTE — OCCUPATIONAL THERAPY INITIAL EVALUATION ADULT - DIAGNOSIS, OT EVAL
s/p dizziness/headache, s/p necrotizing otitis externa, s/p r/o CVA; decreased functional mobility, decreased ADL performance

## 2021-12-18 NOTE — DISCHARGE NOTE PROVIDER - CARE PROVIDERS DIRECT ADDRESSES
,[email or web address]

## 2021-12-18 NOTE — DISCHARGE NOTE PROVIDER - NSDCCPTREATMENT_GEN_ALL_CORE_FT
PRINCIPAL PROCEDURE  Procedure: Barium esophagram  Findings and Treatment: You underwent a barium esophagram. A test that evaluates your esophagus. The results were abnormal. Please complete your course of antifungal medication and follow-up with the GI doctor upon discharge.

## 2021-12-18 NOTE — PROGRESS NOTE ADULT - PROBLEM SELECTOR PLAN 5
[facility]  - continue singular and inhalers

## 2021-12-18 NOTE — OCCUPATIONAL THERAPY INITIAL EVALUATION ADULT - TRANSFER SAFETY CONCERNS NOTED: SIT/STAND, REHAB EVAL
Patient had episode of loss of balance x 2 associated with dizziness needing assistance from therapist to maintain balance. Symptoms subsided when returned to sitting/losing balance

## 2021-12-18 NOTE — H&P ADULT - PROBLEM SELECTOR PLAN 2
- pt reports left ear pain and swelling x 1 month  - pt is s/p PO bactrim outpatient   - CT concerning for necrotizing otitis externa  - pt is s/p IV Cipro and IV clinda in ER  - ENT following  - Rec Ciprofloxacin for chondritis to cover for pseudomonas  - Will continue IV Cipro    - F.U MRI -otitis externa   - pt reports left ear pain and swelling x 1 month  - pt is s/p PO bactrim outpatient   - CT concerning for necrotizing otitis externa  - pt is s/p IV Cipro and IV clinda in ER  - ENT following  - Rec Ciprofloxacin for chondritis to cover for pseudomonas  - Will continue IV Cipro    - F/U MRI - pt reports left ear pain and swelling x 1 month  - pt is s/p PO bactrim outpatient   - CT concerning for necrotizing otitis externa  - pt is s/p IV Cipro and IV clinda in ER  - ENT following  - Rec Ciprofloxacin for chondritis to cover for pseudomonas  - Will continue IV Cipro    - F/U MRI

## 2021-12-18 NOTE — PROGRESS NOTE ADULT - PROBLEM SELECTOR PLAN 4
- hx of asthma  - continue singular and inhalers - pt with bilateral conjunctivitis   - no purulent eye discharge   - CT showing orbital proptosis - TSH wnl  - will continue latanoprost eye drops  - f/u optho - pt with bilateral conjunctivitis   - no purulent eye discharge   - CT showing orbital proptosis - TSH wnl  - will continue latanoprost eye drops  - erythromycin + Ofloxacin per Optho

## 2021-12-18 NOTE — H&P ADULT - PROBLEM SELECTOR PLAN 3
- CT showing orbital proptosis  - check TSH in AM - CT showing orbital proptosis  - conjunctivitis   - latanoprast drops  - optho c.s   - check TSH in AM - pt with bilateral conjunctivitis   - no purulent eye discharge   - CT showing orbital proptosis - check TSH in AM  - will continue latanoprost eye drops  - artificial tears   - optho c/s in AM - pt with bilateral conjunctivitis   - no purulent eye discharge   - CT showing orbital proptosis - check TSH in AM  - will continue latanoprost eye drops  - optho c/s in AM

## 2021-12-18 NOTE — PROGRESS NOTE ADULT - ATTENDING COMMENTS
72 yo M PMHx of COPD, Asthma, ?atrial fibrillation per chart (not on AC), Ulcerative Colitis, DM (no longer on insulin) and HTN presenting with complaint of left ear pain x 1 month presenting with inability to ambulate 2/2 dizziness described as room spinning sensation, 9/10 frontal headache, and bilateral eye erythema. CT IAC concerning for necrotizing otitis externa with an additional concern for possible CVA (given symptoms) and admitted for work up. Pt with severe tenderness to palpation of the ear lobe on exam. Failed Bactrim therapy outpatient. ENT following. On ciprofloxacin for pseudomonal coverage. Given, dizziness and gait disturbance neuro consult and will pursue stroke work up as detailed above. Optho consulted for bilateral conjunctival erythema, f/u recommendations.     Remainder of plan as stated above. Plan discussed with HS.

## 2021-12-18 NOTE — CONSULT NOTE ADULT - SUBJECTIVE AND OBJECTIVE BOX
[facility] DEPARTMENT OF OPHTHALMOLOGY - INITIAL ADULT CONSULT  -----------------------------------------------------------------------------------------------------------  Luli Flannery MD PGY-2  [phone]  Also available on Microsoft Teams  -----------------------------------------------------------------------------------------------------------    HPI:  70 yo M PMHx of COPD, Asthma, ?atrial fibrillation per chart (not on AC), Ulcerative Colitis, DM (no longer on insulin) and HTN presenting with complaint of ear pain, headache, and dizziness with nausea. Patient reports he had left ear pain x 1 month he was RX Bactrim for a 10 day course and had improvement in symptoms then on 12/10/21 patients ear pain returned. He was Rx another 10 day course of PO Bactrim however the next day patient had intermittent dizziness described as room spinning sensation and nausea. Wife at bedside (Mariela) reports at baseline patient ambulates without assistance however has been using cane due to symptoms. Patient reports he was unable to ambulate 2/2 dizziness. Denies syncope, LOC. Patient also reports bilateral eye erythema and pain with movement as well as intermittent 9/10 frontal headache described as stabbing x 6 days. He has been taking Tylenol for mild relief. Denies syncope, LOC, head trauma, fever, chills, vision change, chest pain, dyspnea, palpitations, abdominal pain, nausea, vomiting, melena, hematochezia, lower extremity swelling.       In ED vitals: Temp 98.1F HR 69 /88 Sp02 100% RA   Patient is s/p IV NS 1L X 1, IV Cipro 400mg x 1, IV Clinda 600mg x 1, and Ciprodex otic drops   CTH negative for acute pathology but shows multiple areas of remote and age-indeterminant lacunar infarcts. CT IAC concerning for necrotizing otitis externa.  Patient was seen and evaluated by ENT and Neurology  (18 Dec 2021 04:42)    Interval History: Ophthalmology consulted for eye redness of 4 days durations. No associated eye pain, discharge, tearing, diplopia, floaters, flashes, curtain falling, fevers, chills.   Pt sees Dr. Anil Crystal for GS/elevated IOP, reports last seen in 11/2021 with normal visual field, no eye redness then. on xal 1,1.     PAST MEDICAL & SURGICAL HISTORY:  CHF (Congestive Heart Failure)    COPD (Chronic Obstructive Pulmonary Disease)    Dyslipidemia    HTN (Hypertension)    S/P Cardiac Catheterization  2008-[facility] no stents    Asthma    Colitis    Hemorrhoid    Atrial fibrillation    Diabetes mellitus  type 2    History of Total Knee Replacement  bilateral    History of Appendectomy    S/P tonsillectomy      Past Ocular History: GS / ocular HTN  Ophthalmic Medications: xal 1,1  FAMILY HISTORY:  Family history of diabetes mellitus (Father)      MEDICATIONS  (STANDING):  artificial tears (preservative free) Ophthalmic Solution 1 Drop(s) Both EYES every 6 hours  aspirin enteric coated 81 milliGRAM(s) Oral daily  atorvastatin 40 milliGRAM(s) Oral at bedtime  budesonide 160 MICROgram(s)/formoterol 4.5 MICROgram(s) Inhaler 2 Puff(s) Inhalation two times a day  carvedilol 12.5 milliGRAM(s) Oral every 12 hours  ciprofloxacin   IVPB 400 milliGRAM(s) IV Intermittent every 12 hours  dextrose 40% Gel 15 Gram(s) Oral once  dextrose 5%. 1000 milliLiter(s) (50 mL/Hr) IV Continuous <Continuous>  dextrose 5%. 1000 milliLiter(s) (100 mL/Hr) IV Continuous <Continuous>  dextrose 50% Injectable 25 Gram(s) IV Push once  dextrose 50% Injectable 12.5 Gram(s) IV Push once  dextrose 50% Injectable 25 Gram(s) IV Push once  diphenoxylate/atropine 1 Tablet(s) Oral two times a day  enoxaparin Injectable 40 milliGRAM(s) SubCutaneous daily  erythromycin   Ointment 1 Application(s) Both EYES daily  glucagon  Injectable 1 milliGRAM(s) IntraMuscular once  insulin lispro (ADMELOG) corrective regimen sliding scale   SubCutaneous three times a day before meals  latanoprost 0.005% Ophthalmic Solution 1 Drop(s) Both EYES at bedtime  losartan 100 milliGRAM(s) Oral daily  montelukast 10 milliGRAM(s) Oral at bedtime  multivitamin 1 Tablet(s) Oral daily  ofloxacin 0.3% Solution 1 Drop(s) Both EYES four times a day    MEDICATIONS  (PRN):  acetaminophen     Tablet .. 650 milliGRAM(s) Oral every 6 hours PRN Mild Pain (1 - 3), Moderate Pain (4 - 6)  ALBUTerol    90 MICROgram(s) HFA Inhaler 2 Puff(s) Inhalation every 6 hours PRN Shortness of Breath and/or Wheezing    Allergies & Intolerances:   penicillin (Rash)    VITALS: T(C): 36.8 (12-18-21 @ 12:15)  T(F): 98.2 (12-18-21 @ 12:15), Max: 99 (12-17-21 @ 14:52)  HR: 108 (12-18-21 @ 12:15) (62 - 108)  BP: 142/89 (12-18-21 @ 12:15) (113/76 - 161/89)  RR:  (16 - 18)  SpO2:  (97% - 100%)  Wt(kg): --  General: AAO x 3, appropriate mood and affect    Ophthalmology Exam:  Visual acuity (cc): 20/20 OU  Pupils: PERRL OU, no APD  Ttono: 20, 19  Extraocular movements (EOMs): Full OU, no pain, no diplopia  Confrontational Visual Field (CVF): Full OU  Color Plates: 12/12 OU    Pen Light Exam (PLE)  External: Flat OU  Lids/Lashes/Lacrimal Ducts: ectropion OD>OS. Scant discharge medially OD.   Sclera/Conjunctiva: 2-3+ injection inferiorly>superiorly OU  Cornea: Cl OU  Anterior Chamber: D+F OU    Iris: Flat OU  Lens: NS OU    Fundus Exam: dilated with 1% tropicamide and 2.5% phenylephrine  Approval obtained from primary team for dilation  Patient aware that pupils can remained dilated for at least 4-6 hours  Exam performed with 20D lens    Vitreous: wnl OU  Disc, cup/disc: sharp and pink, 0.5 OU  Macula: wnl OU  Vessels: wnl OU  Periphery: RPE changes OU

## 2021-12-18 NOTE — OCCUPATIONAL THERAPY INITIAL EVALUATION ADULT - ADDITIONAL COMMENTS
Patient reports he has been only walking from his room to the bathroom for the last week due to onset of dizziness. His wife has been assisting with ADLs as needed.

## 2021-12-18 NOTE — H&P ADULT - NSHPLABSRESULTS_GEN_ALL_CORE
Labs:                        10.3   4.74  )-----------( 544      ( 17 Dec 2021 17:50 )             36.6     12-17    135  |  98  |  10  ----------------------------<  91  4.3   |  27  |  1.12    Ca    9.0      17 Dec 2021 17:50    TPro  8.6<H>  /  Alb  3.2<L>  /  TBili  0.2  /  DBili  x   /  AST  21  /  ALT  15  /  AlkPhos  119  12-17      CARDIAC ENZYMES:    CT Internal Auditory Canals No Cont (12.17.21 @ 19:30) >      IMPRESSION:    Soft tissue swelling, with loss of fat planes planes  left ear, pinna,   estelle, pre and posterior auriculartissues, left parotid space, left   TMJ, with loss left retrocondylar fat pad, with extension toward the left   parotid space, left suboccipital,  and parapharyngeal spaces   (5:9), involving left external auditory canal correlate for necrotizing   otitis externa with known diabetes.    Mild volume loss, microvascular disease, no  hemorrhage or midline shift,    orbital proptosis, correlate thyroid function. If symptoms persist   consider follow-up head CT or MR if no contraindication.    Sclerosis and opacification of bilateral mastoid tips, soft tissue fat   swelling, effaced left fossa of Rosenmuller (5:12), maxillary mucosal   thickening retention cysts polyps, dental disease.        Urinanalysis Basic (12-18-21 @ 04:49):    CAPILLARY BLOOD GLUCOSE:  POCT Blood Glucose: 114 mg/dL (12-17-21 @ 14:56)      COVID-19/Full RVP Panel:  NotDetec (12-17-21 @ 17:51)

## 2021-12-18 NOTE — PATIENT PROFILE ADULT - FALL HARM RISK - HARM RISK INTERVENTIONS

## 2021-12-18 NOTE — PHYSICAL THERAPY INITIAL EVALUATION ADULT - GAIT DISTANCE, PT EVAL
- During ambulation, Pt. presenting with two bouts of dizziness and loss of balance requiring assistance from therapist to maintain an upright erect posture. symptoms subsided after rest break/25 feet

## 2021-12-18 NOTE — H&P ADULT - ASSESSMENT
72 yo M PMHx of COPD, Asthma, ?atrial fibrillation per chart (not on AC), Ulcerative Colitis, DM (no longer on insulin) and HTN presenting with complaint of left ear pain x 1 month presenting with inability to ambulate 2/2 dizziness described as room spinning sensation, 9/10 frontal headache, and bilateral eye erythema. CT IAC concerning for necrotizing otitis externa

## 2021-12-18 NOTE — H&P ADULT - ATTENDING COMMENTS
This is a 70 y/o M with pmhx of HTN, HLD, DM type 2, Ulcerative colitis, afib? presented to the ED for ear pain. The patient has L sided ear pain for 1 month, was given Bactrim outpatient which improved, however on 12/10 the ear pain and erythema returned. His outpatient doctor gave Bactrim again. 2 days ago, he then developed red eyes b/l. Then he also developed worsening vertigo and ataxia 2 days ago, so he presented to the ED for further evaluation. The patient denies blurry vision or changes in vision. He endorsed mild pain when looking around. + lacrimation however no purulence. Denies L ear drainage.    Physical exam shows an elderly male, NAD, comfortable at bedside, eye evaluation EOMI, + conjunctival injection with lacrimation, mild tenderness on palpation, PERLLA, L ear tenderness and erythema on the out ear, R ear unremarkable, Lungs CTA b/l no crackles or wheezing, cardiac s1s2 RRR no murmurs, abdomen soft nontender to palpation nondistended, no LE edema b/l    Labs show mild anemia with hb of 10.3, BMP wnl, ESR 72 and CRP 54.8 both elevated    CT of orbits show concerns of necrotizing otitis externa on L ear    The patient is admitted for otitis externa treatment and work up of ataxia. The patient was on Bactrim which was not improving. Infection could be resistant to bactim. Patient has a hx of diabetes, would benefit from pseudomonas coverage. Will start ciprofloxacin IVPB. The patient also has conjunctival injection, no blurry vision as per patient. It could be viral conjunctivitis. Will obtain RVP panel for further evaluation. Will c/w latanoprost and artificial tears. Obtain ophthalmology consult. In regards to the ataxia, it could be acute vestibular syndrome in the setting of viral infection and acute otitis externia, however will need to rule out stroke in the setting of risk factors. Will obtain MRI of head with and without contrast. Will start meclizine as recommended by neurology.

## 2021-12-18 NOTE — H&P ADULT - HISTORY OF PRESENT ILLNESS
70 yo M PMHx of COPD, Asthma, ?atrial fibrillation per chart (not on AC), DM, and HTN presenting with complaint of ear pain, headache, and dizziness with nausea. Patient reports he had left ear pain x 1 month he was RX Bactrim for a 10 day course and had improvement in symptoms then on 12/10 patients ear pain returned. He was Rx another 10 day course of PO Bactrim however the next day patient had intermittent dizziness described as room spinning sensation and nausea. Wife at bedside (Mariela) reports at baseline patient ambulates without assistance however has been using cane due to symptoms. Patient reports he was unable to ambulate 2/2 dizziness. Denies syncope, LOC. Patient also reports bilateral eye erythema and pain with movement as well as intermittent bifrontal HA since 12/12. Stabbing in quality, 7/10 in severity at the time of exam. At the worst it was 9-10/10. He has been taking Tylenol which relieves the HA for a while. Denies syncope, LOC, head trauma, fever, chills, vision change, chest pain, dyspnea, palpitations, abdominal pain, nausea, vomiting, melena, hematochezia, lower extremity swelling.       CT internal auditory canals : Soft tissue swelling, with loss of fat planes planes  left ear, pinna, estelle, pre and posterior auricular tissues, left parotid space, left TMJ, with loss left retrocondylar fat pad, with extension toward the left parotid space, left suboccipital,  and parapharyngeal spaces (5:9), involving left external auditory canal correlate for necrotizing otitis externa with known diabetes.  Mild volume loss, microvascular disease, no  hemorrhage or midline shift, orbital proptosis, correlate thyroid function. If symptoms persist consider follow-up head CT or MR if no contraindication. Sclerosis and opacification of bilateral mastoid tips, soft tissue fat   swelling, effaced left fossa of Rosenmuller (5:12), maxillary mucosal    72 yo M PMHx of COPD, Asthma, ?atrial fibrillation per chart (not on AC), DM, and HTN presenting with complaint of ear pain, headache, and dizziness with nausea. Patient reports he had left ear pain x 1 month he was RX Bactrim for a 10 day course and had improvement in symptoms then on 12/10 patients ear pain returned. He was Rx another 10 day course of PO Bactrim however the next day patient had intermittent dizziness described as room spinning sensation and nausea. Wife at bedside (Mariela) reports at baseline patient ambulates without assistance however has been using cane due to symptoms. Patient reports he was unable to ambulate 2/2 dizziness. Denies syncope, LOC. Patient also reports bilateral eye erythema and pain with movement as well as intermittent bifrontal HA since 12/12. Stabbing in quality, 7/10 in severity at the time of exam. At the worst it was 9-10/10. He has been taking Tylenol which relieves the HA for a while. Denies syncope, LOC, head trauma, fever, chills, vision change, chest pain, dyspnea, palpitations, abdominal pain, nausea, vomiting, melena, hematochezia, lower extremity swelling.       In ED vitals: Temp 98.1F HR 69 /88 Sp02 100% RA   Patient is s/p IV NS 1L X 1, IV Cipro 400mg x 1, IV Clinda 600mg x 1, and Ciprodex otic drops   CTH negative for acute pathology but shows multiple areas of remote and age-indeterminant lacunar infarcts. CT IAC concerning for necrotizing otitis externa.  Patient was seen and evaluated by ENT and Neurology  72 yo M PMHx of COPD, Asthma, ?atrial fibrillation per chart (not on AC), DM, and HTN presenting with complaint of ear pain, headache, and dizziness with nausea. Patient reports he had left ear pain x 1 month he was RX Bactrim for a 10 day course and had improvement in symptoms then on 12/10 patients ear pain returned. He was Rx another 10 day course of PO Bactrim however the next day patient had intermittent dizziness described as room spinning sensation and nausea. Wife at bedside (Mariela) reports at baseline patient ambulates without assistance however has been using cane due to symptoms. Patient reports he was unable to ambulate 2/2 dizziness. Denies syncope, LOC. Patient also reports bilateral eye erythema and pain with movement as well as intermittent 9/10 frontal headache described as stabbing x 6 days. He has been taking Tylenol for mild relief. Denies syncope, LOC, head trauma, fever, chills, vision change, chest pain, dyspnea, palpitations, abdominal pain, nausea, vomiting, melena, hematochezia, lower extremity swelling.       In ED vitals: Temp 98.1F HR 69 /88 Sp02 100% RA   Patient is s/p IV NS 1L X 1, IV Cipro 400mg x 1, IV Clinda 600mg x 1, and Ciprodex otic drops   CTH negative for acute pathology but shows multiple areas of remote and age-indeterminant lacunar infarcts. CT IAC concerning for necrotizing otitis externa.  Patient was seen and evaluated by ENT and Neurology  72 yo M PMHx of COPD, Asthma, ?atrial fibrillation per chart (not on AC), Ulcerative Colitis, DM (no longer on insulin) and HTN presenting with complaint of ear pain, headache, and dizziness with nausea. Patient reports he had left ear pain x 1 month he was RX Bactrim for a 10 day course and had improvement in symptoms then on 12/10/21 patients ear pain returned. He was Rx another 10 day course of PO Bactrim however the next day patient had intermittent dizziness described as room spinning sensation and nausea. Wife at bedside (Mariela) reports at baseline patient ambulates without assistance however has been using cane due to symptoms. Patient reports he was unable to ambulate 2/2 dizziness. Denies syncope, LOC. Patient also reports bilateral eye erythema and pain with movement as well as intermittent 9/10 frontal headache described as stabbing x 6 days. He has been taking Tylenol for mild relief. Denies syncope, LOC, head trauma, fever, chills, vision change, chest pain, dyspnea, palpitations, abdominal pain, nausea, vomiting, melena, hematochezia, lower extremity swelling.       In ED vitals: Temp 98.1F HR 69 /88 Sp02 100% RA   Patient is s/p IV NS 1L X 1, IV Cipro 400mg x 1, IV Clinda 600mg x 1, and Ciprodex otic drops   CTH negative for acute pathology but shows multiple areas of remote and age-indeterminant lacunar infarcts. CT IAC concerning for necrotizing otitis externa.  Patient was seen and evaluated by ENT and Neurology

## 2021-12-18 NOTE — CONSULT NOTE ADULT - ASSESSMENT
Assessment: 70 yo RH man with a PMHx of CHF, COPD, asthma, ?atrial fibrillation per chart (not on AC), DM, and HTN who presents to the ED for L ear pain/swelling, eye pain OU at rest and with EOM associated with severely injected conjunctiva OU, HA, and dizziness associated with nausea. Patient states he was initially treated for chondritis AS by outpatient ENT in November. At that time, he completed a 10-day course of Bactrim. His symptoms improved. However, on 12/10-12/11 the symptoms began to return. He was prescribed a second 10-day course of Bactrim which he began taking on 12/11. On 12/12, he developed intermittent room-spinning dizziness associated with nausea. Occurs with movement and at rest, each episode lasts ~10 seconds or less. Patient also reports intermittent bifrontal HA since 12/12. CTH negative for acute pathology but shows multiple areas of remote and age-indeterminant lacunar infarcts. CT IAC concerning for necrotizing otitis externa. Had three episodes of room-spinning dizziness during neurology assessment, each lasting <10 seconds. These episodes appeared to occur after positional changes.    Impression: Acute vestibular syndrome in patient with chondritis AS and CT IAC concerning for necrotizing otitis externa. Clinical picture more consistent with peripheral etiology (BPPV, vestibular neuritis, etc) however will r/o acute ischemic stroke given multiple risk factors.    Recommendations:  [] Telemetry. Neuro checks Q4HR.  [] MRI Head w/wo contrast with thin cuts through the IAC.  [] c/w home dose ASA 81MG PO QD.  [] c/w home dose statin.  [] Should clarify with patient/PCP if patient has a confirmed diagnosis of atrial fibrillation. If so, then patient should be on full dose AC from neurology standpoint.  [] TTE.  [] Send HbA1c, Lipid panel, TSH, B12, Folate.  [] Can trial Meclizine 25MG PO QHR standing for 24 hours to see if this decreased/eliminates the vertiginous episodes. If patient does not notice any improvement after 24 hours then would discontinue.  [] PT/OT evals.  [] Treatment of chondritis per ENT.  [] Rest of care per primary team.    Case to be discussed with neurology attending Dr. Tipton.

## 2021-12-18 NOTE — H&P ADULT - NSHPPHYSICALEXAM_GEN_ALL_CORE
Vital Signs Last 24 Hrs  T(C): 37.1 (18 Dec 2021 03:00), Max: 37.2 (17 Dec 2021 14:52)  T(F): 98.7 (18 Dec 2021 03:00), Max: 99 (17 Dec 2021 14:52)  HR: 80 (18 Dec 2021 03:00) (62 - 80)  BP: 146/77 (18 Dec 2021 03:00) (113/76 - 161/89)  BP(mean): --  RR: 18 (18 Dec 2021 03:00) (16 - 18)  SpO2: 97% (18 Dec 2021 03:00) (97% - 100%)    PHYSICAL EXAM:  General: Elderly male resting in no acute distress   Head: Normocephalic, atraumatic.    Eyes: PERRL.  EOMI. +Pain with EOM + Bilateral eye erythema   Mouth: Moist MM.  No oropharyngeal exudates.    Neck: Supple.  Full range of motion.  Heart: RRR.  Normal S1 and S2.    Lungs: Nonlabored breathing.  Good inspiratory effort.  CTAB.    Abdomen: BS+, soft, NT/ND.  No hepatomegaly.   Skin: Warm and dry.  No rashes.  Extremities: No cyanosis.  2+ peripheral pulses b/l. No LE edema b/l.   Musculoskeletal: No joint deformities.  No spinal or paraspinal tenderness.  Neuro: A&Ox3.  CN II-XII intact.  5/5 motor strength in UE and LE b/l.  Tactile sensation intact in UE and LE b/l.  Cerebellar function intact as assessed by finger-to-nose test.

## 2021-12-18 NOTE — DISCHARGE NOTE PROVIDER - CARE PROVIDER_API CALL
Faraz Lloyd  GERIATRIC MEDICINE  [address] [place] [address]  Phone: [phone]  Fax: [phone]  Follow Up Time: 1 week   Faraz Llody  GERIATRIC MEDICINE  [address] [place] [address]  Phone: [phone]  Fax: [phone]  Follow Up Time: 1 week    Tigre Wynn)  Ophthalmology  [address] [place] [address]  Phone: [phone]  Fax: [phone]  Follow Up Time:    Faraz Lloyd  GERIATRIC MEDICINE  [address] [place] [address]  Phone: [phone]  Fax: [phone]  Follow Up Time: 1 week    Tigre Wynn)  Ophthalmology  [address] [place] [address]  Phone: [phone]  Fax: [phone]  Follow Up Time:     LAVELLE COBB  Internal Medicine  [address] [place] [address]  Phone: [phone]  Fax: [phone]  Follow Up Time:

## 2021-12-18 NOTE — DISCHARGE NOTE PROVIDER - NSDCMRMEDTOKEN_GEN_ALL_CORE_FT
aspirin 81 mg oral delayed release tablet: 1 tab(s) orally once a day  atorvastatin 40 mg oral tablet: 1 tab(s) orally once a day (at bedtime)  carvedilol 12.5 mg oral tablet: 1 tab(s) orally 2 times a day  latanoprost 0.005% ophthalmic solution: 1 drop(s) to each affected eye once a day (in the evening)  Lomotil 2.5 mg-0.025 mg oral tablet: 1 tab(s) orally 2 times a day  losartan 100 mg oral tablet: 1 tab(s) orally once a day  Proventil HFA 90 mcg/inh inhalation aerosol: 2 puff(s) inhaled 4 times a day, As Needed  Singulair 10 mg oral tablet: 1 tab(s) orally once a day (in the evening)  Symbicort 80 mcg-4.5 mcg/inh inhalation aerosol: 2 puff(s) inhaled 2 times a day   acetaminophen 325 mg oral tablet: 2 tab(s) orally every 6 hours, As needed, Mild Pain (1 - 3), Moderate Pain (4 - 6)  acetaZOLAMIDE 250 mg oral tablet: 2 tab(s) orally 2 times a day  aspirin 81 mg oral delayed release tablet: 1 tab(s) orally once a day  atorvastatin 40 mg oral tablet: 1 tab(s) orally once a day (at bedtime)  brimonidine 0.2% ophthalmic solution: 1 drop(s) to each affected eye 3 times a day  carvedilol 12.5 mg oral tablet: 1 tab(s) orally 2 times a day  dorzolamide-timolol 2%-0.5% preservative-free ophthalmic solution: 1 drop(s) to each affected eye every 12 hours  fluconazole 200 mg oral tablet: 1 tab(s) orally once a day x8 days to complete 14d course  latanoprost 0.005% ophthalmic solution: 1 drop(s) to each affected eye once a day (in the evening)  Lomotil 2.5 mg-0.025 mg oral tablet: 1 tab(s) orally 2 times a day  losartan 100 mg oral tablet: 1 tab(s) orally once a day  meclizine 12.5 mg oral tablet: 1 tab(s) orally 2 times a day  Multiple Vitamins oral tablet: 1 tab(s) orally once a day  ocular lubricant ophthalmic solution: 1 drop(s) to each affected eye 4 times a day  Proventil HFA 90 mcg/inh inhalation aerosol: 2 puff(s) inhaled 4 times a day, As Needed  Singulair 10 mg oral tablet: 1 tab(s) orally once a day (in the evening)  Symbicort 80 mcg-4.5 mcg/inh inhalation aerosol: 2 puff(s) inhaled 2 times a day

## 2021-12-18 NOTE — PROGRESS NOTE ADULT - PROBLEM SELECTOR PLAN 3
- pt with bilateral conjunctivitis   - no purulent eye discharge   - CT showing orbital proptosis - check TSH in AM  - will continue latanoprost eye drops  - optho c/s in AM - pt with bilateral conjunctivitis   - no purulent eye discharge   - CT showing orbital proptosis - f/u TSH  - will continue latanoprost eye drops  - optho c/s in AM - pt with bilateral conjunctivitis   - no purulent eye discharge   - CT showing orbital proptosis - f/u TSH  - will continue latanoprost eye drops  - f/u optho Possibly reactive 2/2 infection

## 2021-12-18 NOTE — H&P ADULT - PROBLEM SELECTOR PLAN 1
- pt reports dizziness with rest and worse with ambulating  - dizziness possibly 2/2 inner ear etiology vs stroke  - Neuro following  - check EKG  - neuro checks q 4 hours  - aspiration/fall/safety precautions  - trial meclizine prn   - TTE w bubble study  - MRI head - pt reports dizziness with rest and worse with ambulating  - dizziness possibly 2/2 inner ear etiology vs stroke  - Neuro following  - check EKG  - Diet NPO pending dysphagia screen  - it pt passes dysphagia screen can start Pureed diet and resume home meds  - continue asa/statin   - neuro checks q 4 hours  - aspiration/fall/safety precautions  - trial meclizine prn   - TTE w bubble study  - MRI head   - PT c/s - pt reports dizziness with rest and worse with ambulating  - dizziness possibly 2/2 inner ear etiology vs stroke  - Neuro following  - check EKG  - Diet NPO pending dysphagia screen  - it pt passes dysphagia screen can start Pureed diet and resume home meds  - continue asa/statin   - neuro checks q 4 hours  - aspiration/fall/safety precautions  - trial meclizine prn   - TTE w bubble study  - MRI head   - OT/PT c/s

## 2021-12-18 NOTE — PROGRESS NOTE ADULT - PROBLEM SELECTOR PLAN 2
- pt reports left ear pain and swelling x 1 month  - pt is s/p PO bactrim outpatient   - CT concerning for necrotizing otitis externa  - pt is s/p IV Cipro and IV clinda in ER  - ENT following  - Rec Ciprofloxacin for chondritis to cover for pseudomonas  - Will continue IV Cipro    - F/U MRI - pt reports dizziness with rest and worse with ambulating  - dizziness possibly 2/2 inner ear etiology vs stroke  - Neuro following  - continue asa/statin   - neuro checks q 4 hours  - aspiration/fall/safety precautions  - trial meclizine 25  - f/u TTE  - MRI head   - OT/PT c/s  - unclear history of afib. Patient denies. Will f/u with outpatient PCP - pt reports dizziness with rest and worse with ambulating  - dizziness possibly 2/2 inner ear etiology vs stroke  - Neuro following  - continue asa/statin   - neuro checks q 4 hours  - aspiration/fall/safety precautions  - trial meclizine 25  - f/u TTE, MRI head/MRI Internal Auditory Canal   - OT/PT c/s  - unclear history of afib. Patient denies. Will f/u with outpatient PCP

## 2021-12-18 NOTE — PHYSICAL THERAPY INITIAL EVALUATION ADULT - PERTINENT HX OF CURRENT PROBLEM, REHAB EVAL
71 year old Male PMH: of COPD, Asthma, atrial fibrillation, Ulcerative Colitis, DM and HTN presenting with complaint of ear pain, headache, and dizziness with nausea.

## 2021-12-18 NOTE — DISCHARGE NOTE PROVIDER - HOSPITAL COURSE
72 yo M PMHx of COPD, Asthma, ?atrial fibrillation per chart (not on AC), Ulcerative Colitis, DM (no longer on insulin) and HTN presenting with complaint of ear pain, headache, and dizziness with nausea. Patient reports he had left ear pain x 1 month he was RX Bactrim for a 10 day course and had improvement in symptoms then on 12/10/21 patients ear pain returned. He was Rx another 10 day course of PO Bactrim however the next day patient had intermittent dizziness described as room spinning sensation and nausea. Wife at bedside (Mariela) reports at baseline patient ambulates without assistance however has been using cane due to symptoms. Patient reports he was unable to ambulate 2/2 dizziness. Denies syncope, LOC. Patient also reports bilateral eye erythema and pain with movement as well as intermittent 9/10 frontal headache described as stabbing x 6 days. He has been taking Tylenol for mild relief. Denies syncope, LOC, head trauma, fever, chills, vision change, chest pain, dyspnea, palpitations, abdominal pain, nausea, vomiting, melena, hematochezia, lower extremity swelling.     In ED vitals: Temp 98.1F HR 69 /88 Sp02 100% RA   Patient is s/p IV NS 1L X 1, IV Cipro 400mg x 1, IV Clinda 600mg x 1, and Ciprodex otic drops   CTH negative for acute pathology but shows multiple areas of remote and age-indeterminant lacunar infarcts. CT IAC concerning for necrotizing otitis externa.  Patient was seen and evaluated by ENT and Neurology. ENT rec ciprofloxacin for chondritis to cover for pseudomonas. Neuro recommended TTE, MRI head 72 yo M PMHx of COPD, Asthma, ?atrial fibrillation per chart (not on AC), Ulcerative Colitis, DM (no longer on insulin) and HTN presenting with complaint of ear pain, headache, and dizziness with nausea. Patient reports he had left ear pain x 1 month he was RX Bactrim for a 10 day course and had improvement in symptoms then on 12/10/21 patients ear pain returned. He was Rx another 10 day course of PO Bactrim however the next day patient had intermittent dizziness described as room spinning sensation and nausea. Wife at bedside (Mariela) reports at baseline patient ambulates without assistance however has been using cane due to symptoms. Patient reports he was unable to ambulate 2/2 dizziness. Denies syncope, LOC. Patient also reports bilateral eye erythema and pain with movement as well as intermittent 9/10 frontal headache described as stabbing x 6 days. He has been taking Tylenol for mild relief. Denies syncope, LOC, head trauma, fever, chills, vision change, chest pain, dyspnea, palpitations, abdominal pain, nausea, vomiting, melena, hematochezia, lower extremity swelling.     In ED vitals: Temp 98.1F HR 69 /88 Sp02 100% RA   Patient is s/p IV NS 1L X 1, IV Cipro 400mg x 1, IV Clinda 600mg x 1, and Ciprodex otic drops   CTH negative for acute pathology but shows multiple areas of remote and age-indeterminant lacunar infarcts. CT IAC concerning for necrotizing otitis externa.  Patient was seen and evaluated by ENT, Neurology, and Ophthalmology. ENT rec ciprofloxacin for chondritis to cover for pseudomonas. Neuro recommended TTE, MRI head/ICA and trial of meclizine. Optho recommended ofloxacin and erythromycin for both eyes.    TTE showed __________________________.   MRI showed _________________________. Patient was continued on IV ciprofloxacin for ___________ days. Trial of meclizine without great effect.     Patient was seen by PT who recommended rehab. 70 yo M PMHx of COPD, Asthma, ?atrial fibrillation per chart (not on AC), Ulcerative Colitis, DM (no longer on insulin) and HTN presenting with complaint of ear pain, headache, and dizziness with nausea. Patient reports he had left ear pain x 1 month he was RX Bactrim for a 10 day course and had improvement in symptoms then on 12/10/21 patients ear pain returned. He was Rx another 10 day course of PO Bactrim however the next day patient had intermittent dizziness described as room spinning sensation and nausea. Wife at bedside (Mariela) reports at baseline patient ambulates without assistance however has been using cane due to symptoms. Patient reports he was unable to ambulate 2/2 dizziness. Denies syncope, LOC. Patient also reports bilateral eye erythema and pain with movement as well as intermittent 9/10 frontal headache described as stabbing x 6 days. He has been taking Tylenol for mild relief. Denies syncope, LOC, head trauma, fever, chills, vision change, chest pain, dyspnea, palpitations, abdominal pain, nausea, vomiting, melena, hematochezia, lower extremity swelling.     In ED vitals: Temp 98.1F HR 69 /88 Sp02 100% RA   Patient is s/p IV NS 1L X 1, IV Cipro 400mg x 1, IV Clinda 600mg x 1, and Ciprodex otic drops   CTH negative for acute pathology but shows multiple areas of remote and age-indeterminant lacunar infarcts. CT IAC concerning for necrotizing otitis externa.  Patient was seen and evaluated by ENT, Neurology, and Ophthalmology. ENT rec ciprofloxacin for chondritis to cover for pseudomonas. Neuro recommended TTE, MRI head/ICA and trial of meclizine. Optho recommended ofloxacin and erythromycin for both eyes.    TTE showed ejection fraction of 66%.   MRI showed _________________________. Patient was continued on a course of IV and PO antibiotics. (ciprofloxacin) Trial of meclizine without great effect.     Patient was seen by PT who recommended rehab. 70 yo M PMHx of COPD, Asthma, ?atrial fibrillation per chart (not on AC), Ulcerative Colitis, DM (no longer on insulin) and HTN presenting with complaint of ear pain, headache, and dizziness with nausea. Patient reports he had left ear pain x 1 month he was RX Bactrim for a 10 day course and had improvement in symptoms then on 12/10/21 patients ear pain returned. He was Rx another 10 day course of PO Bactrim however the next day patient had intermittent dizziness described as room spinning sensation and nausea. Wife at bedside (Mariela) reports at baseline patient ambulates without assistance however has been using cane due to symptoms. Patient reports he was unable to ambulate 2/2 dizziness. Denies syncope, LOC. Patient also reports bilateral eye erythema and pain with movement as well as intermittent 9/10 frontal headache described as stabbing x 6 days. He has been taking Tylenol for mild relief. Denies syncope, LOC, head trauma, fever, chills, vision change, chest pain, dyspnea, palpitations, abdominal pain, nausea, vomiting, melena, hematochezia, lower extremity swelling.     In ED vitals: Temp 98.1F HR 69 /88 Sp02 100% RA   Patient is s/p IV NS 1L X 1, IV Cipro 400mg x 1, IV Clinda 600mg x 1, and Ciprodex otic drops   CTH negative for acute pathology but shows multiple areas of remote and age-indeterminant lacunar infarcts. CT IAC concerning for necrotizing otitis externa.  Patient was seen and evaluated by ENT, Neurology, and Ophthalmology. ENT rec ciprofloxacin for chondritis to cover for pseudomonas. Neuro recommended TTE, MRI head/ICA and trial of meclizine. Optho recommended ofloxacin and erythromycin for both eyes. On repeat evaluation noted increased intraocular pressure in the left eye. Concerned for possible acute angle glaucoma. Acetazolamide and pressure reducing drops were added to regimen. He is due to follow-up in their office on Monday 1/3 for possible laser correction.    TTE showed ejection fraction of 66%.   MRI showed no acute intracranial findings concerning for mass or internal auditory canal involvement. Patient completed 10d course of IV and PO ciprofloxacin for simple otitis externa. Trial of meclizine performed with some benefit to patient. Patient additionally complained of sensation of pills getting caught in chest. Barium esophagram performed that revealed abnormal teritiary non-peristaltic contractions. GI evaluated the patient and recommended outpatient follow-up for EGD +/- manometry. A 14d course of fluconazole was recommended for empiric treatment of candida esophagitis.     Patient had covid exposure during hospitalization. Tested negative on day 0 and day 5 (12/30) following exposure.    Patient was seen by PT who recommended rehab. He is hemodynamically stable and ready for discharge with close follow-up on Monday 1/3 with Ophthalmology.

## 2021-12-18 NOTE — PROGRESS NOTE ADULT - PROBLEM SELECTOR PLAN 10
DVT ppx: Lovenox SQ  Diet: Carb consistent  Dispo: Pending clinical improvement DVT ppx: Lovenox SQ  Diet: Carb consistent  Dispo: Pending clinical improvement  Dispo: PT rec rehab

## 2021-12-18 NOTE — DISCHARGE NOTE PROVIDER - NSDCFUADDAPPT_GEN_ALL_CORE_FT
You have an appointment with Dr. Wynn on 1/3 at 1230 PM at the office below.    [facility] [place] [address]. Please make sure to coordinate ride to this appointment as it is extremely important for your care.

## 2021-12-18 NOTE — PROGRESS NOTE ADULT - PROBLEM SELECTOR PLAN 9
DVT ppx: Lovenox SQ  Diet: Carb consistent  Dispo: Pending clinical improvement C/w home lipitor C/w home lipitor  Lipid panel shows slightly low HDL, slightly high LDL

## 2021-12-18 NOTE — CONSULT NOTE ADULT - SUBJECTIVE AND OBJECTIVE BOX
DENNIS CERVANTES  Male  MRN-[id number]    HPI: 70 yo RH man with a PMHx of CHF, COPD, asthma, ?atrial fibrillation per chart (not on AC), DM, and HTN who presents to the ED for L ear pain/swelling, eye pain OU at rest and with EOM associated with severely injected conjunctiva OU, HA, and dizziness associated with nausea. Patient states he was initially treated for chondritis AS by outpatient ENT in November. At that time, he completed a 10-day course of Bactrim. His symptoms improved. However, on 12/10-12/11 the symptoms began to return. He was prescribed a second 10-day course of Bactrim which he began taking on 12/11. On 12/12, he developed intermittent room-spinning dizziness associated with nausea. Occurs with movement and at rest, each episode lasts ~10 seconds or less. He has been unable to ambulate due to this dizziness and has remained largely bedbound over the past week. On 12/15, he noticed his eyes started to become red. He called his ENT who recommended he stop taking the Bactrim and to come to the ED for further evaluation. He took the last dose of Bactrim 12/16 AM. CTH negative for acute pathology but shows multiple areas of remote and age-indeterminant lacunar infarcts. CT IAC concerning for necrotizing otitis externa. Patient also reports intermittent bifrontal HA since 12/12. Stabbing in quality, 7/10 in severity at the time of exam. At the worst it was 9-10/10. He has been taking Tylenol which relieves the HA for a while. At baseline he is independent with all ADLs and ambulates without assistive device. He has been staying in bed for the most part since Sunday but when he gets up to use the bathroom or walk around inside his home he has been using a cane. Had three episodes of room-spinning dizziness during neurology assessment, each lasting <10 seconds. These episodes appeared to occur after positional changes. Denies vision changes, speech changes, numbness/tingling, unilateral weakness.    NIHSS: 0  MRS: 0    ROS: All negative except as mentioned in HPI.    PAST MEDICAL & SURGICAL HISTORY:  CHF (Congestive Heart Failure)    COPD (Chronic Obstructive Pulmonary Disease)    Dyslipidemia    HTN (Hypertension)    S/P Cardiac Catheterization  2008-[facility] no stents    Asthma    Colitis    Hemorrhoid    Atrial fibrillation    Diabetes mellitus  type 2    History of Total Knee Replacement  bilateral    History of Appendectomy    S/P tonsillectomy    Allergies    penicillin (Rash)    Vital Signs Last 24 Hrs  T(C): 36.7 (17 Dec 2021 23:22), Max: 37.2 (17 Dec 2021 14:52)  T(F): 98.1 (17 Dec 2021 23:22), Max: 99 (17 Dec 2021 14:52)  HR: 69 (17 Dec 2021 23:22) (62 - 78)  BP: 153/88 (17 Dec 2021 23:22) (113/76 - 161/89)  RR: 16 (17 Dec 2021 19:07) (16 - 16)  SpO2: 100% (17 Dec 2021 23:22) (97% - 100%)    General Exam:  Constitutional: Lying on stretcher, NAD.  HEENT: Normocephalic, atraumatic. Severely injected conjunctiva OU. Erythematous pinna with edematous cartilage and lobule AS.  Extremities: No edema.    Neuro Exam:   MS: Alert, eyes open spontaneously. Oriented to self, age, location, month, year. Speech is fluent, not slurred. Follows commands.  CN: PERRL. VFF. EOMI, no nystagmus. Correctional saccades to the left on head impulse testing turning head to the left. Test of skew negative. V1-V3 intact. Face symmetric. Tongue midline.   Motor: All extremities antigravity without drift.   Sensory: Intact to LT throughout. No extinction.  Coordination: No dysmetria on FNF or on HTS bilaterally.  Gait: (With one person assist) Wide-based gait. Unable to take more than 2-3 steps before feeling very off-balance. Romberg negative. No truncal ataxia.    LABS:               10.3   4.74  )-----------( 544      ( 17 Dec 2021 17:50 )             36.6     12-17    135  |  98  |  10  ----------------------------<  91  4.3   |  27  |  1.12    Ca    9.0      17 Dec 2021 17:50    TPro  8.6<H>  /  Alb  3.2<L>  /  TBili  0.2  /  DBili  x   /  AST  21  /  ALT  15  /  AlkPhos  119  12-17    RADIOLOGY:    -12/17 CTH w/o contrast, CT Orbit w/ IV contrast, and CT Internal Auditory Canals w/o contrast: Soft tissue swelling, with loss of fat planes left ear, pinna, estelle, pre and posterior auricular tissues, left parotid space, left TMJ, with loss left retrocondylar fat pad, with extension toward the left parotid space, left suboccipital,  and parapharyngeal spaces (5:9), involving left external auditory canal correlate for necrotizing otitis externa with known diabetes. Mild volume loss, microvascular disease, no  hemorrhage or midline shift, orbital proptosis, correlate thyroid function. If symptoms persist consider follow-up head CT or MR if no contraindication. Sclerosis and opacification of bilateral mastoid tips, soft tissue fat swelling, effaced left fossa of Rosenmuller (5:12), maxillary mucosal thickening retention cysts polyps, dental disease.

## 2021-12-18 NOTE — PROGRESS NOTE ADULT - PROBLEM SELECTOR PLAN 8
DVT ppx: Lovenox SQ  Diet: Carb consistent  Dispo: Pending clinical improvement C/w home lipitor C/w home carvedilol, losartan

## 2021-12-18 NOTE — PROGRESS NOTE ADULT - SUBJECTIVE AND OBJECTIVE BOX
DATE OF SERVICE: 12-18-21 @ 07:10    Patient is a 71y old  Male who presents with a chief complaint of dizziness, ear pain (18 Dec 2021 04:42)      SUBJECTIVE / OVERNIGHT EVENTS:    MEDICATIONS  (STANDING):  artificial tears (preservative free) Ophthalmic Solution 1 Drop(s) Both EYES every 6 hours  budesonide  80 MICROgram(s)/formoterol 4.5 MICROgram(s) Inhaler 2 Puff(s) Inhalation two times a day  ciprofloxacin   IVPB 400 milliGRAM(s) IV Intermittent every 12 hours  enoxaparin Injectable 40 milliGRAM(s) SubCutaneous daily  latanoprost 0.005% Ophthalmic Solution 1 Drop(s) Both EYES at bedtime  montelukast 10 milliGRAM(s) Oral at bedtime    MEDICATIONS  (PRN):  ALBUTerol    90 MICROgram(s) HFA Inhaler 2 Puff(s) Inhalation every 6 hours PRN Shortness of Breath and/or Wheezing      Vital Signs Last 24 Hrs  T(C): 37.1 (18 Dec 2021 03:00), Max: 37.2 (17 Dec 2021 14:52)  T(F): 98.7 (18 Dec 2021 03:00), Max: 99 (17 Dec 2021 14:52)  HR: 80 (18 Dec 2021 03:00) (62 - 80)  BP: 146/77 (18 Dec 2021 03:00) (113/76 - 161/89)  BP(mean): --  RR: 18 (18 Dec 2021 03:00) (16 - 18)  SpO2: 97% (18 Dec 2021 03:00) (97% - 100%)  CAPILLARY BLOOD GLUCOSE      POCT Blood Glucose.: 114 mg/dL (17 Dec 2021 14:56)    I&O's Summary      PHYSICAL EXAM:  GENERAL: NAD, well-developed  HEAD:  Atraumatic, Normocephalic  EYES: EOMI, PERRLA, conjunctiva and sclera clear  NECK: Supple, No JVD  CHEST/LUNG: Clear to auscultation bilaterally; No wheeze  HEART: Regular rate and rhythm; No murmurs, rubs, or gallops  ABDOMEN: Soft, Nontender, Nondistended; Bowel sounds present  EXTREMITIES:  2+ Peripheral Pulses, No clubbing, cyanosis, or edema  PSYCH: AAOx3  NEUROLOGY: non-focal  SKIN: No rashes or lesions    LABS:                        10.3   4.74  )-----------( 544      ( 17 Dec 2021 17:50 )             36.6     12-17    135  |  98  |  10  ----------------------------<  91  4.3   |  27  |  1.12    Ca    9.0      17 Dec 2021 17:50    TPro  8.6<H>  /  Alb  3.2<L>  /  TBili  0.2  /  DBili  x   /  AST  21  /  ALT  15  /  AlkPhos  119  12-17              RADIOLOGY & ADDITIONAL TESTS:    Imaging Personally Reviewed:    Consultant(s) Notes Reviewed:      Care Discussed with Consultants/Other Providers:   DATE OF SERVICE: 12-18-21 @ 07:10    Patient is a 71y old  Male who presents with a chief complaint of dizziness, ear pain (18 Dec 2021 04:42)      SUBJECTIVE / OVERNIGHT EVENTS: Patient admitted overnight. He states that he has improvement in ear pain, but still has decreased hearing.     MEDICATIONS  (STANDING):  artificial tears (preservative free) Ophthalmic Solution 1 Drop(s) Both EYES every 6 hours  budesonide  80 MICROgram(s)/formoterol 4.5 MICROgram(s) Inhaler 2 Puff(s) Inhalation two times a day  ciprofloxacin   IVPB 400 milliGRAM(s) IV Intermittent every 12 hours  enoxaparin Injectable 40 milliGRAM(s) SubCutaneous daily  latanoprost 0.005% Ophthalmic Solution 1 Drop(s) Both EYES at bedtime  montelukast 10 milliGRAM(s) Oral at bedtime    MEDICATIONS  (PRN):  ALBUTerol    90 MICROgram(s) HFA Inhaler 2 Puff(s) Inhalation every 6 hours PRN Shortness of Breath and/or Wheezing      Vital Signs Last 24 Hrs  T(C): 37.1 (18 Dec 2021 03:00), Max: 37.2 (17 Dec 2021 14:52)  T(F): 98.7 (18 Dec 2021 03:00), Max: 99 (17 Dec 2021 14:52)  HR: 80 (18 Dec 2021 03:00) (62 - 80)  BP: 146/77 (18 Dec 2021 03:00) (113/76 - 161/89)  BP(mean): --  RR: 18 (18 Dec 2021 03:00) (16 - 18)  SpO2: 97% (18 Dec 2021 03:00) (97% - 100%)  CAPILLARY BLOOD GLUCOSE      POCT Blood Glucose.: 114 mg/dL (17 Dec 2021 14:56)    I&O's Summary      PHYSICAL EXAM:  GENERAL: NAD, well-developed  HEAD:  Atraumatic, Normocephalic  EYES: EOMI, PERRLA, conjunctiva and sclera clear  NECK: Supple, No JVD  CHEST/LUNG: Clear to auscultation bilaterally; No wheeze  HEART: Regular rate and rhythm; No murmurs, rubs, or gallops  ABDOMEN: Soft, Nontender, Nondistended; Bowel sounds present  EXTREMITIES:  2+ Peripheral Pulses, No clubbing, cyanosis, or edema  PSYCH: AAOx3  NEUROLOGY: non-focal  SKIN: No rashes or lesions    LABS:                        10.3   4.74  )-----------( 544      ( 17 Dec 2021 17:50 )             36.6     12-17    135  |  98  |  10  ----------------------------<  91  4.3   |  27  |  1.12    Ca    9.0      17 Dec 2021 17:50    TPro  8.6<H>  /  Alb  3.2<L>  /  TBili  0.2  /  DBili  x   /  AST  21  /  ALT  15  /  AlkPhos  119  12-17              RADIOLOGY & ADDITIONAL TESTS:    Imaging Personally Reviewed:    Consultant(s) Notes Reviewed:      Care Discussed with Consultants/Other Providers:   DATE OF SERVICE: 12-18-21 @ 07:10    Patient is a 71y old  Male who presents with a chief complaint of dizziness, ear pain (18 Dec 2021 04:42)      SUBJECTIVE / OVERNIGHT EVENTS: Patient admitted overnight. He states that he has improvement in ear pain, but still has decreased hearing.     MEDICATIONS  (STANDING):  artificial tears (preservative free) Ophthalmic Solution 1 Drop(s) Both EYES every 6 hours  budesonide  80 MICROgram(s)/formoterol 4.5 MICROgram(s) Inhaler 2 Puff(s) Inhalation two times a day  ciprofloxacin   IVPB 400 milliGRAM(s) IV Intermittent every 12 hours  enoxaparin Injectable 40 milliGRAM(s) SubCutaneous daily  latanoprost 0.005% Ophthalmic Solution 1 Drop(s) Both EYES at bedtime  montelukast 10 milliGRAM(s) Oral at bedtime    MEDICATIONS  (PRN):  ALBUTerol    90 MICROgram(s) HFA Inhaler 2 Puff(s) Inhalation every 6 hours PRN Shortness of Breath and/or Wheezing      Vital Signs Last 24 Hrs  T(C): 37.1 (18 Dec 2021 03:00), Max: 37.2 (17 Dec 2021 14:52)  T(F): 98.7 (18 Dec 2021 03:00), Max: 99 (17 Dec 2021 14:52)  HR: 80 (18 Dec 2021 03:00) (62 - 80)  BP: 146/77 (18 Dec 2021 03:00) (113/76 - 161/89)  BP(mean): --  RR: 18 (18 Dec 2021 03:00) (16 - 18)  SpO2: 97% (18 Dec 2021 03:00) (97% - 100%)  CAPILLARY BLOOD GLUCOSE      POCT Blood Glucose.: 114 mg/dL (17 Dec 2021 14:56)    I&O's Summary      PHYSICAL EXAM:  GENERAL: NAD, well-developed  HEAD:  Atraumatic, Normocephalic  EYES: +pain w/ superior eye gaze, PERRLA, +erythema of bilateral conjunctiva  EARS: +TTP of left ear. Increased erythema and edema of cartilage of left ear  NECK: Supple, No JVD, no lymphadenopathy, thyroid normal to palpation  CHEST/LUNG: Clear to auscultation bilaterally; No wheeze  HEART: Regular rate and rhythm; No murmurs, rubs, or gallops  ABDOMEN: Soft, Nontender, Nondistended; Bowel sounds present  EXTREMITIES:  2+ Peripheral Pulses, No clubbing, cyanosis, or edema  PSYCH: AAOx3  NEUROLOGY: CN 2-7 intact, 9-12 intact. Decreased hearing in bilateral ears. 5/5 strength in UE, LE bilaterally. Sensations equal and intact throughout. Normal finger to nose, heel to shin test.  SKIN: No rashes or lesions    LABS:                        10.3   4.74  )-----------( 544      ( 17 Dec 2021 17:50 )             36.6     12-17    135  |  98  |  10  ----------------------------<  91  4.3   |  27  |  1.12    Ca    9.0      17 Dec 2021 17:50    TPro  8.6<H>  /  Alb  3.2<L>  /  TBili  0.2  /  DBili  x   /  AST  21  /  ALT  15  /  AlkPhos  119  12-17              RADIOLOGY & ADDITIONAL TESTS:  < from: CT Internal Auditory Canals No Cont (12.17.21 @ 19:30) >  IMPRESSION:    Soft tissue swelling, with loss of fat planes planes  left ear, pinna,   estelle, pre and posterior auriculartissues, left parotid space, left   TMJ, with loss left retrocondylar fat pad, with extension toward the left   parotid space, left suboccipital,  and parapharyngeal spaces   (5:9), involving left external auditory canal correlate for necrotizing   otitis externa with known diabetes.    Mild volume loss, microvascular disease, no  hemorrhage or midline shift,    orbital proptosis, correlate thyroid function. If symptoms persist   consider follow-up head CT or MR if no contraindication.    Sclerosis and opacification of bilateral mastoid tips, soft tissue fat   swelling, effaced left fossa of Rosenmuller (5:12), maxillary mucosal   thickening retention cysts polyps, dental disease.    < end of copied text >    Imaging Personally Reviewed:    Consultant(s) Notes Reviewed:      Care Discussed with Consultants/Other Providers:

## 2021-12-18 NOTE — DISCHARGE NOTE PROVIDER - NSDCCPCAREPLAN_GEN_ALL_CORE_FT
PRINCIPAL DISCHARGE DIAGNOSIS  Diagnosis: Malignant otitis externa of left ear  Assessment and Plan of Treatment: You came in with dizziness and ear pain. We obtained a CAT scan of your brain which was concerning for in infection on the outer part of your ear. You were seen by the Ears, Nose, and Throat doctor for this. We put you on antibiotics. We got an MRI which showed __________.  PLEASE RETURN TO THE EMERGENCY ROOM IF YOU EXPERIENCE ANY OF THE FOLLOWING: Severe headaches, shortness of breath, severe eye pain, chest pain, abdominal pain, muscle pain, or any other symptoms concerning to you. Please follow up with your outpatient PCP, neurologist, ophthalmologist, and ENT.      SECONDARY DISCHARGE DIAGNOSES  Diagnosis: Dizziness  Assessment and Plan of Treatment: You came in with dizziness and headaches. You were seen by the neurologist who believed that you may have an issue with your nerves that affect your balance and sense of position. We got an MRI to rule out stroke. It showed ____________.     PRINCIPAL DISCHARGE DIAGNOSIS  Diagnosis: Malignant otitis externa of left ear  Assessment and Plan of Treatment: You came in with dizziness and ear pain. We obtained a CAT scan of your brain which was concerning for in infection on the outer part of your ear. You were seen by the Ears, Nose, and Throat doctor for this. We put you on antibiotics. We got an MRI which showed no infectious focus in the bone of your skull and no concern for acute stroke.  PLEASE RETURN TO THE EMERGENCY ROOM IF YOU EXPERIENCE ANY OF THE FOLLOWING: Severe headaches, shortness of breath, severe eye pain, chest pain, abdominal pain, muscle pain, or any other symptoms concerning to you. Please follow up with your outpatient PCP, neurologist, ophthalmologist, and ENT.      SECONDARY DISCHARGE DIAGNOSES  Diagnosis: Dizziness  Assessment and Plan of Treatment: You came in with dizziness and headaches. You were seen by the neurologist who believed that you may have an issue with your nerves that affect your balance and sense of position. We got an MRI to rule out stroke. It showed no acute stroke, but redemonstrated outer ear infection.     PRINCIPAL DISCHARGE DIAGNOSIS  Diagnosis: Malignant otitis externa of left ear  Assessment and Plan of Treatment: You came in with dizziness and ear pain. We obtained a CAT scan of your brain which was concerning for in infection on the outer part of your ear. You were seen by the Ears, Nose, and Throat doctor for this. We put you on antibiotics. We got an MRI which showed no infectious focus in the bone of your skull and no concern for acute stroke.  PLEASE RETURN TO THE EMERGENCY ROOM IF YOU EXPERIENCE ANY OF THE FOLLOWING: Severe headaches, shortness of breath, severe eye pain, chest pain, abdominal pain, muscle pain, or any other symptoms concerning to you. Please follow up with your outpatient PCP, neurologist, ophthalmologist, and ENT.      SECONDARY DISCHARGE DIAGNOSES  Diagnosis: Dizziness  Assessment and Plan of Treatment: You came in with dizziness and headaches. You were seen by the neurologist who believed that you may have an issue with your nerves that affect your balance and sense of position. We got an MRI to rule out stroke. It showed no acute stroke, but redemonstrated outer ear infection. You were given meclizine with some improvement in your dizziness.    Diagnosis: Bilateral conjunctivitis  Assessment and Plan of Treatment: You came in with redness of your eyes. You were seen by the eye doctors who believed you may have an infection of your eyes. We gave you antibacterial eye drops. The eye doctors were concerned about the elevated pressure in your left eye. They made an appointment for you to see them on Monday (1/3) at 1230pm. Please speak with your family and rehab about coordinating ride to appointment.    Diagnosis: Pill dysphagia  Assessment and Plan of Treatment: You reported a history of pills getting caught in your throat. You underwent tests to evaluate your esophagus. They were abnormal. Please follow-up with the GI doctors as outpatient for further investigation including endoscopy and possible pressure measurements of the esophagus. We recommend you take your pills crushed with applesauce. Please complete the course of antifungal medications in case there is a fungal infection of your esophagus.

## 2021-12-18 NOTE — PROGRESS NOTE ADULT - PROBLEM SELECTOR PLAN 1
- pt reports dizziness with rest and worse with ambulating  - dizziness possibly 2/2 inner ear etiology vs stroke  - Neuro following  - check EKG  - Diet NPO pending dysphagia screen  - it pt passes dysphagia screen can start Pureed diet and resume home meds  - continue asa/statin   - neuro checks q 4 hours  - aspiration/fall/safety precautions  - trial meclizine prn   - TTE w bubble study  - MRI head   - OT/PT c/s - pt reports left ear pain and swelling x 1 month  - pt is s/p PO bactrim outpatient   - CT concerning for necrotizing otitis externa  - pt is s/p IV Cipro and IV clinda in ER  - ENT following, rec Ciprofloxacin for chondritis to cover for pseudomonas  - Will continue IV Cipro    - F/U MRI head w/wo contrast

## 2021-12-18 NOTE — PHYSICAL THERAPY INITIAL EVALUATION ADULT - ADDITIONAL COMMENTS
(+) Ramp access at home to enter building     Pt. left comfortable in bed with all tubes/lines intact, call bell in reach and in NAD.

## 2021-12-18 NOTE — PROGRESS NOTE ADULT - ASSESSMENT
70 yo M PMHx of COPD, Asthma, ?atrial fibrillation per chart (not on AC), Ulcerative Colitis, DM (no longer on insulin) and HTN presenting with complaint of left ear pain x 1 month presenting with inability to ambulate 2/2 dizziness described as room spinning sensation, 9/10 frontal headache, and bilateral eye erythema. CT IAC concerning for necrotizing otitis externa 70 yo M PMHx of COPD, Asthma, ?atrial fibrillation per chart (not on AC), Ulcerative Colitis, DM (no longer on insulin) and HTN presenting with complaint of left ear pain x 1 month presenting with inability to ambulate 2/2 dizziness described as room spinning sensation, 9/10 frontal headache, and bilateral eye erythema. CT IAC concerning for necrotizing otitis externa. Started on cipro.

## 2021-12-18 NOTE — OCCUPATIONAL THERAPY INITIAL EVALUATION ADULT - PERTINENT HX OF CURRENT PROBLEM, REHAB EVAL
71 year old male with history of COPD, Asthma, Ulcerative Colitis, DM and HTN presenting with complaint of left ear pain x 1 month presenting with inability to ambulate due dizziness, frontal headache, and bilateral eye erythema. CT IAC concerning for necrotizing otitis externa. Admitted for further management and to r/o CVA.

## 2021-12-18 NOTE — CONSULT NOTE ADULT - ASSESSMENT
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 Ophthalmology consulted for eye redness OU    # eye redness both eyes  - possibly bacterial conjunctivitis given ear infection. Scant discharge seen medially right eye. Also possible component of dry eyes given ectropion right >left  - start ofloxacin gtts QID both eyes  - start erythromycin ointment QHS both eyes    # glaucoma suspect vs. ocular HTN  - follows with Dr. Anil Crystal, last HVF reportedly normal per pt  - continue xalatan QHS both eyes    Discussed with Dr. Garza, chief.     Luli Flannery MD PGY-2  [phone]  Also available on [employer] Teams    Outpatient follow-up: Patient should follow-up with his/her ophthalmologist or with [facility] Department of Ophthalmology at the address below     [address] [place] [address]. [address]  [place] [address]  [phone]

## 2021-12-18 NOTE — DISCHARGE NOTE PROVIDER - PROVIDER TOKENS
PROVIDER:[TOKEN:[[id number]],FOLLOWUP:[1 week]] PROVIDER:[TOKEN:[[id number]],FOLLOWUP:[1 week]],PROVIDER:[TOKEN:[[id number]]] PROVIDER:[TOKEN:[[id number]],FOLLOWUP:[1 week]],PROVIDER:[TOKEN:[[id number]]],PROVIDER:[TOKEN:[[id number]]]

## 2021-12-19 LAB
ALBUMIN SERPL ELPH-MCNC: 2.9 G/DL — LOW (ref 3.3–5)
ALP SERPL-CCNC: 103 U/L — SIGNIFICANT CHANGE UP (ref 40–120)
ALT FLD-CCNC: 15 U/L — SIGNIFICANT CHANGE UP (ref 4–41)
ANION GAP SERPL CALC-SCNC: 10 MMOL/L — SIGNIFICANT CHANGE UP (ref 7–14)
AST SERPL-CCNC: 20 U/L — SIGNIFICANT CHANGE UP (ref 4–40)
BASOPHILS # BLD AUTO: 0.01 K/UL — SIGNIFICANT CHANGE UP (ref 0–0.2)
BASOPHILS NFR BLD AUTO: 0.3 % — SIGNIFICANT CHANGE UP (ref 0–2)
BILIRUB SERPL-MCNC: 0.4 MG/DL — SIGNIFICANT CHANGE UP (ref 0.2–1.2)
BUN SERPL-MCNC: 12 MG/DL — SIGNIFICANT CHANGE UP (ref 7–23)
CALCIUM SERPL-MCNC: 8.7 MG/DL — SIGNIFICANT CHANGE UP (ref 8.4–10.5)
CHLORIDE SERPL-SCNC: 100 MMOL/L — SIGNIFICANT CHANGE UP (ref 98–107)
CO2 SERPL-SCNC: 25 MMOL/L — SIGNIFICANT CHANGE UP (ref 22–31)
CREAT SERPL-MCNC: 0.97 MG/DL — SIGNIFICANT CHANGE UP (ref 0.5–1.3)
EOSINOPHIL # BLD AUTO: 0.18 K/UL — SIGNIFICANT CHANGE UP (ref 0–0.5)
EOSINOPHIL NFR BLD AUTO: 5.2 % — SIGNIFICANT CHANGE UP (ref 0–6)
GLUCOSE BLDC GLUCOMTR-MCNC: 106 MG/DL — HIGH (ref 70–99)
GLUCOSE BLDC GLUCOMTR-MCNC: 119 MG/DL — HIGH (ref 70–99)
GLUCOSE BLDC GLUCOMTR-MCNC: 120 MG/DL — HIGH (ref 70–99)
GLUCOSE BLDC GLUCOMTR-MCNC: 138 MG/DL — HIGH (ref 70–99)
GLUCOSE SERPL-MCNC: 90 MG/DL — SIGNIFICANT CHANGE UP (ref 70–99)
HCT VFR BLD CALC: 34.8 % — LOW (ref 39–50)
HGB BLD-MCNC: 9.7 G/DL — LOW (ref 13–17)
IANC: 1.75 K/UL — SIGNIFICANT CHANGE UP (ref 1.5–8.5)
IMM GRANULOCYTES NFR BLD AUTO: 0.3 % — SIGNIFICANT CHANGE UP (ref 0–1.5)
LYMPHOCYTES # BLD AUTO: 0.97 K/UL — LOW (ref 1–3.3)
LYMPHOCYTES # BLD AUTO: 28 % — SIGNIFICANT CHANGE UP (ref 13–44)
MAGNESIUM SERPL-MCNC: 1.9 MG/DL — SIGNIFICANT CHANGE UP (ref 1.6–2.6)
MCHC RBC-ENTMCNC: 20.3 PG — LOW (ref 27–34)
MCHC RBC-ENTMCNC: 27.9 GM/DL — LOW (ref 32–36)
MCV RBC AUTO: 72.7 FL — LOW (ref 80–100)
MONOCYTES # BLD AUTO: 0.55 K/UL — SIGNIFICANT CHANGE UP (ref 0–0.9)
MONOCYTES NFR BLD AUTO: 15.9 % — HIGH (ref 2–14)
NEUTROPHILS # BLD AUTO: 1.75 K/UL — LOW (ref 1.8–7.4)
NEUTROPHILS NFR BLD AUTO: 50.3 % — SIGNIFICANT CHANGE UP (ref 43–77)
NRBC # BLD: 0 /100 WBCS — SIGNIFICANT CHANGE UP
NRBC # FLD: 0 K/UL — SIGNIFICANT CHANGE UP
PHOSPHATE SERPL-MCNC: 3 MG/DL — SIGNIFICANT CHANGE UP (ref 2.5–4.5)
PLATELET # BLD AUTO: 538 K/UL — HIGH (ref 150–400)
POTASSIUM SERPL-MCNC: 4.2 MMOL/L — SIGNIFICANT CHANGE UP (ref 3.5–5.3)
POTASSIUM SERPL-SCNC: 4.2 MMOL/L — SIGNIFICANT CHANGE UP (ref 3.5–5.3)
PROT SERPL-MCNC: 7.9 G/DL — SIGNIFICANT CHANGE UP (ref 6–8.3)
RBC # BLD: 4.79 M/UL — SIGNIFICANT CHANGE UP (ref 4.2–5.8)
RBC # FLD: 20.1 % — HIGH (ref 10.3–14.5)
SODIUM SERPL-SCNC: 135 MMOL/L — SIGNIFICANT CHANGE UP (ref 135–145)
WBC # BLD: 3.47 K/UL — LOW (ref 3.8–10.5)
WBC # FLD AUTO: 3.47 K/UL — LOW (ref 3.8–10.5)

## 2021-12-19 PROCEDURE — 99233 SBSQ HOSP IP/OBS HIGH 50: CPT | Mod: GC

## 2021-12-19 RX ORDER — MECLIZINE HCL 12.5 MG
25 TABLET ORAL DAILY
Refills: 0 | Status: DISCONTINUED | OUTPATIENT
Start: 2021-12-19 | End: 2021-12-22

## 2021-12-19 RX ADMIN — ENOXAPARIN SODIUM 40 MILLIGRAM(S): 100 INJECTION SUBCUTANEOUS at 11:35

## 2021-12-19 RX ADMIN — Medication 81 MILLIGRAM(S): at 11:40

## 2021-12-19 RX ADMIN — MONTELUKAST 10 MILLIGRAM(S): 4 TABLET, CHEWABLE ORAL at 22:03

## 2021-12-19 RX ADMIN — Medication 200 MILLIGRAM(S): at 18:32

## 2021-12-19 RX ADMIN — Medication 200 MILLIGRAM(S): at 06:01

## 2021-12-19 RX ADMIN — Medication 1 DROP(S): at 11:43

## 2021-12-19 RX ADMIN — ATORVASTATIN CALCIUM 40 MILLIGRAM(S): 80 TABLET, FILM COATED ORAL at 22:04

## 2021-12-19 RX ADMIN — CARVEDILOL PHOSPHATE 12.5 MILLIGRAM(S): 80 CAPSULE, EXTENDED RELEASE ORAL at 06:02

## 2021-12-19 RX ADMIN — LOSARTAN POTASSIUM 100 MILLIGRAM(S): 100 TABLET, FILM COATED ORAL at 06:02

## 2021-12-19 RX ADMIN — Medication 1 DROP(S): at 06:02

## 2021-12-19 RX ADMIN — Medication 25 MILLIGRAM(S): at 11:47

## 2021-12-19 RX ADMIN — CARVEDILOL PHOSPHATE 12.5 MILLIGRAM(S): 80 CAPSULE, EXTENDED RELEASE ORAL at 18:33

## 2021-12-19 RX ADMIN — Medication 1 TABLET(S): at 06:02

## 2021-12-19 RX ADMIN — Medication 1 TABLET(S): at 11:38

## 2021-12-19 RX ADMIN — Medication 1 APPLICATION(S): at 11:44

## 2021-12-19 RX ADMIN — LATANOPROST 1 DROP(S): 0.05 SOLUTION/ DROPS OPHTHALMIC; TOPICAL at 22:00

## 2021-12-19 RX ADMIN — Medication 1 TABLET(S): at 18:39

## 2021-12-19 RX ADMIN — Medication 1 DROP(S): at 18:34

## 2021-12-19 RX ADMIN — Medication 1 DROP(S): at 18:32

## 2021-12-19 RX ADMIN — BUDESONIDE AND FORMOTEROL FUMARATE DIHYDRATE 2 PUFF(S): 160; 4.5 AEROSOL RESPIRATORY (INHALATION) at 21:59

## 2021-12-19 RX ADMIN — Medication 1 DROP(S): at 00:33

## 2021-12-19 RX ADMIN — BUDESONIDE AND FORMOTEROL FUMARATE DIHYDRATE 2 PUFF(S): 160; 4.5 AEROSOL RESPIRATORY (INHALATION) at 09:39

## 2021-12-19 RX ADMIN — Medication 1 DROP(S): at 11:39

## 2021-12-19 RX ADMIN — Medication 1 DROP(S): at 06:01

## 2021-12-19 NOTE — PROGRESS NOTE ADULT - SUBJECTIVE AND OBJECTIVE BOX
DATE OF SERVICE: 12-19-21 @ 07:35    Patient is a 71y old  Male who presents with a chief complaint of dizziness, ear pain (18 Dec 2021 15:57)      SUBJECTIVE / OVERNIGHT EVENTS: NAEO.    MEDICATIONS  (STANDING):  artificial tears (preservative free) Ophthalmic Solution 1 Drop(s) Both EYES every 6 hours  aspirin enteric coated 81 milliGRAM(s) Oral daily  atorvastatin 40 milliGRAM(s) Oral at bedtime  budesonide 160 MICROgram(s)/formoterol 4.5 MICROgram(s) Inhaler 2 Puff(s) Inhalation two times a day  carvedilol 12.5 milliGRAM(s) Oral every 12 hours  ciprofloxacin   IVPB 400 milliGRAM(s) IV Intermittent every 12 hours  dextrose 40% Gel 15 Gram(s) Oral once  dextrose 5%. 1000 milliLiter(s) (50 mL/Hr) IV Continuous <Continuous>  dextrose 5%. 1000 milliLiter(s) (100 mL/Hr) IV Continuous <Continuous>  dextrose 50% Injectable 25 Gram(s) IV Push once  dextrose 50% Injectable 12.5 Gram(s) IV Push once  dextrose 50% Injectable 25 Gram(s) IV Push once  diphenoxylate/atropine 1 Tablet(s) Oral two times a day  enoxaparin Injectable 40 milliGRAM(s) SubCutaneous daily  erythromycin   Ointment 1 Application(s) Both EYES daily  glucagon  Injectable 1 milliGRAM(s) IntraMuscular once  insulin lispro (ADMELOG) corrective regimen sliding scale   SubCutaneous three times a day before meals  latanoprost 0.005% Ophthalmic Solution 1 Drop(s) Both EYES at bedtime  losartan 100 milliGRAM(s) Oral daily  montelukast 10 milliGRAM(s) Oral at bedtime  multivitamin 1 Tablet(s) Oral daily  ofloxacin 0.3% Solution 1 Drop(s) Both EYES four times a day    MEDICATIONS  (PRN):  acetaminophen     Tablet .. 650 milliGRAM(s) Oral every 6 hours PRN Mild Pain (1 - 3), Moderate Pain (4 - 6)  ALBUTerol    90 MICROgram(s) HFA Inhaler 2 Puff(s) Inhalation every 6 hours PRN Shortness of Breath and/or Wheezing      Vital Signs Last 24 Hrs  T(C): 36.7 (19 Dec 2021 05:30), Max: 36.9 (18 Dec 2021 21:30)  T(F): 98.1 (19 Dec 2021 05:30), Max: 98.4 (18 Dec 2021 21:30)  HR: 73 (19 Dec 2021 05:30) (65 - 108)  BP: 122/71 (19 Dec 2021 05:30) (116/70 - 148/80)  BP(mean): --  RR: 17 (19 Dec 2021 05:30) (17 - 18)  SpO2: 99% (19 Dec 2021 05:30) (98% - 100%)  CAPILLARY BLOOD GLUCOSE      POCT Blood Glucose.: 176 mg/dL (18 Dec 2021 21:44)  POCT Blood Glucose.: 109 mg/dL (18 Dec 2021 17:46)  POCT Blood Glucose.: 116 mg/dL (18 Dec 2021 12:27)  POCT Blood Glucose.: 86 mg/dL (18 Dec 2021 09:13)    I&O's Summary      PHYSICAL EXAM:  GENERAL: NAD, well-developed  HEAD:  Atraumatic, Normocephalic  EYES: EOMI, PERRLA, conjunctiva and sclera clear  NECK: Supple, No JVD  CHEST/LUNG: Clear to auscultation bilaterally; No wheeze  HEART: Regular rate and rhythm; No murmurs, rubs, or gallops  ABDOMEN: Soft, Nontender, Nondistended; Bowel sounds present  EXTREMITIES:  2+ Peripheral Pulses, No clubbing, cyanosis, or edema  PSYCH: AAOx3  NEUROLOGY: non-focal  SKIN: No rashes or lesions    LABS:                        10.3   4.80  )-----------( 565      ( 18 Dec 2021 09:17 )             35.1     12-18    132<L>  |  103  |  8   ----------------------------<  78  3.9   |  22  |  0.93    Ca    8.6      18 Dec 2021 09:17  Phos  2.7     12-18  Mg     1.80     12-18    TPro  7.8  /  Alb  2.5<L>  /  TBili  0.3  /  DBili  x   /  AST  18  /  ALT  14  /  AlkPhos  108  12-18              RADIOLOGY & ADDITIONAL TESTS:    Imaging Personally Reviewed:    Consultant(s) Notes Reviewed:      Care Discussed with Consultants/Other Providers:   DATE OF SERVICE: 12-19-21 @ 07:35    Patient is a 71y old  Male who presents with a chief complaint of dizziness, ear pain (18 Dec 2021 15:57)      SUBJECTIVE / OVERNIGHT EVENTS: NAEO. No events on tele    MEDICATIONS  (STANDING):  artificial tears (preservative free) Ophthalmic Solution 1 Drop(s) Both EYES every 6 hours  aspirin enteric coated 81 milliGRAM(s) Oral daily  atorvastatin 40 milliGRAM(s) Oral at bedtime  budesonide 160 MICROgram(s)/formoterol 4.5 MICROgram(s) Inhaler 2 Puff(s) Inhalation two times a day  carvedilol 12.5 milliGRAM(s) Oral every 12 hours  ciprofloxacin   IVPB 400 milliGRAM(s) IV Intermittent every 12 hours  dextrose 40% Gel 15 Gram(s) Oral once  dextrose 5%. 1000 milliLiter(s) (50 mL/Hr) IV Continuous <Continuous>  dextrose 5%. 1000 milliLiter(s) (100 mL/Hr) IV Continuous <Continuous>  dextrose 50% Injectable 25 Gram(s) IV Push once  dextrose 50% Injectable 12.5 Gram(s) IV Push once  dextrose 50% Injectable 25 Gram(s) IV Push once  diphenoxylate/atropine 1 Tablet(s) Oral two times a day  enoxaparin Injectable 40 milliGRAM(s) SubCutaneous daily  erythromycin   Ointment 1 Application(s) Both EYES daily  glucagon  Injectable 1 milliGRAM(s) IntraMuscular once  insulin lispro (ADMELOG) corrective regimen sliding scale   SubCutaneous three times a day before meals  latanoprost 0.005% Ophthalmic Solution 1 Drop(s) Both EYES at bedtime  losartan 100 milliGRAM(s) Oral daily  montelukast 10 milliGRAM(s) Oral at bedtime  multivitamin 1 Tablet(s) Oral daily  ofloxacin 0.3% Solution 1 Drop(s) Both EYES four times a day    MEDICATIONS  (PRN):  acetaminophen     Tablet .. 650 milliGRAM(s) Oral every 6 hours PRN Mild Pain (1 - 3), Moderate Pain (4 - 6)  ALBUTerol    90 MICROgram(s) HFA Inhaler 2 Puff(s) Inhalation every 6 hours PRN Shortness of Breath and/or Wheezing      Vital Signs Last 24 Hrs  T(C): 36.7 (19 Dec 2021 05:30), Max: 36.9 (18 Dec 2021 21:30)  T(F): 98.1 (19 Dec 2021 05:30), Max: 98.4 (18 Dec 2021 21:30)  HR: 73 (19 Dec 2021 05:30) (65 - 108)  BP: 122/71 (19 Dec 2021 05:30) (116/70 - 148/80)  BP(mean): --  RR: 17 (19 Dec 2021 05:30) (17 - 18)  SpO2: 99% (19 Dec 2021 05:30) (98% - 100%)  CAPILLARY BLOOD GLUCOSE      POCT Blood Glucose.: 176 mg/dL (18 Dec 2021 21:44)  POCT Blood Glucose.: 109 mg/dL (18 Dec 2021 17:46)  POCT Blood Glucose.: 116 mg/dL (18 Dec 2021 12:27)  POCT Blood Glucose.: 86 mg/dL (18 Dec 2021 09:13)    I&O's Summary      PHYSICAL EXAM:  GENERAL: NAD, well-developed  HEAD:  Atraumatic, Normocephalic  EYES: EOMI, PERRLA, conjunctiva and sclera clear  NECK: Supple, No JVD  CHEST/LUNG: Clear to auscultation bilaterally; No wheeze  HEART: Regular rate and rhythm; No murmurs, rubs, or gallops  ABDOMEN: Soft, Nontender, Nondistended; Bowel sounds present  EXTREMITIES:  2+ Peripheral Pulses, No clubbing, cyanosis, or edema  PSYCH: AAOx3  NEUROLOGY: non-focal  SKIN: No rashes or lesions    LABS:                        10.3   4.80  )-----------( 565      ( 18 Dec 2021 09:17 )             35.1     12-18    132<L>  |  103  |  8   ----------------------------<  78  3.9   |  22  |  0.93    Ca    8.6      18 Dec 2021 09:17  Phos  2.7     12-18  Mg     1.80     12-18    TPro  7.8  /  Alb  2.5<L>  /  TBili  0.3  /  DBili  x   /  AST  18  /  ALT  14  /  AlkPhos  108  12-18              RADIOLOGY & ADDITIONAL TESTS:    Imaging Personally Reviewed:    Consultant(s) Notes Reviewed:      Care Discussed with Consultants/Other Providers:   DATE OF SERVICE: 12-19-21 @ 07:35    Patient is a 71y old  Male who presents with a chief complaint of dizziness, ear pain (18 Dec 2021 15:57)      SUBJECTIVE / OVERNIGHT EVENTS: NAEO. No events on tele. Patient reports that he feels well, still has dizziness and ear pain but is improved from initial presentation. Pain still complains of eye redness but denies discharge. ROS otherwise negative    MEDICATIONS  (STANDING):  artificial tears (preservative free) Ophthalmic Solution 1 Drop(s) Both EYES every 6 hours  aspirin enteric coated 81 milliGRAM(s) Oral daily  atorvastatin 40 milliGRAM(s) Oral at bedtime  budesonide 160 MICROgram(s)/formoterol 4.5 MICROgram(s) Inhaler 2 Puff(s) Inhalation two times a day  carvedilol 12.5 milliGRAM(s) Oral every 12 hours  ciprofloxacin   IVPB 400 milliGRAM(s) IV Intermittent every 12 hours  dextrose 40% Gel 15 Gram(s) Oral once  dextrose 5%. 1000 milliLiter(s) (50 mL/Hr) IV Continuous <Continuous>  dextrose 5%. 1000 milliLiter(s) (100 mL/Hr) IV Continuous <Continuous>  dextrose 50% Injectable 25 Gram(s) IV Push once  dextrose 50% Injectable 12.5 Gram(s) IV Push once  dextrose 50% Injectable 25 Gram(s) IV Push once  diphenoxylate/atropine 1 Tablet(s) Oral two times a day  enoxaparin Injectable 40 milliGRAM(s) SubCutaneous daily  erythromycin   Ointment 1 Application(s) Both EYES daily  glucagon  Injectable 1 milliGRAM(s) IntraMuscular once  insulin lispro (ADMELOG) corrective regimen sliding scale   SubCutaneous three times a day before meals  latanoprost 0.005% Ophthalmic Solution 1 Drop(s) Both EYES at bedtime  losartan 100 milliGRAM(s) Oral daily  montelukast 10 milliGRAM(s) Oral at bedtime  multivitamin 1 Tablet(s) Oral daily  ofloxacin 0.3% Solution 1 Drop(s) Both EYES four times a day    MEDICATIONS  (PRN):  acetaminophen     Tablet .. 650 milliGRAM(s) Oral every 6 hours PRN Mild Pain (1 - 3), Moderate Pain (4 - 6)  ALBUTerol    90 MICROgram(s) HFA Inhaler 2 Puff(s) Inhalation every 6 hours PRN Shortness of Breath and/or Wheezing      Vital Signs Last 24 Hrs  T(C): 36.7 (19 Dec 2021 05:30), Max: 36.9 (18 Dec 2021 21:30)  T(F): 98.1 (19 Dec 2021 05:30), Max: 98.4 (18 Dec 2021 21:30)  HR: 73 (19 Dec 2021 05:30) (65 - 108)  BP: 122/71 (19 Dec 2021 05:30) (116/70 - 148/80)  BP(mean): --  RR: 17 (19 Dec 2021 05:30) (17 - 18)  SpO2: 99% (19 Dec 2021 05:30) (98% - 100%)  CAPILLARY BLOOD GLUCOSE      POCT Blood Glucose.: 176 mg/dL (18 Dec 2021 21:44)  POCT Blood Glucose.: 109 mg/dL (18 Dec 2021 17:46)  POCT Blood Glucose.: 116 mg/dL (18 Dec 2021 12:27)  POCT Blood Glucose.: 86 mg/dL (18 Dec 2021 09:13)    I&O's Summary      PHYSICAL EXAM:  GENERAL: NAD, well-developed  HEAD:  Atraumatic, Normocephalic  EYES: no pain with extraocular eye movements. PERRLA, +erythema of bilateral conjunctiva  EARS: +TTP of left ear. Increased erythema and edema of cartilage of left ear  NECK: Supple, No JVD, no lymphadenopathy, thyroid normal to palpation  CHEST/LUNG: Clear to auscultation bilaterally; No wheeze  HEART: Regular rate and rhythm; No murmurs, rubs, or gallops  ABDOMEN: Soft, Nontender, Nondistended; Bowel sounds present  EXTREMITIES:  2+ Peripheral Pulses, No clubbing, cyanosis, or edema  PSYCH: AAOx3  NEUROLOGY: CN 2-7 intact, 9-12 intact. Decreased hearing in bilateral ears. 5/5 strength in UE, LE bilaterally. Sensations equal and intact throughout. Normal finger to nose, heel to shin test.  SKIN: No rashes or lesions    LABS:                        10.3   4.80  )-----------( 565      ( 18 Dec 2021 09:17 )             35.1     12-18    132<L>  |  103  |  8   ----------------------------<  78  3.9   |  22  |  0.93    Ca    8.6      18 Dec 2021 09:17  Phos  2.7     12-18  Mg     1.80     12-18    TPro  7.8  /  Alb  2.5<L>  /  TBili  0.3  /  DBili  x   /  AST  18  /  ALT  14  /  AlkPhos  108  12-18              RADIOLOGY & ADDITIONAL TESTS:    Imaging Personally Reviewed:    Consultant(s) Notes Reviewed:      Care Discussed with Consultants/Other Providers:

## 2021-12-19 NOTE — PROGRESS NOTE ADULT - PROBLEM SELECTOR PLAN 4
- pt with bilateral conjunctivitis   - no purulent eye discharge   - CT showing orbital proptosis - TSH wnl  - will continue latanoprost eye drops  - erythromycin + Ofloxacin per Optho

## 2021-12-19 NOTE — PROGRESS NOTE ADULT - ASSESSMENT
72 yo M PMHx of COPD, Asthma, ?atrial fibrillation per chart (not on AC), Ulcerative Colitis, DM (no longer on insulin) and HTN presenting with complaint of left ear pain x 1 month presenting with inability to ambulate 2/2 dizziness described as room spinning sensation, 9/10 frontal headache, and bilateral eye erythema. CT IAC concerning for necrotizing otitis externa. Started on cipro.  72 yo M PMHx of COPD, Asthma, ?atrial fibrillation per chart (not on AC), Ulcerative Colitis, DM (no longer on insulin) and HTN presenting with complaint of left ear pain x 1 month presenting with inability to ambulate 2/2 dizziness described as room spinning sensation, 9/10 frontal headache, and bilateral eye erythema. CT IAC concerning for necrotizing otitis externa. Started on cipro. Pending MRI to r/o stroke, though dizziness is likely peripheral in etiology.

## 2021-12-19 NOTE — PROGRESS NOTE ADULT - PROBLEM SELECTOR PLAN 10
DVT ppx: Lovenox SQ  Diet: Carb consistent  Dispo: Pending clinical improvement  Dispo: PT rec rehab

## 2021-12-19 NOTE — PROGRESS NOTE ADULT - ATTENDING COMMENTS
72 yo M PMHx of COPD, Asthma, ?atrial fibrillation per chart (not on AC), Ulcerative Colitis, DM (no longer on insulin) and HTN admitted for necrotizing otitis media noted on CT scan. Additionally Pt noted to have dizziness described as room spinning and thus is also admitted for r/o CVA/TIA.    No complaints today. Reports dizziness has improved significantly on meclizine. Complaining of right ear fullness. Still has b/l injected conjuntiva, +photophobia with light, no change in visual acuity. Significant tenderness on palpation of left tragus. right ear WNL.    #necrotizing otitis media: c/w ciprofloxacin for pseudomonal coverage. would like ENT to re-see patient tomorrow, assess duration of treatment    #dizziness: seems peripheral. Agree with CVA workup, MRI pending, appreciate neuro recs.    #conjunctivitis b/l: seems to be no sign of uveitis. possible infection vs glaucoma vs ocular HTN. appreciate optho input.    Remainder of plan as stated above. Plan discussed with HS.    Lory Jeong MD  Hospitalist

## 2021-12-19 NOTE — PROGRESS NOTE ADULT - PROBLEM SELECTOR PLAN 2
- pt reports dizziness with rest and worse with ambulating  - dizziness possibly 2/2 inner ear etiology vs stroke  - Neuro following  - continue asa/statin   - neuro checks q 4 hours  - aspiration/fall/safety precautions  - trial meclizine 25  - f/u TTE, MRI head/MRI Internal Auditory Canal   - OT/PT c/s  - unclear history of afib. Patient denies. Will f/u with outpatient PCP - pt reports dizziness with rest and worse with ambulating  - dizziness possibly 2/2 inner ear etiology vs stroke  - Neuro following  - continue asa/statin   - neuro checks q 4 hours  - aspiration/fall/safety precautions  - trial meclizine 25  - f/u TTE, MRI head/MRI Internal Auditory Canal to r/o stroke  - unclear history of afib. Patient denies. Will f/u with outpatient PCP. No events on tele - pt reports dizziness with rest and worse with ambulating  - dizziness possibly 2/2 inner ear etiology vs stroke  - Neuro following  - continue asa/statin   - neuro checks q 4 hours  - aspiration/fall/safety precautions  - c/w meclizine 25mg daily as patient reports improvement with this  - f/u TTE, MRI head/MRI Internal Auditory Canal to r/o stroke  - unclear history of afib. Patient denies. Will f/u with outpatient PCP. No events on tele

## 2021-12-20 LAB
ANION GAP SERPL CALC-SCNC: 15 MMOL/L — HIGH (ref 7–14)
BASOPHILS # BLD AUTO: 0.01 K/UL — SIGNIFICANT CHANGE UP (ref 0–0.2)
BASOPHILS NFR BLD AUTO: 0.3 % — SIGNIFICANT CHANGE UP (ref 0–2)
BUN SERPL-MCNC: 12 MG/DL — SIGNIFICANT CHANGE UP (ref 7–23)
CALCIUM SERPL-MCNC: 8.5 MG/DL — SIGNIFICANT CHANGE UP (ref 8.4–10.5)
CHLORIDE SERPL-SCNC: 98 MMOL/L — SIGNIFICANT CHANGE UP (ref 98–107)
CO2 SERPL-SCNC: 20 MMOL/L — LOW (ref 22–31)
CREAT SERPL-MCNC: 1.01 MG/DL — SIGNIFICANT CHANGE UP (ref 0.5–1.3)
EOSINOPHIL # BLD AUTO: 0.27 K/UL — SIGNIFICANT CHANGE UP (ref 0–0.5)
EOSINOPHIL NFR BLD AUTO: 6.8 % — HIGH (ref 0–6)
FERRITIN SERPL-MCNC: 318 NG/ML — SIGNIFICANT CHANGE UP (ref 30–400)
GLUCOSE BLDC GLUCOMTR-MCNC: 123 MG/DL — HIGH (ref 70–99)
GLUCOSE BLDC GLUCOMTR-MCNC: 124 MG/DL — HIGH (ref 70–99)
GLUCOSE BLDC GLUCOMTR-MCNC: 125 MG/DL — HIGH (ref 70–99)
GLUCOSE BLDC GLUCOMTR-MCNC: 127 MG/DL — HIGH (ref 70–99)
GLUCOSE SERPL-MCNC: 106 MG/DL — HIGH (ref 70–99)
HCT VFR BLD CALC: 36.1 % — LOW (ref 39–50)
HGB BLD-MCNC: 10.4 G/DL — LOW (ref 13–17)
IANC: 2.04 K/UL — SIGNIFICANT CHANGE UP (ref 1.5–8.5)
IMM GRANULOCYTES NFR BLD AUTO: 0.5 % — SIGNIFICANT CHANGE UP (ref 0–1.5)
IRON SATN MFR SERPL: 10 % — LOW (ref 14–50)
IRON SATN MFR SERPL: 21 UG/DL — LOW (ref 45–165)
LYMPHOCYTES # BLD AUTO: 1.06 K/UL — SIGNIFICANT CHANGE UP (ref 1–3.3)
LYMPHOCYTES # BLD AUTO: 26.8 % — SIGNIFICANT CHANGE UP (ref 13–44)
MAGNESIUM SERPL-MCNC: 1.8 MG/DL — SIGNIFICANT CHANGE UP (ref 1.6–2.6)
MCHC RBC-ENTMCNC: 20.3 PG — LOW (ref 27–34)
MCHC RBC-ENTMCNC: 28.8 GM/DL — LOW (ref 32–36)
MCV RBC AUTO: 70.5 FL — LOW (ref 80–100)
MONOCYTES # BLD AUTO: 0.55 K/UL — SIGNIFICANT CHANGE UP (ref 0–0.9)
MONOCYTES NFR BLD AUTO: 13.9 % — SIGNIFICANT CHANGE UP (ref 2–14)
NEUTROPHILS # BLD AUTO: 2.04 K/UL — SIGNIFICANT CHANGE UP (ref 1.8–7.4)
NEUTROPHILS NFR BLD AUTO: 51.7 % — SIGNIFICANT CHANGE UP (ref 43–77)
NRBC # BLD: 0 /100 WBCS — SIGNIFICANT CHANGE UP
NRBC # FLD: 0 K/UL — SIGNIFICANT CHANGE UP
PHOSPHATE SERPL-MCNC: 2.8 MG/DL — SIGNIFICANT CHANGE UP (ref 2.5–4.5)
PLATELET # BLD AUTO: 528 K/UL — HIGH (ref 150–400)
POTASSIUM SERPL-MCNC: 4.4 MMOL/L — SIGNIFICANT CHANGE UP (ref 3.5–5.3)
POTASSIUM SERPL-SCNC: 4.4 MMOL/L — SIGNIFICANT CHANGE UP (ref 3.5–5.3)
RBC # BLD: 5.12 M/UL — SIGNIFICANT CHANGE UP (ref 4.2–5.8)
RBC # FLD: 20.6 % — HIGH (ref 10.3–14.5)
SODIUM SERPL-SCNC: 133 MMOL/L — LOW (ref 135–145)
TIBC SERPL-MCNC: 205 UG/DL — LOW (ref 220–430)
UIBC SERPL-MCNC: 184 UG/DL — SIGNIFICANT CHANGE UP (ref 110–370)
WBC # BLD: 3.95 K/UL — SIGNIFICANT CHANGE UP (ref 3.8–10.5)
WBC # FLD AUTO: 3.95 K/UL — SIGNIFICANT CHANGE UP (ref 3.8–10.5)

## 2021-12-20 PROCEDURE — 99232 SBSQ HOSP IP/OBS MODERATE 35: CPT | Mod: GC

## 2021-12-20 RX ORDER — SODIUM CHLORIDE 9 MG/ML
1000 INJECTION INTRAMUSCULAR; INTRAVENOUS; SUBCUTANEOUS ONCE
Refills: 0 | Status: COMPLETED | OUTPATIENT
Start: 2021-12-20 | End: 2021-12-20

## 2021-12-20 RX ORDER — SODIUM CHLORIDE 9 MG/ML
1000 INJECTION, SOLUTION INTRAVENOUS
Refills: 0 | Status: DISCONTINUED | OUTPATIENT
Start: 2021-12-20 | End: 2021-12-20

## 2021-12-20 RX ORDER — OFLOXACIN 0.3 %
1 DROPS OPHTHALMIC (EYE)
Refills: 0 | Status: COMPLETED | OUTPATIENT
Start: 2021-12-20 | End: 2021-12-25

## 2021-12-20 RX ORDER — CIPROFLOXACIN LACTATE 400MG/40ML
500 VIAL (ML) INTRAVENOUS EVERY 12 HOURS
Refills: 0 | Status: COMPLETED | OUTPATIENT
Start: 2021-12-20 | End: 2021-12-27

## 2021-12-20 RX ORDER — ERYTHROMYCIN BASE 5 MG/GRAM
1 OINTMENT (GRAM) OPHTHALMIC (EYE) DAILY
Refills: 0 | Status: COMPLETED | OUTPATIENT
Start: 2021-12-20 | End: 2021-12-25

## 2021-12-20 RX ADMIN — Medication 1 APPLICATION(S): at 18:33

## 2021-12-20 RX ADMIN — Medication 500 MILLIGRAM(S): at 18:44

## 2021-12-20 RX ADMIN — Medication 81 MILLIGRAM(S): at 12:09

## 2021-12-20 RX ADMIN — LOSARTAN POTASSIUM 100 MILLIGRAM(S): 100 TABLET, FILM COATED ORAL at 06:47

## 2021-12-20 RX ADMIN — CARVEDILOL PHOSPHATE 12.5 MILLIGRAM(S): 80 CAPSULE, EXTENDED RELEASE ORAL at 18:33

## 2021-12-20 RX ADMIN — Medication 1 DROP(S): at 00:25

## 2021-12-20 RX ADMIN — Medication 1 DROP(S): at 06:01

## 2021-12-20 RX ADMIN — Medication 1 DROP(S): at 18:33

## 2021-12-20 RX ADMIN — LATANOPROST 1 DROP(S): 0.05 SOLUTION/ DROPS OPHTHALMIC; TOPICAL at 22:13

## 2021-12-20 RX ADMIN — Medication 200 MILLIGRAM(S): at 05:54

## 2021-12-20 RX ADMIN — Medication 25 MILLIGRAM(S): at 12:09

## 2021-12-20 RX ADMIN — MONTELUKAST 10 MILLIGRAM(S): 4 TABLET, CHEWABLE ORAL at 22:13

## 2021-12-20 RX ADMIN — Medication 1 TABLET(S): at 06:01

## 2021-12-20 RX ADMIN — Medication 1 TABLET(S): at 18:47

## 2021-12-20 RX ADMIN — Medication 1 APPLICATION(S): at 12:08

## 2021-12-20 RX ADMIN — Medication 1 DROP(S): at 12:08

## 2021-12-20 RX ADMIN — Medication 1 TABLET(S): at 12:12

## 2021-12-20 RX ADMIN — BUDESONIDE AND FORMOTEROL FUMARATE DIHYDRATE 2 PUFF(S): 160; 4.5 AEROSOL RESPIRATORY (INHALATION) at 15:12

## 2021-12-20 RX ADMIN — CARVEDILOL PHOSPHATE 12.5 MILLIGRAM(S): 80 CAPSULE, EXTENDED RELEASE ORAL at 05:54

## 2021-12-20 RX ADMIN — Medication 1 DROP(S): at 18:43

## 2021-12-20 RX ADMIN — Medication 1 DROP(S): at 06:47

## 2021-12-20 RX ADMIN — ENOXAPARIN SODIUM 40 MILLIGRAM(S): 100 INJECTION SUBCUTANEOUS at 12:09

## 2021-12-20 RX ADMIN — ATORVASTATIN CALCIUM 40 MILLIGRAM(S): 80 TABLET, FILM COATED ORAL at 22:17

## 2021-12-20 RX ADMIN — SODIUM CHLORIDE 250 MILLILITER(S): 9 INJECTION INTRAMUSCULAR; INTRAVENOUS; SUBCUTANEOUS at 15:12

## 2021-12-20 NOTE — SWALLOW BEDSIDE ASSESSMENT ADULT - ADDITIONAL RECOMMENDATIONS
1. GI Consult is recommended at MD's discretion given patient complaints of stuck sensation/ 'food moving slowly' near distal esophagus

## 2021-12-20 NOTE — PROGRESS NOTE ADULT - PROBLEM SELECTOR PLAN 2
- pt reports dizziness with rest and worse with ambulating  - dizziness possibly 2/2 inner ear etiology vs stroke  - Neuro following  - continue asa/statin   - neuro checks q 4 hours  - aspiration/fall/safety precautions  - c/w meclizine 25mg daily as patient reports improvement with this  - f/u TTE, MRI head/MRI Internal Auditory Canal to r/o stroke  - unclear history of afib. Patient denies. Will f/u with outpatient PCP. No events on tele

## 2021-12-20 NOTE — PROGRESS NOTE ADULT - ATTENDING COMMENTS
72 yo M PMHx of COPD, Asthma, Ulcerative Colitis, DM and HTN admitted for otitis externa, b/l bacterial conjunctivitis and dizziness.     -ear pain improving, c/w cipro  -c/w ofloxacin and erythromycin for conjunctivitis  -will d/w ENT regarding abx duration  -MRI pending for evaluation of dizziness, r/o CVA  -check orthostatics  -coughing after swallowing water noted, will consult speech and swallow  -PT recommending rehab- patient agreeable    D/w Dr. Trejo

## 2021-12-20 NOTE — PROGRESS NOTE ADULT - SUBJECTIVE AND OBJECTIVE BOX
[facility] DEPARTMENT OF OPHTHALMOLOGY  ------------------------------------------------------------------------------  Ronald Junior MD PGY 3  Pager: [phone]  ------------------------------------------------------------------------------    Interval History: No acute events overnight. Follow up for red eye. Patient reports improvement in irritation of eye, still with mild irritation, and redness. Today patient denying blurred vision, eye pain, flashes, floaters, FBS, erythema, or discharge.     MEDICATIONS  (STANDING):  artificial tears (preservative free) Ophthalmic Solution 1 Drop(s) Both EYES every 6 hours  aspirin enteric coated 81 milliGRAM(s) Oral daily  atorvastatin 40 milliGRAM(s) Oral at bedtime  budesonide 160 MICROgram(s)/formoterol 4.5 MICROgram(s) Inhaler 2 Puff(s) Inhalation two times a day  carvedilol 12.5 milliGRAM(s) Oral every 12 hours  ciprofloxacin   IVPB 400 milliGRAM(s) IV Intermittent every 12 hours  dextrose 40% Gel 15 Gram(s) Oral once  dextrose 5%. 1000 milliLiter(s) (50 mL/Hr) IV Continuous <Continuous>  dextrose 5%. 1000 milliLiter(s) (100 mL/Hr) IV Continuous <Continuous>  dextrose 50% Injectable 25 Gram(s) IV Push once  dextrose 50% Injectable 12.5 Gram(s) IV Push once  dextrose 50% Injectable 25 Gram(s) IV Push once  diphenoxylate/atropine 1 Tablet(s) Oral two times a day  enoxaparin Injectable 40 milliGRAM(s) SubCutaneous daily  erythromycin   Ointment 1 Application(s) Both EYES daily  glucagon  Injectable 1 milliGRAM(s) IntraMuscular once  insulin lispro (ADMELOG) corrective regimen sliding scale   SubCutaneous three times a day before meals  latanoprost 0.005% Ophthalmic Solution 1 Drop(s) Both EYES at bedtime  losartan 100 milliGRAM(s) Oral daily  meclizine 25 milliGRAM(s) Oral daily  montelukast 10 milliGRAM(s) Oral at bedtime  multivitamin 1 Tablet(s) Oral daily  ofloxacin 0.3% Solution 1 Drop(s) Both EYES four times a day    MEDICATIONS  (PRN):  acetaminophen     Tablet .. 650 milliGRAM(s) Oral every 6 hours PRN Mild Pain (1 - 3), Moderate Pain (4 - 6)  ALBUTerol    90 MICROgram(s) HFA Inhaler 2 Puff(s) Inhalation every 6 hours PRN Shortness of Breath and/or Wheezing      VITALS: T(C): 36.5 (12-20-21 @ 06:58)  T(F): 97.7 (12-20-21 @ 06:58), Max: 98.3 (12-19-21 @ 22:59)  HR: 70 (12-20-21 @ 06:58) (70 - 88)  BP: 152/78 (12-20-21 @ 06:58) (123/71 - 152/78)  RR:  (18 - 18)  SpO2:  (95% - 100%)  Wt(kg): --  General: AAO x 3, appropriate mood and affect    Ophthalmology Exam:  Visual acuity (cc): 20/25 OU  Pupils: PERRL OU, no APD  Ttono: 20, 20  Extraocular movements (EOMs): Full OU, no pain, no diplopia  Confrontational Visual Field (CVF): Full OU  Color Plates: 12/12 OU    Pen Light Exam (PLE)  External: Flat OU  Lids/Lashes/Lacrimal Ducts: ectropion OD>OS. Scant discharge medially with possible small focal collection medial inferior lid OD.   Sclera/Conjunctiva: 1-2+ injection OD>OS  Cornea: Cl OU  Anterior Chamber: D+F OU    Iris: Flat OU  Lens: NS OU

## 2021-12-20 NOTE — PROGRESS NOTE ADULT - ASSESSMENT
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 Ophthalmology consulted for eye redness OU Slightly improved exam today.    # Eye redness both eyes  - Likely dryness/irritation given dry eye appearance with ectropion  - also possible Chalazion component right side  - cw ofloxacin gtts QID 5-7 days  - Cw erythromycin ointment QHS both eyes  - Start Artificial tears 4 times a day Both eyes    # glaucoma suspect vs. ocular HTN  - follows with Dr. Anil Crystal, last HVF, IOP reportedly normal per pt  - continue xalatan QHS both eyes    SDW Dr. Mina     Outpatient follow-up: Patient should follow-up with his/her ophthalmologist or with [facility] Department of Ophthalmology at the address below     [address] [place] [address]. 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 Ophthalmology consulted for eye redness OU Slightly improved exam today.    # Eye redness both eyes  - Likely dryness/irritation given dry eye appearance with ectropion  - also possible Chalazion component right side  - cw ofloxacin gtts QID 5-7 days  - Cw erythromycin ointment QHS both eyes  - Start Artificial tears 4 times a day Both eyes  - Start Warm compress TID    # glaucoma suspect vs. ocular HTN  - follows with Dr. Anil Crystal, last HVF, IOP reportedly normal per pt  - continue xalatan QHS both eyes    SDW Dr. Mina     Outpatient follow-up: Patient should follow-up with his/her ophthalmologist or with  Department of Ophthalmology at the address below     [address] [place] [address]. [address]  [place] [address]  [phone]

## 2021-12-20 NOTE — SWALLOW BEDSIDE ASSESSMENT ADULT - SWALLOW EVAL: RECOMMENDED FEEDING/EATING TECHNIQUES
Feeding Guidelines: Small Bites/Small Sips, slow pace, alternate liquids/solids, upright for all meals

## 2021-12-20 NOTE — PROGRESS NOTE ADULT - PROBLEM SELECTOR PLAN 1
- pt reports left ear pain and swelling x 1 month  - pt is s/p PO bactrim outpatient   - CT concerning for necrotizing otitis externa  - pt is s/p IV Cipro and IV clinda in ER  - ENT following, rec Ciprofloxacin for chondritis to cover for pseudomonas  - Will continue IV Cipro    - F/U MRI head/ICA w/wo contrast

## 2021-12-20 NOTE — PROGRESS NOTE ADULT - SUBJECTIVE AND OBJECTIVE BOX
~~~~~~~~~~~~~~~~~~~~~~~~~~~~~~~~~~  Conrad Trejo, PGY2  Pager [phone] (NS) [phone] ([facility])  After 7: Night Float Pager  ~~~~~~~~~~~~~~~~~~~~~~~~~~~~~~~~~~    PROGRESS NOTE:     Patient is a 71y old  Male who presents with a chief complaint of dizziness, ear pain (19 Dec 2021 07:35)      SUBJECTIVE / OVERNIGHT EVENTS:  -No acute events overnight  -Afebrile    ADDITIONAL REVIEW OF SYSTEMS:    MEDICATIONS  (STANDING):  artificial tears (preservative free) Ophthalmic Solution 1 Drop(s) Both EYES every 6 hours  aspirin enteric coated 81 milliGRAM(s) Oral daily  atorvastatin 40 milliGRAM(s) Oral at bedtime  budesonide 160 MICROgram(s)/formoterol 4.5 MICROgram(s) Inhaler 2 Puff(s) Inhalation two times a day  carvedilol 12.5 milliGRAM(s) Oral every 12 hours  ciprofloxacin   IVPB 400 milliGRAM(s) IV Intermittent every 12 hours  dextrose 40% Gel 15 Gram(s) Oral once  dextrose 5%. 1000 milliLiter(s) (50 mL/Hr) IV Continuous <Continuous>  dextrose 5%. 1000 milliLiter(s) (100 mL/Hr) IV Continuous <Continuous>  dextrose 50% Injectable 25 Gram(s) IV Push once  dextrose 50% Injectable 12.5 Gram(s) IV Push once  dextrose 50% Injectable 25 Gram(s) IV Push once  diphenoxylate/atropine 1 Tablet(s) Oral two times a day  enoxaparin Injectable 40 milliGRAM(s) SubCutaneous daily  erythromycin   Ointment 1 Application(s) Both EYES daily  glucagon  Injectable 1 milliGRAM(s) IntraMuscular once  insulin lispro (ADMELOG) corrective regimen sliding scale   SubCutaneous three times a day before meals  latanoprost 0.005% Ophthalmic Solution 1 Drop(s) Both EYES at bedtime  losartan 100 milliGRAM(s) Oral daily  meclizine 25 milliGRAM(s) Oral daily  montelukast 10 milliGRAM(s) Oral at bedtime  multivitamin 1 Tablet(s) Oral daily  ofloxacin 0.3% Solution 1 Drop(s) Both EYES four times a day    MEDICATIONS  (PRN):  acetaminophen     Tablet .. 650 milliGRAM(s) Oral every 6 hours PRN Mild Pain (1 - 3), Moderate Pain (4 - 6)  ALBUTerol    90 MICROgram(s) HFA Inhaler 2 Puff(s) Inhalation every 6 hours PRN Shortness of Breath and/or Wheezing      CAPILLARY BLOOD GLUCOSE      POCT Blood Glucose.: 120 mg/dL (19 Dec 2021 22:20)  POCT Blood Glucose.: 119 mg/dL (19 Dec 2021 17:50)  POCT Blood Glucose.: 138 mg/dL (19 Dec 2021 12:34)  POCT Blood Glucose.: 106 mg/dL (19 Dec 2021 08:37)    I&O's Summary      PHYSICAL EXAM:  Vital Signs Last 24 Hrs  T(C): 36.5 (20 Dec 2021 06:58), Max: 36.8 (19 Dec 2021 11:30)  T(F): 97.7 (20 Dec 2021 06:58), Max: 98.3 (19 Dec 2021 22:59)  HR: 70 (20 Dec 2021 06:58) (70 - 88)  BP: 152/78 (20 Dec 2021 06:58) (123/71 - 152/78)  BP(mean): --  RR: 18 (20 Dec 2021 06:58) (18 - 18)  SpO2: 100% (20 Dec 2021 06:58) (95% - 100%)    GENERAL: NAD, well-developed  HEAD:  Atraumatic, Normocephalic  EYES: no pain with extraocular eye movements. PERRLA, +erythema of bilateral conjunctiva  EARS: +TTP of left ear. Increased erythema and edema of cartilage of left ear  NECK: Supple, No JVD, no lymphadenopathy, thyroid normal to palpation  CHEST/LUNG: Clear to auscultation bilaterally; No wheeze  HEART: Regular rate and rhythm; No murmurs, rubs, or gallops  ABDOMEN: Soft, Nontender, Nondistended; Bowel sounds present  EXTREMITIES:  2+ Peripheral Pulses, No clubbing, cyanosis, or edema  PSYCH: AAOx3  NEUROLOGY: CN 2-7 intact, 9-12 intact. Decreased hearing in bilateral ears. 5/5 strength in UE, LE bilaterally. Sensations equal and intact throughout. Normal finger to nose, heel to shin test.  SKIN: No rashes or lesions    LABS:                        9.7    3.47  )-----------( 538      ( 19 Dec 2021 07:34 )             34.8     12-19    135  |  100  |  12  ----------------------------<  90  4.2   |  25  |  0.97    Ca    8.7      19 Dec 2021 07:34  Phos  3.0     12-19  Mg     1.90     12-19    TPro  7.9  /  Alb  2.9<L>  /  TBili  0.4  /  DBili  x   /  AST  20  /  ALT  15  /  AlkPhos  103  12-19                RADIOLOGY & ADDITIONAL TESTS:  Results Reviewed:   Imaging Personally Reviewed:  Electrocardiogram Personally Reviewed:    COORDINATION OF CARE:  Care Discussed with Consultants/Other Providers [Y/N]:  Prior or Outpatient Records Reviewed [Y/N]:

## 2021-12-20 NOTE — PROGRESS NOTE ADULT - ASSESSMENT
72 yo M PMHx of COPD, Asthma, ?atrial fibrillation per chart (not on AC), Ulcerative Colitis, DM (no longer on insulin) and HTN presenting with complaint of left ear pain x 1 month presenting with inability to ambulate 2/2 dizziness described as room spinning sensation, 9/10 frontal headache, and bilateral eye erythema. CT IAC concerning for necrotizing otitis externa. Started on cipro. Pending MRI to r/o stroke, though dizziness is likely peripheral in etiology.

## 2021-12-21 LAB
ANION GAP SERPL CALC-SCNC: 10 MMOL/L — SIGNIFICANT CHANGE UP (ref 7–14)
BASOPHILS # BLD AUTO: 0.01 K/UL — SIGNIFICANT CHANGE UP (ref 0–0.2)
BASOPHILS NFR BLD AUTO: 0.2 % — SIGNIFICANT CHANGE UP (ref 0–2)
BUN SERPL-MCNC: 13 MG/DL — SIGNIFICANT CHANGE UP (ref 7–23)
CALCIUM SERPL-MCNC: 8.3 MG/DL — LOW (ref 8.4–10.5)
CHLORIDE SERPL-SCNC: 105 MMOL/L — SIGNIFICANT CHANGE UP (ref 98–107)
CO2 SERPL-SCNC: 21 MMOL/L — LOW (ref 22–31)
CREAT SERPL-MCNC: 0.99 MG/DL — SIGNIFICANT CHANGE UP (ref 0.5–1.3)
EOSINOPHIL # BLD AUTO: 0.23 K/UL — SIGNIFICANT CHANGE UP (ref 0–0.5)
EOSINOPHIL NFR BLD AUTO: 4.8 % — SIGNIFICANT CHANGE UP (ref 0–6)
GLUCOSE BLDC GLUCOMTR-MCNC: 101 MG/DL — HIGH (ref 70–99)
GLUCOSE BLDC GLUCOMTR-MCNC: 103 MG/DL — HIGH (ref 70–99)
GLUCOSE BLDC GLUCOMTR-MCNC: 110 MG/DL — HIGH (ref 70–99)
GLUCOSE BLDC GLUCOMTR-MCNC: 116 MG/DL — HIGH (ref 70–99)
GLUCOSE SERPL-MCNC: 85 MG/DL — SIGNIFICANT CHANGE UP (ref 70–99)
HCT VFR BLD CALC: 33.1 % — LOW (ref 39–50)
HGB BLD-MCNC: 9.2 G/DL — LOW (ref 13–17)
IANC: 2.74 K/UL — SIGNIFICANT CHANGE UP (ref 1.5–8.5)
IMM GRANULOCYTES NFR BLD AUTO: 0.6 % — SIGNIFICANT CHANGE UP (ref 0–1.5)
LYMPHOCYTES # BLD AUTO: 1.07 K/UL — SIGNIFICANT CHANGE UP (ref 1–3.3)
LYMPHOCYTES # BLD AUTO: 22.2 % — SIGNIFICANT CHANGE UP (ref 13–44)
MAGNESIUM SERPL-MCNC: 1.7 MG/DL — SIGNIFICANT CHANGE UP (ref 1.6–2.6)
MCHC RBC-ENTMCNC: 20 PG — LOW (ref 27–34)
MCHC RBC-ENTMCNC: 27.8 GM/DL — LOW (ref 32–36)
MCV RBC AUTO: 72.1 FL — LOW (ref 80–100)
MONOCYTES # BLD AUTO: 0.75 K/UL — SIGNIFICANT CHANGE UP (ref 0–0.9)
MONOCYTES NFR BLD AUTO: 15.5 % — HIGH (ref 2–14)
NEUTROPHILS # BLD AUTO: 2.74 K/UL — SIGNIFICANT CHANGE UP (ref 1.8–7.4)
NEUTROPHILS NFR BLD AUTO: 56.7 % — SIGNIFICANT CHANGE UP (ref 43–77)
NRBC # BLD: 0 /100 WBCS — SIGNIFICANT CHANGE UP
NRBC # FLD: 0 K/UL — SIGNIFICANT CHANGE UP
PHOSPHATE SERPL-MCNC: 2.7 MG/DL — SIGNIFICANT CHANGE UP (ref 2.5–4.5)
PLATELET # BLD AUTO: 540 K/UL — HIGH (ref 150–400)
POTASSIUM SERPL-MCNC: 3.9 MMOL/L — SIGNIFICANT CHANGE UP (ref 3.5–5.3)
POTASSIUM SERPL-SCNC: 3.9 MMOL/L — SIGNIFICANT CHANGE UP (ref 3.5–5.3)
RBC # BLD: 4.59 M/UL — SIGNIFICANT CHANGE UP (ref 4.2–5.8)
RBC # FLD: 20 % — HIGH (ref 10.3–14.5)
SODIUM SERPL-SCNC: 136 MMOL/L — SIGNIFICANT CHANGE UP (ref 135–145)
WBC # BLD: 4.83 K/UL — SIGNIFICANT CHANGE UP (ref 3.8–10.5)
WBC # FLD AUTO: 4.83 K/UL — SIGNIFICANT CHANGE UP (ref 3.8–10.5)

## 2021-12-21 PROCEDURE — 93306 TTE W/DOPPLER COMPLETE: CPT | Mod: 26

## 2021-12-21 PROCEDURE — 99232 SBSQ HOSP IP/OBS MODERATE 35: CPT | Mod: GC

## 2021-12-21 PROCEDURE — 70553 MRI BRAIN STEM W/O & W/DYE: CPT | Mod: 26

## 2021-12-21 RX ORDER — MECLIZINE HCL 12.5 MG
25 TABLET ORAL ONCE
Refills: 0 | Status: DISCONTINUED | OUTPATIENT
Start: 2021-12-21 | End: 2021-12-22

## 2021-12-21 RX ADMIN — Medication 1 DROP(S): at 17:41

## 2021-12-21 RX ADMIN — BUDESONIDE AND FORMOTEROL FUMARATE DIHYDRATE 2 PUFF(S): 160; 4.5 AEROSOL RESPIRATORY (INHALATION) at 01:03

## 2021-12-21 RX ADMIN — Medication 500 MILLIGRAM(S): at 06:22

## 2021-12-21 RX ADMIN — Medication 1 TABLET(S): at 13:51

## 2021-12-21 RX ADMIN — Medication 1 TABLET(S): at 06:22

## 2021-12-21 RX ADMIN — Medication 1 APPLICATION(S): at 13:52

## 2021-12-21 RX ADMIN — Medication 25 MILLIGRAM(S): at 13:51

## 2021-12-21 RX ADMIN — Medication 1 DROP(S): at 13:52

## 2021-12-21 RX ADMIN — CARVEDILOL PHOSPHATE 12.5 MILLIGRAM(S): 80 CAPSULE, EXTENDED RELEASE ORAL at 06:21

## 2021-12-21 RX ADMIN — Medication 1 DROP(S): at 01:01

## 2021-12-21 RX ADMIN — Medication 1 DROP(S): at 06:22

## 2021-12-21 RX ADMIN — Medication 500 MILLIGRAM(S): at 17:41

## 2021-12-21 RX ADMIN — ATORVASTATIN CALCIUM 40 MILLIGRAM(S): 80 TABLET, FILM COATED ORAL at 21:51

## 2021-12-21 RX ADMIN — MONTELUKAST 10 MILLIGRAM(S): 4 TABLET, CHEWABLE ORAL at 21:51

## 2021-12-21 RX ADMIN — Medication 1 DROP(S): at 01:02

## 2021-12-21 RX ADMIN — LOSARTAN POTASSIUM 100 MILLIGRAM(S): 100 TABLET, FILM COATED ORAL at 06:21

## 2021-12-21 RX ADMIN — Medication 81 MILLIGRAM(S): at 13:51

## 2021-12-21 RX ADMIN — BUDESONIDE AND FORMOTEROL FUMARATE DIHYDRATE 2 PUFF(S): 160; 4.5 AEROSOL RESPIRATORY (INHALATION) at 17:40

## 2021-12-21 RX ADMIN — Medication 1 DROP(S): at 06:21

## 2021-12-21 RX ADMIN — Medication 1 TABLET(S): at 17:44

## 2021-12-21 RX ADMIN — CARVEDILOL PHOSPHATE 12.5 MILLIGRAM(S): 80 CAPSULE, EXTENDED RELEASE ORAL at 17:40

## 2021-12-21 RX ADMIN — Medication 1 DROP(S): at 13:53

## 2021-12-21 RX ADMIN — ENOXAPARIN SODIUM 40 MILLIGRAM(S): 100 INJECTION SUBCUTANEOUS at 13:50

## 2021-12-21 NOTE — PROGRESS NOTE ADULT - ASSESSMENT
70 yo M PMHx of COPD, Asthma, ?atrial fibrillation per chart (not on AC), Ulcerative Colitis, DM (no longer on insulin) and HTN presenting with complaint of left ear pain x 1 month presenting with inability to ambulate 2/2 dizziness described as room spinning sensation, 9/10 frontal headache, and bilateral eye erythema. CT IAC concerning for necrotizing otitis externa. Started on cipro. Pending MRI to r/o stroke, though dizziness is likely peripheral in etiology.

## 2021-12-21 NOTE — PROGRESS NOTE ADULT - ATTENDING COMMENTS
72 yo M PMHx of COPD, Asthma, Ulcerative Colitis, DM and HTN admitted for otitis externa, b/l bacterial conjunctivitis and dizziness.     -mri findings reviewed- negative for CVA or masses, left external otitis present  -can d/c telemetry  -c/w cipro  -appreciate ophtho recs  -c/w ofloxacin and erythromycin for conjunctivitis  -orthostatics positive on 12/20, s/p IVF, will repeat orthostatics today  -speech and swallow eval noted- will recommend outpatient GI follow up for globus sensation  -PT recommending rehab- patient agreeable    D/w Dr. Trejo 72 yo M PMHx of COPD, Asthma, Ulcerative Colitis, DM and HTN admitted for otitis externa, b/l bacterial conjunctivitis and dizziness.     -mri findings reviewed- negative for CVA or masses, left external otitis present  -can d/c telemetry  -c/w cipro  -appreciate ophtho recs  -c/w ofloxacin and erythromycin for conjunctivitis  -orthostatics positive on 12/20, s/p IVF, will repeat orthostatics today  -speech and swallow eval noted- will recommend outpatient GI follow up for globus sensation  -PT recommending rehab- patient states he and his wife will discuss it    D/w Dr. Trejo

## 2021-12-21 NOTE — PROGRESS NOTE ADULT - PROBLEM SELECTOR PLAN 1
- pt reports left ear pain and swelling x 1 month  - pt is s/p PO bactrim outpatient   - CT concerning for necrotizing otitis externa  - pt is s/p IV Cipro and IV clinda in ER  - ENT following, rec Ciprofloxacin for chondritis to cover for pseudomonas  - Will continue cipro, changed to PO  - F/U MRI head/ICA w/wo contrast

## 2021-12-21 NOTE — PROGRESS NOTE ADULT - SUBJECTIVE AND OBJECTIVE BOX
~~~~~~~~~~~~~~~~~~~~~~~~~~~~~~~~~~  Conrad Trejo, PGY2  Pager [phone] (NS) [phone] ([facility])  After 7: Night Float Pager  ~~~~~~~~~~~~~~~~~~~~~~~~~~~~~~~~~~    PROGRESS NOTE:     Patient is a 71y old  Male who presents with a chief complaint of dizziness, ear pain (20 Dec 2021 12:48)      SUBJECTIVE / OVERNIGHT EVENTS:  -Episode of fecal incontinence overnight  -Positive orthostatics yesterday  -Otherwise feeling ok    ADDITIONAL REVIEW OF SYSTEMS:    MEDICATIONS  (STANDING):  artificial  tears Solution 1 Drop(s) Both EYES four times a day  aspirin enteric coated 81 milliGRAM(s) Oral daily  atorvastatin 40 milliGRAM(s) Oral at bedtime  budesonide 160 MICROgram(s)/formoterol 4.5 MICROgram(s) Inhaler 2 Puff(s) Inhalation two times a day  carvedilol 12.5 milliGRAM(s) Oral every 12 hours  ciprofloxacin     Tablet 500 milliGRAM(s) Oral every 12 hours  dextrose 40% Gel 15 Gram(s) Oral once  dextrose 5%. 1000 milliLiter(s) (50 mL/Hr) IV Continuous <Continuous>  dextrose 5%. 1000 milliLiter(s) (100 mL/Hr) IV Continuous <Continuous>  dextrose 50% Injectable 25 Gram(s) IV Push once  dextrose 50% Injectable 12.5 Gram(s) IV Push once  dextrose 50% Injectable 25 Gram(s) IV Push once  diphenoxylate/atropine 1 Tablet(s) Oral two times a day  enoxaparin Injectable 40 milliGRAM(s) SubCutaneous daily  erythromycin   Ointment 1 Application(s) Both EYES daily  glucagon  Injectable 1 milliGRAM(s) IntraMuscular once  insulin lispro (ADMELOG) corrective regimen sliding scale   SubCutaneous three times a day before meals  latanoprost 0.005% Ophthalmic Solution 1 Drop(s) Both EYES at bedtime  losartan 100 milliGRAM(s) Oral daily  meclizine 25 milliGRAM(s) Oral daily  montelukast 10 milliGRAM(s) Oral at bedtime  multivitamin 1 Tablet(s) Oral daily  ofloxacin 0.3% Solution 1 Drop(s) Both EYES four times a day    MEDICATIONS  (PRN):  acetaminophen     Tablet .. 650 milliGRAM(s) Oral every 6 hours PRN Mild Pain (1 - 3), Moderate Pain (4 - 6)  ALBUTerol    90 MICROgram(s) HFA Inhaler 2 Puff(s) Inhalation every 6 hours PRN Shortness of Breath and/or Wheezing      CAPILLARY BLOOD GLUCOSE      POCT Blood Glucose.: 123 mg/dL (20 Dec 2021 21:24)  POCT Blood Glucose.: 125 mg/dL (20 Dec 2021 17:51)  POCT Blood Glucose.: 124 mg/dL (20 Dec 2021 12:40)  POCT Blood Glucose.: 127 mg/dL (20 Dec 2021 08:34)    I&O's Summary      PHYSICAL EXAM:  Vital Signs Last 24 Hrs  T(C): 36.8 (21 Dec 2021 06:22), Max: 36.8 (20 Dec 2021 14:45)  T(F): 98.2 (21 Dec 2021 06:22), Max: 98.3 (20 Dec 2021 14:45)  HR: 81 (21 Dec 2021 06:22) (74 - 120)  BP: 127/86 (21 Dec 2021 06:22) (111/55 - 127/86)  BP(mean): --  RR: 16 (21 Dec 2021 06:22) (15 - 20)  SpO2: 98% (21 Dec 2021 06:22) (97% - 99%)    CONSTITUTIONAL: NAD, well-developed  RESPIRATORY: Normal respiratory effort; lungs are clear to auscultation bilaterally  CARDIOVASCULAR: Regular rate and rhythm, normal S1 and S2, no murmur/rub/gallop; No lower extremity edema; Peripheral pulses are 2+ bilaterally  ABDOMEN: Nontender to palpation, normoactive bowel sounds, no rebound/guarding; No hepatosplenomegaly  MUSCLOSKELETAL: no clubbing or cyanosis of digits; no joint swelling or tenderness to palpation  PSYCH: A+O to person, place, and time; affect appropriate    LABS:                        10.4   3.95  )-----------( 528      ( 20 Dec 2021 08:19 )             36.1     12-20    133<L>  |  98  |  12  ----------------------------<  106<H>  4.4   |  20<L>  |  1.01    Ca    8.5      20 Dec 2021 08:19  Phos  2.8     12-20  Mg     1.80     12-20    TPro  7.9  /  Alb  2.9<L>  /  TBili  0.4  /  DBili  x   /  AST  20  /  ALT  15  /  AlkPhos  103  12-19                RADIOLOGY & ADDITIONAL TESTS:  Results Reviewed:   Imaging Personally Reviewed:  Electrocardiogram Personally Reviewed:    COORDINATION OF CARE:  Care Discussed with Consultants/Other Providers [Y/N]:  Prior or Outpatient Records Reviewed [Y/N]:   ~~~~~~~~~~~~~~~~~~~~~~~~~~~~~~~~~~  Conrad Trejo, PGY2  Pager [phone] (NS) [phone] ([facility])  After 7: Night Float Pager  ~~~~~~~~~~~~~~~~~~~~~~~~~~~~~~~~~~    PROGRESS NOTE:     Patient is a 71y old  Male who presents with a chief complaint of dizziness, ear pain (20 Dec 2021 12:48)      SUBJECTIVE / OVERNIGHT EVENTS:  -Episode of fecal incontinence overnight  -Positive orthostatics yesterday  -Otherwise feeling ok    ADDITIONAL REVIEW OF SYSTEMS:    MEDICATIONS  (STANDING):  artificial  tears Solution 1 Drop(s) Both EYES four times a day  aspirin enteric coated 81 milliGRAM(s) Oral daily  atorvastatin 40 milliGRAM(s) Oral at bedtime  budesonide 160 MICROgram(s)/formoterol 4.5 MICROgram(s) Inhaler 2 Puff(s) Inhalation two times a day  carvedilol 12.5 milliGRAM(s) Oral every 12 hours  ciprofloxacin     Tablet 500 milliGRAM(s) Oral every 12 hours  dextrose 40% Gel 15 Gram(s) Oral once  dextrose 5%. 1000 milliLiter(s) (50 mL/Hr) IV Continuous <Continuous>  dextrose 5%. 1000 milliLiter(s) (100 mL/Hr) IV Continuous <Continuous>  dextrose 50% Injectable 25 Gram(s) IV Push once  dextrose 50% Injectable 12.5 Gram(s) IV Push once  dextrose 50% Injectable 25 Gram(s) IV Push once  diphenoxylate/atropine 1 Tablet(s) Oral two times a day  enoxaparin Injectable 40 milliGRAM(s) SubCutaneous daily  erythromycin   Ointment 1 Application(s) Both EYES daily  glucagon  Injectable 1 milliGRAM(s) IntraMuscular once  insulin lispro (ADMELOG) corrective regimen sliding scale   SubCutaneous three times a day before meals  latanoprost 0.005% Ophthalmic Solution 1 Drop(s) Both EYES at bedtime  losartan 100 milliGRAM(s) Oral daily  meclizine 25 milliGRAM(s) Oral daily  montelukast 10 milliGRAM(s) Oral at bedtime  multivitamin 1 Tablet(s) Oral daily  ofloxacin 0.3% Solution 1 Drop(s) Both EYES four times a day    MEDICATIONS  (PRN):  acetaminophen     Tablet .. 650 milliGRAM(s) Oral every 6 hours PRN Mild Pain (1 - 3), Moderate Pain (4 - 6)  ALBUTerol    90 MICROgram(s) HFA Inhaler 2 Puff(s) Inhalation every 6 hours PRN Shortness of Breath and/or Wheezing      CAPILLARY BLOOD GLUCOSE      POCT Blood Glucose.: 123 mg/dL (20 Dec 2021 21:24)  POCT Blood Glucose.: 125 mg/dL (20 Dec 2021 17:51)  POCT Blood Glucose.: 124 mg/dL (20 Dec 2021 12:40)  POCT Blood Glucose.: 127 mg/dL (20 Dec 2021 08:34)    I&O's Summary      PHYSICAL EXAM:  Vital Signs Last 24 Hrs  T(C): 36.8 (21 Dec 2021 06:22), Max: 36.8 (20 Dec 2021 14:45)  T(F): 98.2 (21 Dec 2021 06:22), Max: 98.3 (20 Dec 2021 14:45)  HR: 81 (21 Dec 2021 06:22) (74 - 120)  BP: 127/86 (21 Dec 2021 06:22) (111/55 - 127/86)  BP(mean): --  RR: 16 (21 Dec 2021 06:22) (15 - 20)  SpO2: 98% (21 Dec 2021 06:22) (97% - 99%)    CONSTITUTIONAL: NAD, well-developed  ENT: TTP, L. pinna  RESPIRATORY: Normal respiratory effort; lungs are clear to auscultation bilaterally  CARDIOVASCULAR: Regular rate and rhythm, normal S1 and S2, no murmur/rub/gallop; No lower extremity edema; Peripheral pulses are 2+ bilaterally  ABDOMEN: Nontender to palpation, normoactive bowel sounds, no rebound/guarding; No hepatosplenomegaly  MUSCLOSKELETAL: no clubbing or cyanosis of digits; no joint swelling or tenderness to palpation  PSYCH: A+O to person, place, and time; affect appropriate    LABS:                        10.4   3.95  )-----------( 528      ( 20 Dec 2021 08:19 )             36.1     12-20    133<L>  |  98  |  12  ----------------------------<  106<H>  4.4   |  20<L>  |  1.01    Ca    8.5      20 Dec 2021 08:19  Phos  2.8     12-20  Mg     1.80     12-20    TPro  7.9  /  Alb  2.9<L>  /  TBili  0.4  /  DBili  x   /  AST  20  /  ALT  15  /  AlkPhos  103  12-19                RADIOLOGY & ADDITIONAL TESTS:  Results Reviewed:   Imaging Personally Reviewed:  Electrocardiogram Personally Reviewed:    COORDINATION OF CARE:  Care Discussed with Consultants/Other Providers [Y/N]:  Prior or Outpatient Records Reviewed [Y/N]:

## 2021-12-21 NOTE — PROGRESS NOTE ADULT - PROBLEM SELECTOR PLAN 2
- pt reports dizziness with rest and worse with ambulating  - dizziness possibly 2/2 inner ear etiology vs stroke  - Neuro following  - continue asa/statin   - neuro checks q 4 hours  - aspiration/fall/safety precautions  - c/w meclizine 25mg daily as patient reports improvement with this  - f/u TTE, MRI head/MRI Internal Auditory Canal to r/o stroke  - unclear history of afib. Patient denies. Will f/u with outpatient PCP. No events on tele - pt reports dizziness with rest and worse with ambulating  - dizziness possibly 2/2 inner ear etiology vs stroke  - Neuro following  - continue asa/statin   - neuro checks q 4 hours  - aspiration/fall/safety precautions  - c/w meclizine 25mg daily as patient reports improvement with this  - f/u TTE, MRI head/MRI Internal Auditory Canal to r/o stroke  - unclear history of afib. Patient denies. Will f/u with outpatient PCP. No events on tele  - + orthostatics, s/p IV hydration, repeat today

## 2021-12-22 LAB
ALBUMIN SERPL ELPH-MCNC: 2.6 G/DL — LOW (ref 3.3–5)
ALP SERPL-CCNC: 102 U/L — SIGNIFICANT CHANGE UP (ref 40–120)
ALT FLD-CCNC: 18 U/L — SIGNIFICANT CHANGE UP (ref 4–41)
ANION GAP SERPL CALC-SCNC: 12 MMOL/L — SIGNIFICANT CHANGE UP (ref 7–14)
AST SERPL-CCNC: 26 U/L — SIGNIFICANT CHANGE UP (ref 4–40)
BASOPHILS # BLD AUTO: 0.01 K/UL — SIGNIFICANT CHANGE UP (ref 0–0.2)
BASOPHILS NFR BLD AUTO: 0.2 % — SIGNIFICANT CHANGE UP (ref 0–2)
BILIRUB SERPL-MCNC: 0.4 MG/DL — SIGNIFICANT CHANGE UP (ref 0.2–1.2)
BUN SERPL-MCNC: 12 MG/DL — SIGNIFICANT CHANGE UP (ref 7–23)
CALCIUM SERPL-MCNC: 8.6 MG/DL — SIGNIFICANT CHANGE UP (ref 8.4–10.5)
CHLORIDE SERPL-SCNC: 102 MMOL/L — SIGNIFICANT CHANGE UP (ref 98–107)
CO2 SERPL-SCNC: 21 MMOL/L — LOW (ref 22–31)
CREAT SERPL-MCNC: 1 MG/DL — SIGNIFICANT CHANGE UP (ref 0.5–1.3)
EOSINOPHIL # BLD AUTO: 0.22 K/UL — SIGNIFICANT CHANGE UP (ref 0–0.5)
EOSINOPHIL NFR BLD AUTO: 3.8 % — SIGNIFICANT CHANGE UP (ref 0–6)
GLUCOSE BLDC GLUCOMTR-MCNC: 111 MG/DL — HIGH (ref 70–99)
GLUCOSE BLDC GLUCOMTR-MCNC: 115 MG/DL — HIGH (ref 70–99)
GLUCOSE BLDC GLUCOMTR-MCNC: 94 MG/DL — SIGNIFICANT CHANGE UP (ref 70–99)
GLUCOSE SERPL-MCNC: 84 MG/DL — SIGNIFICANT CHANGE UP (ref 70–99)
HCT VFR BLD CALC: 34.6 % — LOW (ref 39–50)
HGB BLD-MCNC: 9.6 G/DL — LOW (ref 13–17)
IANC: 3.63 K/UL — SIGNIFICANT CHANGE UP (ref 1.5–8.5)
IMM GRANULOCYTES NFR BLD AUTO: 0.5 % — SIGNIFICANT CHANGE UP (ref 0–1.5)
LYMPHOCYTES # BLD AUTO: 1.1 K/UL — SIGNIFICANT CHANGE UP (ref 1–3.3)
LYMPHOCYTES # BLD AUTO: 18.9 % — SIGNIFICANT CHANGE UP (ref 13–44)
MAGNESIUM SERPL-MCNC: 1.8 MG/DL — SIGNIFICANT CHANGE UP (ref 1.6–2.6)
MCHC RBC-ENTMCNC: 20.3 PG — LOW (ref 27–34)
MCHC RBC-ENTMCNC: 27.7 GM/DL — LOW (ref 32–36)
MCV RBC AUTO: 73.3 FL — LOW (ref 80–100)
MONOCYTES # BLD AUTO: 0.82 K/UL — SIGNIFICANT CHANGE UP (ref 0–0.9)
MONOCYTES NFR BLD AUTO: 14.1 % — HIGH (ref 2–14)
NEUTROPHILS # BLD AUTO: 3.63 K/UL — SIGNIFICANT CHANGE UP (ref 1.8–7.4)
NEUTROPHILS NFR BLD AUTO: 62.5 % — SIGNIFICANT CHANGE UP (ref 43–77)
NRBC # BLD: 0 /100 WBCS — SIGNIFICANT CHANGE UP
NRBC # FLD: 0 K/UL — SIGNIFICANT CHANGE UP
PHOSPHATE SERPL-MCNC: 2.7 MG/DL — SIGNIFICANT CHANGE UP (ref 2.5–4.5)
PLATELET # BLD AUTO: 543 K/UL — HIGH (ref 150–400)
POTASSIUM SERPL-MCNC: 3.8 MMOL/L — SIGNIFICANT CHANGE UP (ref 3.5–5.3)
POTASSIUM SERPL-SCNC: 3.8 MMOL/L — SIGNIFICANT CHANGE UP (ref 3.5–5.3)
PROT SERPL-MCNC: 7.9 G/DL — SIGNIFICANT CHANGE UP (ref 6–8.3)
RBC # BLD: 4.72 M/UL — SIGNIFICANT CHANGE UP (ref 4.2–5.8)
RBC # FLD: 20.2 % — HIGH (ref 10.3–14.5)
SODIUM SERPL-SCNC: 135 MMOL/L — SIGNIFICANT CHANGE UP (ref 135–145)
WBC # BLD: 5.81 K/UL — SIGNIFICANT CHANGE UP (ref 3.8–10.5)
WBC # FLD AUTO: 5.81 K/UL — SIGNIFICANT CHANGE UP (ref 3.8–10.5)

## 2021-12-22 PROCEDURE — 99232 SBSQ HOSP IP/OBS MODERATE 35: CPT | Mod: GC

## 2021-12-22 RX ORDER — MECLIZINE HCL 12.5 MG
12.5 TABLET ORAL THREE TIMES A DAY
Refills: 0 | Status: DISCONTINUED | OUTPATIENT
Start: 2021-12-22 | End: 2021-12-23

## 2021-12-22 RX ORDER — MECLIZINE HCL 12.5 MG
12.5 TABLET ORAL ONCE
Refills: 0 | Status: DISCONTINUED | OUTPATIENT
Start: 2021-12-22 | End: 2021-12-23

## 2021-12-22 RX ADMIN — ENOXAPARIN SODIUM 40 MILLIGRAM(S): 100 INJECTION SUBCUTANEOUS at 12:59

## 2021-12-22 RX ADMIN — ATORVASTATIN CALCIUM 40 MILLIGRAM(S): 80 TABLET, FILM COATED ORAL at 22:27

## 2021-12-22 RX ADMIN — Medication 1 DROP(S): at 12:56

## 2021-12-22 RX ADMIN — Medication 1 DROP(S): at 23:46

## 2021-12-22 RX ADMIN — Medication 500 MILLIGRAM(S): at 05:25

## 2021-12-22 RX ADMIN — BUDESONIDE AND FORMOTEROL FUMARATE DIHYDRATE 2 PUFF(S): 160; 4.5 AEROSOL RESPIRATORY (INHALATION) at 12:56

## 2021-12-22 RX ADMIN — CARVEDILOL PHOSPHATE 12.5 MILLIGRAM(S): 80 CAPSULE, EXTENDED RELEASE ORAL at 05:25

## 2021-12-22 RX ADMIN — Medication 1 DROP(S): at 00:02

## 2021-12-22 RX ADMIN — Medication 12.5 MILLIGRAM(S): at 13:14

## 2021-12-22 RX ADMIN — BUDESONIDE AND FORMOTEROL FUMARATE DIHYDRATE 2 PUFF(S): 160; 4.5 AEROSOL RESPIRATORY (INHALATION) at 05:24

## 2021-12-22 RX ADMIN — CARVEDILOL PHOSPHATE 12.5 MILLIGRAM(S): 80 CAPSULE, EXTENDED RELEASE ORAL at 18:20

## 2021-12-22 RX ADMIN — MONTELUKAST 10 MILLIGRAM(S): 4 TABLET, CHEWABLE ORAL at 22:27

## 2021-12-22 RX ADMIN — Medication 12.5 MILLIGRAM(S): at 22:27

## 2021-12-22 RX ADMIN — Medication 1 DROP(S): at 18:21

## 2021-12-22 RX ADMIN — Medication 81 MILLIGRAM(S): at 12:59

## 2021-12-22 RX ADMIN — BUDESONIDE AND FORMOTEROL FUMARATE DIHYDRATE 2 PUFF(S): 160; 4.5 AEROSOL RESPIRATORY (INHALATION) at 22:21

## 2021-12-22 RX ADMIN — Medication 1 TABLET(S): at 13:00

## 2021-12-22 RX ADMIN — Medication 1 TABLET(S): at 18:20

## 2021-12-22 RX ADMIN — Medication 1 DROP(S): at 12:57

## 2021-12-22 RX ADMIN — Medication 650 MILLIGRAM(S): at 22:47

## 2021-12-22 RX ADMIN — Medication 1 APPLICATION(S): at 12:56

## 2021-12-22 RX ADMIN — Medication 1 DROP(S): at 05:24

## 2021-12-22 RX ADMIN — Medication 650 MILLIGRAM(S): at 23:45

## 2021-12-22 RX ADMIN — LOSARTAN POTASSIUM 100 MILLIGRAM(S): 100 TABLET, FILM COATED ORAL at 05:25

## 2021-12-22 RX ADMIN — LATANOPROST 1 DROP(S): 0.05 SOLUTION/ DROPS OPHTHALMIC; TOPICAL at 22:28

## 2021-12-22 RX ADMIN — Medication 500 MILLIGRAM(S): at 18:24

## 2021-12-22 RX ADMIN — LATANOPROST 1 DROP(S): 0.05 SOLUTION/ DROPS OPHTHALMIC; TOPICAL at 00:02

## 2021-12-22 RX ADMIN — Medication 1 TABLET(S): at 05:24

## 2021-12-22 NOTE — PROGRESS NOTE ADULT - PROBLEM SELECTOR PLAN 2
- pt reports dizziness with rest and worse with ambulating  - dizziness possibly 2/2 inner ear etiology vs stroke  - Neuro following  - continue asa/statin   - neuro checks q 4 hours  - aspiration/fall/safety precautions  - c/w meclizine 25mg daily as patient reports improvement with this  - TTE, and MR negative for cardioembolic etiology  - unclear history of afib. Patient denies. Will f/u with outpatient PCP. No events on tele  - + orthostatics, s/p IV hydration, repeat wnl - pt reports dizziness with rest and worse with ambulating  - dizziness possibly 2/2 inner ear etiology   - Neuro following  - continue asa/statin   - aspiration/fall/safety precautions  - c/w meclizine 12.5mg TID as patient reports improvement with this  - TTE, and MR negative for cardioembolic etiology  - unclear history of afib. Patient denies. Will f/u with outpatient PCP. No events on tele  - + orthostatics, s/p IV hydration, repeat wnl

## 2021-12-22 NOTE — PROGRESS NOTE ADULT - ASSESSMENT
70 yo M PMHx of COPD, Asthma, ?atrial fibrillation per chart (not on AC), Ulcerative Colitis, DM (no longer on insulin) and HTN presenting with complaint of left ear pain x 1 month presenting with inability to ambulate 2/2 dizziness described as room spinning sensation, 9/10 frontal headache, and bilateral eye erythema. CT IAC concerning for necrotizing otitis externa. MR negative for acute stroke and internal auditory canal involvement.

## 2021-12-22 NOTE — PROGRESS NOTE ADULT - ATTENDING COMMENTS
72 yo M PMHx of COPD, Asthma, Ulcerative Colitis, DM and HTN admitted for otitis externa, b/l bacterial conjunctivitis and dizziness.     -mri findings reviewed- negative for CVA or masses, left external otitis present  -still with dizziness today, likely in setting of residual infection, c/w meclizine  -c/w cipro through 12/27  -appreciate ophtho recs  -c/w ofloxacin and erythromycin for conjunctivitis  -orthostatics positive on 12/20, s/p IVF, repeat orthostatics negative  -speech and swallow eval noted- patient still c/o sensation of pills getting stuck after swallowing, will consult GI  -PT recommending rehab- patient states he and his wife will discuss it, will have SW f/u    D/w Dr. Trejo

## 2021-12-22 NOTE — PROGRESS NOTE ADULT - PROBLEM SELECTOR PLAN 1
- pt reports left ear pain and swelling x 1 month  - pt is s/p PO bactrim outpatient   - CT concerning for necrotizing otitis externa, MR negative for IAC involvement  - pt is s/p IV Cipro and IV clinda in ER  - ENT following, rec Ciprofloxacin for chondritis to cover for pseudomonas  - Will continue cipro, changed to PO

## 2021-12-22 NOTE — PROGRESS NOTE ADULT - SUBJECTIVE AND OBJECTIVE BOX
~~~~~~~~~~~~~~~~~~~~~~~~~~~~~~~~~~  Conrad Trejo, PGY2  Pager [phone] (NS) [phone] ([facility])  After 7: Night Float Pager  ~~~~~~~~~~~~~~~~~~~~~~~~~~~~~~~~~~    PROGRESS NOTE:     Patient is a 71y old  Male who presents with a chief complaint of dizziness, ear pain (21 Dec 2021 07:02)      SUBJECTIVE / OVERNIGHT EVENTS:  -No acute events overnight  -MRI yesterday redemonstrating otitis externa, negative for acute stroke  -Orthostatics improved with IV hydration    ADDITIONAL REVIEW OF SYSTEMS:    MEDICATIONS  (STANDING):  artificial  tears Solution 1 Drop(s) Both EYES four times a day  aspirin enteric coated 81 milliGRAM(s) Oral daily  atorvastatin 40 milliGRAM(s) Oral at bedtime  budesonide 160 MICROgram(s)/formoterol 4.5 MICROgram(s) Inhaler 2 Puff(s) Inhalation two times a day  carvedilol 12.5 milliGRAM(s) Oral every 12 hours  ciprofloxacin     Tablet 500 milliGRAM(s) Oral every 12 hours  dextrose 40% Gel 15 Gram(s) Oral once  dextrose 5%. 1000 milliLiter(s) (50 mL/Hr) IV Continuous <Continuous>  dextrose 5%. 1000 milliLiter(s) (100 mL/Hr) IV Continuous <Continuous>  dextrose 50% Injectable 25 Gram(s) IV Push once  dextrose 50% Injectable 25 Gram(s) IV Push once  dextrose 50% Injectable 12.5 Gram(s) IV Push once  diphenoxylate/atropine 1 Tablet(s) Oral two times a day  enoxaparin Injectable 40 milliGRAM(s) SubCutaneous daily  erythromycin   Ointment 1 Application(s) Both EYES daily  glucagon  Injectable 1 milliGRAM(s) IntraMuscular once  insulin lispro (ADMELOG) corrective regimen sliding scale   SubCutaneous three times a day before meals  latanoprost 0.005% Ophthalmic Solution 1 Drop(s) Both EYES at bedtime  losartan 100 milliGRAM(s) Oral daily  meclizine 25 milliGRAM(s) Oral once  meclizine 25 milliGRAM(s) Oral daily  montelukast 10 milliGRAM(s) Oral at bedtime  multivitamin 1 Tablet(s) Oral daily  ofloxacin 0.3% Solution 1 Drop(s) Both EYES four times a day    MEDICATIONS  (PRN):  acetaminophen     Tablet .. 650 milliGRAM(s) Oral every 6 hours PRN Mild Pain (1 - 3), Moderate Pain (4 - 6)  ALBUTerol    90 MICROgram(s) HFA Inhaler 2 Puff(s) Inhalation every 6 hours PRN Shortness of Breath and/or Wheezing      CAPILLARY BLOOD GLUCOSE      POCT Blood Glucose.: 116 mg/dL (21 Dec 2021 21:27)  POCT Blood Glucose.: 103 mg/dL (21 Dec 2021 17:33)  POCT Blood Glucose.: 101 mg/dL (21 Dec 2021 13:16)  POCT Blood Glucose.: 110 mg/dL (21 Dec 2021 08:37)    I&O's Summary      PHYSICAL EXAM:  Vital Signs Last 24 Hrs  T(C): 36.7 (22 Dec 2021 06:00), Max: 37 (21 Dec 2021 22:55)  T(F): 98 (22 Dec 2021 06:00), Max: 98.6 (21 Dec 2021 22:55)  HR: 77 (22 Dec 2021 06:00) (77 - 89)  BP: 128/72 (22 Dec 2021 06:00) (128/72 - 154/76)  BP(mean): --  RR: 18 (22 Dec 2021 06:00) (18 - 18)  SpO2: 100% (22 Dec 2021 06:00) (99% - 100%)    GENERAL: NAD, well-developed  HEAD:  Atraumatic, Normocephalic  EYES: no pain with extraocular eye movements. PERRLA, +erythema of bilateral conjunctiva  EARS: +TTP of left ear. Increased erythema and edema of cartilage of left ear  NECK: Supple, No JVD, no lymphadenopathy, thyroid normal to palpation  CHEST/LUNG: Clear to auscultation bilaterally; No wheeze  HEART: Regular rate and rhythm; No murmurs, rubs, or gallops  ABDOMEN: Soft, Nontender, Nondistended; Bowel sounds present  EXTREMITIES:  2+ Peripheral Pulses, No clubbing, cyanosis, or edema  PSYCH: AAOx3  NEUROLOGY: CN 2-7 intact, 9-12 intact. Decreased hearing in bilateral ears. 5/5 strength in UE, LE bilaterally. Sensations equal and intact throughout. Normal finger to nose, heel to shin test.  SKIN: No rashes or lesions    LABS:                        9.2    4.83  )-----------( 540      ( 21 Dec 2021 08:12 )             33.1     12-21    136  |  105  |  13  ----------------------------<  85  3.9   |  21<L>  |  0.99    Ca    8.3<L>      21 Dec 2021 08:12  Phos  2.7     12-21  Mg     1.70     12-21                  RADIOLOGY & ADDITIONAL TESTS:  Results Reviewed:   Imaging Personally Reviewed:  Electrocardiogram Personally Reviewed:    COORDINATION OF CARE:  Care Discussed with Consultants/Other Providers [Y/N]:  Prior or Outpatient Records Reviewed [Y/N]:   ~~~~~~~~~~~~~~~~~~~~~~~~~~~~~~~~~~  Conrad Trejo, PGY2  Pager [phone] (NS) [phone] ([facility])  After 7: Night Float Pager  ~~~~~~~~~~~~~~~~~~~~~~~~~~~~~~~~~~    PROGRESS NOTE:     Patient is a 71y old  Male who presents with a chief complaint of dizziness, ear pain (21 Dec 2021 07:02)      SUBJECTIVE / OVERNIGHT EVENTS:  -No acute events overnight  -MRI yesterday redemonstrating otitis externa, negative for acute stroke  -Orthostatics improved with IV hydration  -C/o dizziness today, worse than yesterday    ADDITIONAL REVIEW OF SYSTEMS:    MEDICATIONS  (STANDING):  artificial  tears Solution 1 Drop(s) Both EYES four times a day  aspirin enteric coated 81 milliGRAM(s) Oral daily  atorvastatin 40 milliGRAM(s) Oral at bedtime  budesonide 160 MICROgram(s)/formoterol 4.5 MICROgram(s) Inhaler 2 Puff(s) Inhalation two times a day  carvedilol 12.5 milliGRAM(s) Oral every 12 hours  ciprofloxacin     Tablet 500 milliGRAM(s) Oral every 12 hours  dextrose 40% Gel 15 Gram(s) Oral once  dextrose 5%. 1000 milliLiter(s) (50 mL/Hr) IV Continuous <Continuous>  dextrose 5%. 1000 milliLiter(s) (100 mL/Hr) IV Continuous <Continuous>  dextrose 50% Injectable 25 Gram(s) IV Push once  dextrose 50% Injectable 25 Gram(s) IV Push once  dextrose 50% Injectable 12.5 Gram(s) IV Push once  diphenoxylate/atropine 1 Tablet(s) Oral two times a day  enoxaparin Injectable 40 milliGRAM(s) SubCutaneous daily  erythromycin   Ointment 1 Application(s) Both EYES daily  glucagon  Injectable 1 milliGRAM(s) IntraMuscular once  insulin lispro (ADMELOG) corrective regimen sliding scale   SubCutaneous three times a day before meals  latanoprost 0.005% Ophthalmic Solution 1 Drop(s) Both EYES at bedtime  losartan 100 milliGRAM(s) Oral daily  meclizine 25 milliGRAM(s) Oral once  meclizine 25 milliGRAM(s) Oral daily  montelukast 10 milliGRAM(s) Oral at bedtime  multivitamin 1 Tablet(s) Oral daily  ofloxacin 0.3% Solution 1 Drop(s) Both EYES four times a day    MEDICATIONS  (PRN):  acetaminophen     Tablet .. 650 milliGRAM(s) Oral every 6 hours PRN Mild Pain (1 - 3), Moderate Pain (4 - 6)  ALBUTerol    90 MICROgram(s) HFA Inhaler 2 Puff(s) Inhalation every 6 hours PRN Shortness of Breath and/or Wheezing      CAPILLARY BLOOD GLUCOSE      POCT Blood Glucose.: 116 mg/dL (21 Dec 2021 21:27)  POCT Blood Glucose.: 103 mg/dL (21 Dec 2021 17:33)  POCT Blood Glucose.: 101 mg/dL (21 Dec 2021 13:16)  POCT Blood Glucose.: 110 mg/dL (21 Dec 2021 08:37)    I&O's Summary      PHYSICAL EXAM:  Vital Signs Last 24 Hrs  T(C): 36.7 (22 Dec 2021 06:00), Max: 37 (21 Dec 2021 22:55)  T(F): 98 (22 Dec 2021 06:00), Max: 98.6 (21 Dec 2021 22:55)  HR: 77 (22 Dec 2021 06:00) (77 - 89)  BP: 128/72 (22 Dec 2021 06:00) (128/72 - 154/76)  BP(mean): --  RR: 18 (22 Dec 2021 06:00) (18 - 18)  SpO2: 100% (22 Dec 2021 06:00) (99% - 100%)    GENERAL: NAD, well-developed  HEAD:  Atraumatic, Normocephalic  EYES: no pain with extraocular eye movements. PERRLA, +erythema of bilateral conjunctiva  EARS: +TTP of left ear. Increased erythema and edema of cartilage of left ear  NECK: Supple, No JVD, no lymphadenopathy, thyroid normal to palpation  CHEST/LUNG: Clear to auscultation bilaterally; No wheeze  HEART: Regular rate and rhythm; No murmurs, rubs, or gallops  ABDOMEN: Soft, Nontender, Nondistended; Bowel sounds present  EXTREMITIES:  2+ Peripheral Pulses, No clubbing, cyanosis, or edema  PSYCH: AAOx3  NEUROLOGY: CN 2-7 intact, 9-12 intact. Decreased hearing in bilateral ears. 5/5 strength in UE, LE bilaterally. Sensations equal and intact throughout. Normal finger to nose, heel to shin test.  SKIN: No rashes or lesions    LABS:                        9.2    4.83  )-----------( 540      ( 21 Dec 2021 08:12 )             33.1     12-21    136  |  105  |  13  ----------------------------<  85  3.9   |  21<L>  |  0.99    Ca    8.3<L>      21 Dec 2021 08:12  Phos  2.7     12-21  Mg     1.70     12-21                  RADIOLOGY & ADDITIONAL TESTS:  Results Reviewed:   Imaging Personally Reviewed:  Electrocardiogram Personally Reviewed:    COORDINATION OF CARE:  Care Discussed with Consultants/Other Providers [Y/N]:  Prior or Outpatient Records Reviewed [Y/N]:

## 2021-12-23 LAB
ANION GAP SERPL CALC-SCNC: 9 MMOL/L — SIGNIFICANT CHANGE UP (ref 7–14)
BUN SERPL-MCNC: 12 MG/DL — SIGNIFICANT CHANGE UP (ref 7–23)
CALCIUM SERPL-MCNC: 8.4 MG/DL — SIGNIFICANT CHANGE UP (ref 8.4–10.5)
CHLORIDE SERPL-SCNC: 104 MMOL/L — SIGNIFICANT CHANGE UP (ref 98–107)
CO2 SERPL-SCNC: 22 MMOL/L — SIGNIFICANT CHANGE UP (ref 22–31)
CREAT SERPL-MCNC: 0.91 MG/DL — SIGNIFICANT CHANGE UP (ref 0.5–1.3)
GLUCOSE BLDC GLUCOMTR-MCNC: 134 MG/DL — HIGH (ref 70–99)
GLUCOSE BLDC GLUCOMTR-MCNC: 97 MG/DL — SIGNIFICANT CHANGE UP (ref 70–99)
GLUCOSE SERPL-MCNC: 83 MG/DL — SIGNIFICANT CHANGE UP (ref 70–99)
HCT VFR BLD CALC: 30.7 % — LOW (ref 39–50)
HGB BLD-MCNC: 8.4 G/DL — LOW (ref 13–17)
MAGNESIUM SERPL-MCNC: 1.8 MG/DL — SIGNIFICANT CHANGE UP (ref 1.6–2.6)
MCHC RBC-ENTMCNC: 19.8 PG — LOW (ref 27–34)
MCHC RBC-ENTMCNC: 27.4 GM/DL — LOW (ref 32–36)
MCV RBC AUTO: 72.2 FL — LOW (ref 80–100)
NRBC # BLD: 0 /100 WBCS — SIGNIFICANT CHANGE UP
NRBC # FLD: 0 K/UL — SIGNIFICANT CHANGE UP
PHOSPHATE SERPL-MCNC: 2.6 MG/DL — SIGNIFICANT CHANGE UP (ref 2.5–4.5)
PLATELET # BLD AUTO: 493 K/UL — HIGH (ref 150–400)
POTASSIUM SERPL-MCNC: 3.7 MMOL/L — SIGNIFICANT CHANGE UP (ref 3.5–5.3)
POTASSIUM SERPL-SCNC: 3.7 MMOL/L — SIGNIFICANT CHANGE UP (ref 3.5–5.3)
RBC # BLD: 4.25 M/UL — SIGNIFICANT CHANGE UP (ref 4.2–5.8)
RBC # FLD: 19.8 % — HIGH (ref 10.3–14.5)
SARS-COV-2 RNA SPEC QL NAA+PROBE: SIGNIFICANT CHANGE UP
SODIUM SERPL-SCNC: 135 MMOL/L — SIGNIFICANT CHANGE UP (ref 135–145)
WBC # BLD: 5.3 K/UL — SIGNIFICANT CHANGE UP (ref 3.8–10.5)
WBC # FLD AUTO: 5.3 K/UL — SIGNIFICANT CHANGE UP (ref 3.8–10.5)

## 2021-12-23 PROCEDURE — 99232 SBSQ HOSP IP/OBS MODERATE 35: CPT | Mod: GC

## 2021-12-23 RX ORDER — MECLIZINE HCL 12.5 MG
25 TABLET ORAL THREE TIMES A DAY
Refills: 0 | Status: DISCONTINUED | OUTPATIENT
Start: 2021-12-23 | End: 2021-12-26

## 2021-12-23 RX ORDER — MAGNESIUM OXIDE 400 MG ORAL TABLET 241.3 MG
400 TABLET ORAL ONCE
Refills: 0 | Status: COMPLETED | OUTPATIENT
Start: 2021-12-23 | End: 2021-12-29

## 2021-12-23 RX ORDER — SODIUM,POTASSIUM PHOSPHATES 278-250MG
1 POWDER IN PACKET (EA) ORAL
Refills: 0 | Status: COMPLETED | OUTPATIENT
Start: 2021-12-23 | End: 2021-12-24

## 2021-12-23 RX ORDER — DIPHENOXYLATE HCL/ATROPINE 2.5-.025MG
1 TABLET ORAL
Refills: 0 | Status: DISCONTINUED | OUTPATIENT
Start: 2021-12-23 | End: 2021-12-30

## 2021-12-23 RX ADMIN — Medication 650 MILLIGRAM(S): at 23:27

## 2021-12-23 RX ADMIN — LATANOPROST 1 DROP(S): 0.05 SOLUTION/ DROPS OPHTHALMIC; TOPICAL at 22:26

## 2021-12-23 RX ADMIN — Medication 650 MILLIGRAM(S): at 22:27

## 2021-12-23 RX ADMIN — Medication 1 TABLET(S): at 19:07

## 2021-12-23 RX ADMIN — BUDESONIDE AND FORMOTEROL FUMARATE DIHYDRATE 2 PUFF(S): 160; 4.5 AEROSOL RESPIRATORY (INHALATION) at 13:46

## 2021-12-23 RX ADMIN — Medication 650 MILLIGRAM(S): at 15:39

## 2021-12-23 RX ADMIN — Medication 1 DROP(S): at 18:47

## 2021-12-23 RX ADMIN — Medication 500 MILLIGRAM(S): at 05:31

## 2021-12-23 RX ADMIN — Medication 1 APPLICATION(S): at 13:46

## 2021-12-23 RX ADMIN — Medication 81 MILLIGRAM(S): at 13:45

## 2021-12-23 RX ADMIN — MONTELUKAST 10 MILLIGRAM(S): 4 TABLET, CHEWABLE ORAL at 22:26

## 2021-12-23 RX ADMIN — Medication 25 MILLIGRAM(S): at 18:49

## 2021-12-23 RX ADMIN — Medication 12.5 MILLIGRAM(S): at 05:31

## 2021-12-23 RX ADMIN — CARVEDILOL PHOSPHATE 12.5 MILLIGRAM(S): 80 CAPSULE, EXTENDED RELEASE ORAL at 18:50

## 2021-12-23 RX ADMIN — Medication 1 TABLET(S): at 07:36

## 2021-12-23 RX ADMIN — BUDESONIDE AND FORMOTEROL FUMARATE DIHYDRATE 2 PUFF(S): 160; 4.5 AEROSOL RESPIRATORY (INHALATION) at 22:26

## 2021-12-23 RX ADMIN — Medication 1 DROP(S): at 05:31

## 2021-12-23 RX ADMIN — Medication 1 PACKET(S): at 19:07

## 2021-12-23 RX ADMIN — Medication 1 TABLET(S): at 13:44

## 2021-12-23 RX ADMIN — ENOXAPARIN SODIUM 40 MILLIGRAM(S): 100 INJECTION SUBCUTANEOUS at 13:44

## 2021-12-23 RX ADMIN — Medication 650 MILLIGRAM(S): at 16:27

## 2021-12-23 RX ADMIN — Medication 1 DROP(S): at 15:36

## 2021-12-23 RX ADMIN — ATORVASTATIN CALCIUM 40 MILLIGRAM(S): 80 TABLET, FILM COATED ORAL at 22:26

## 2021-12-23 RX ADMIN — Medication 500 MILLIGRAM(S): at 18:48

## 2021-12-23 RX ADMIN — Medication 1 DROP(S): at 13:46

## 2021-12-23 NOTE — DIETITIAN INITIAL EVALUATION ADULT. - ADD RECOMMEND
3) Encouraged PO intake as tolerated. Discussed alternative menu items obtained food preferences and will provide as able.                                                                                                        4) May continue multivitamin to provide micronutrient coverage.                                                                                                         5) Monitor PO intake, tolerance to diet, weight trends, BMs/GI distress, nutrition related lab values, hydration status, skin integrity.

## 2021-12-23 NOTE — PROGRESS NOTE ADULT - SUBJECTIVE AND OBJECTIVE BOX
~~~~~~~~~~~~~~~~~~~~~~~~~~~~~~~~~~  Conrad Trejo, PGY2  Pager [phone] (NS) [phone] ([facility])  After 7: Night Float Pager  ~~~~~~~~~~~~~~~~~~~~~~~~~~~~~~~~~~    PROGRESS NOTE:     Patient is a 71y old  Male who presents with a chief complaint of dizziness, ear pain (22 Dec 2021 07:51)      SUBJECTIVE / OVERNIGHT EVENTS:  -C/o dizzy episode last night  -Still feels unstable on feet  -Asking for SW and PT eval    ADDITIONAL REVIEW OF SYSTEMS:    MEDICATIONS  (STANDING):  artificial  tears Solution 1 Drop(s) Both EYES four times a day  aspirin enteric coated 81 milliGRAM(s) Oral daily  atorvastatin 40 milliGRAM(s) Oral at bedtime  budesonide 160 MICROgram(s)/formoterol 4.5 MICROgram(s) Inhaler 2 Puff(s) Inhalation two times a day  carvedilol 12.5 milliGRAM(s) Oral every 12 hours  ciprofloxacin     Tablet 500 milliGRAM(s) Oral every 12 hours  dextrose 40% Gel 15 Gram(s) Oral once  dextrose 5%. 1000 milliLiter(s) (50 mL/Hr) IV Continuous <Continuous>  dextrose 5%. 1000 milliLiter(s) (100 mL/Hr) IV Continuous <Continuous>  dextrose 50% Injectable 25 Gram(s) IV Push once  dextrose 50% Injectable 12.5 Gram(s) IV Push once  dextrose 50% Injectable 25 Gram(s) IV Push once  diphenoxylate/atropine 1 Tablet(s) Oral two times a day  enoxaparin Injectable 40 milliGRAM(s) SubCutaneous daily  erythromycin   Ointment 1 Application(s) Both EYES daily  glucagon  Injectable 1 milliGRAM(s) IntraMuscular once  insulin lispro (ADMELOG) corrective regimen sliding scale   SubCutaneous three times a day before meals  latanoprost 0.005% Ophthalmic Solution 1 Drop(s) Both EYES at bedtime  losartan 100 milliGRAM(s) Oral daily  magnesium oxide 400 milliGRAM(s) Oral once  meclizine 12.5 milliGRAM(s) Oral once  meclizine 12.5 milliGRAM(s) Oral three times a day  montelukast 10 milliGRAM(s) Oral at bedtime  multivitamin 1 Tablet(s) Oral daily  ofloxacin 0.3% Solution 1 Drop(s) Both EYES four times a day  potassium phosphate / sodium phosphate Powder (PHOS-NaK) 1 Packet(s) Oral two times a day    MEDICATIONS  (PRN):  acetaminophen     Tablet .. 650 milliGRAM(s) Oral every 6 hours PRN Mild Pain (1 - 3), Moderate Pain (4 - 6)  ALBUTerol    90 MICROgram(s) HFA Inhaler 2 Puff(s) Inhalation every 6 hours PRN Shortness of Breath and/or Wheezing      CAPILLARY BLOOD GLUCOSE      POCT Blood Glucose.: 111 mg/dL (22 Dec 2021 18:19)  POCT Blood Glucose.: 94 mg/dL (22 Dec 2021 12:31)  POCT Blood Glucose.: 115 mg/dL (22 Dec 2021 08:52)    I&O's Summary      PHYSICAL EXAM:  Vital Signs Last 24 Hrs  T(C): 36.7 (22 Dec 2021 21:39), Max: 37.2 (22 Dec 2021 13:56)  T(F): 98 (22 Dec 2021 21:39), Max: 99 (22 Dec 2021 13:56)  HR: 87 (23 Dec 2021 05:28) (69 - 87)  BP: 105/57 (23 Dec 2021 05:28) (105/57 - 115/85)  BP(mean): --  RR: 18 (23 Dec 2021 05:28) (18 - 19)  SpO2: 98% (23 Dec 2021 05:28) (98% - 100%)    CONSTITUTIONAL: NAD, well-developed  RESPIRATORY: Normal respiratory effort; lungs are clear to auscultation bilaterally  HEENT: Bilateral conjuctival injection, L. pinna with improved TTP  CARDIOVASCULAR: Regular rate and rhythm, normal S1 and S2, no murmur/rub/gallop; No lower extremity edema; Peripheral pulses are 2+ bilaterally  ABDOMEN: Nontender to palpation, normoactive bowel sounds, no rebound/guarding; No hepatosplenomegaly  MUSCLOSKELETAL: no clubbing or cyanosis of digits; no joint swelling or tenderness to palpation  PSYCH: A+O to person, place, and time; affect appropriate    LABS:                        8.4    5.30  )-----------( 493      ( 23 Dec 2021 07:18 )             30.7     12-23    135  |  104  |  12  ----------------------------<  83  3.7   |  22  |  0.91    Ca    8.4      23 Dec 2021 07:18  Phos  2.6     12-23  Mg     1.80     12-23    TPro  7.9  /  Alb  2.6<L>  /  TBili  0.4  /  DBili  x   /  AST  26  /  ALT  18  /  AlkPhos  102  12-22                RADIOLOGY & ADDITIONAL TESTS:  Results Reviewed:   Imaging Personally Reviewed:  Electrocardiogram Personally Reviewed:    COORDINATION OF CARE:  Care Discussed with Consultants/Other Providers [Y/N]:  Prior or Outpatient Records Reviewed [Y/N]:

## 2021-12-23 NOTE — DIETITIAN INITIAL EVALUATION ADULT. - PERTINENT LABORATORY DATA
(12/23) Na 135, BUN 12, Cr 0.91, BG 83, K+ 3.7, Phos 2.6, Mg 1.80.  (12/18) HbA1c 5.7%<>.  POCT: (12/22)

## 2021-12-23 NOTE — PROGRESS NOTE ADULT - ATTENDING COMMENTS
70 yo M PMHx of COPD, Asthma, Ulcerative Colitis, DM and HTN admitted for otitis externa, b/l bacterial conjunctivitis and dizziness.     -mri findings reviewed- negative for CVA or masses, left external otitis present  -still with dizziness today, likely in setting of residual infection, c/w meclizine, will increase to 25mg TID  -c/w Cipro through 12/27  -c/w ofloxacin and erythromycin for conjunctivitis  -orthostatics positive on 12/20, s/p IVF, repeat orthostatics negative  -speech and swallow eval noted- patient still c/o sensation of pills getting stuck after swallowing, outpatient GI follow up  -PT recommending rehab- SW following    D/w Dr. Trejo

## 2021-12-23 NOTE — PROGRESS NOTE ADULT - SUBJECTIVE AND OBJECTIVE BOX
[facility] DEPARTMENT OF OPHTHALMOLOGY  ------------------------------------------------------------------------------  Ronald Junior MD PGY 3  Pager: [phone]  ------------------------------------------------------------------------------    Interval History: No acute events overnight. Today patient reporting improvement in eye irritation with the artificial tears. Patient denying blurred vision,  flashes, floaters, FBS, erythema, or discharge. Patient reports intermittent ache feeling in eye, but overall improvement     MEDICATIONS  (STANDING):  artificial  tears Solution 1 Drop(s) Both EYES four times a day  aspirin enteric coated 81 milliGRAM(s) Oral daily  atorvastatin 40 milliGRAM(s) Oral at bedtime  budesonide 160 MICROgram(s)/formoterol 4.5 MICROgram(s) Inhaler 2 Puff(s) Inhalation two times a day  carvedilol 12.5 milliGRAM(s) Oral every 12 hours  ciprofloxacin     Tablet 500 milliGRAM(s) Oral every 12 hours  diphenoxylate/atropine 1 Tablet(s) Oral two times a day  enoxaparin Injectable 40 milliGRAM(s) SubCutaneous daily  erythromycin   Ointment 1 Application(s) Both EYES daily  latanoprost 0.005% Ophthalmic Solution 1 Drop(s) Both EYES at bedtime  losartan 100 milliGRAM(s) Oral daily  magnesium oxide 400 milliGRAM(s) Oral once  meclizine 25 milliGRAM(s) Oral three times a day  montelukast 10 milliGRAM(s) Oral at bedtime  multivitamin 1 Tablet(s) Oral daily  ofloxacin 0.3% Solution 1 Drop(s) Both EYES four times a day  potassium phosphate / sodium phosphate Powder (PHOS-NaK) 1 Packet(s) Oral two times a day    MEDICATIONS  (PRN):  acetaminophen     Tablet .. 650 milliGRAM(s) Oral every 6 hours PRN Mild Pain (1 - 3), Moderate Pain (4 - 6)  ALBUTerol    90 MICROgram(s) HFA Inhaler 2 Puff(s) Inhalation every 6 hours PRN Shortness of Breath and/or Wheezing      VITALS: T(C): 36.8 (12-23-21 @ 21:28)  T(F): 98.2 (12-23-21 @ 21:28), Max: 98.2 (12-23-21 @ 13:55)  HR: 88 (12-23-21 @ 21:28) (86 - 88)  BP: 113/72 (12-23-21 @ 21:28) (105/57 - 148/92)  RR:  (18 - 20)  SpO2:  (98% - 100%)  Wt(kg): --  General: AAO x 3, appropriate mood and affect    Ophthalmology Exam:  Visual acuity (cc): 20/25 OU  Pupils: PERRL OU, no APD  Ttono: 18, 29  Extraocular movements (EOMs): Full OU, no pain, no diplopia  Confrontational Visual Field (CVF): Full OU  Color Plates: 12/12 OU  No resistance to retropulsion OU, Trace proptosis Left eye    Pen Light Exam (PLE)  External: Flat OU  Lids/Lashes/Lacrimal Ducts: ectropion OD>OS. Scant discharge medially with possible small focal collection medial inferior lid OD.   Sclera/Conjunctiva: 1-2+ injection OS> OD with Cork-screw like vessels noted on superior conjunctiva OS                                                                                                                                                                                                                                                                                                                                                                                                                                                                                                                                                                                Cornea: Cl OU  Anterior Chamber: D+F OU    Iris: Flat OU  Lens: NS OU

## 2021-12-23 NOTE — PROGRESS NOTE ADULT - PROBLEM SELECTOR PLAN 2
- pt reports dizziness with rest and worse with ambulating  - dizziness possibly 2/2 inner ear etiology   - Neuro following  - continue asa/statin   - aspiration/fall/safety precautions  - c/w meclizine 12.5mg TID as patient reports improvement with this  - TTE, and MR negative for cardioembolic etiology  - unclear history of afib. Patient denies. Will f/u with outpatient PCP. No events on tele  - + orthostatics, s/p IV hydration, repeat wnl - pt reports dizziness with rest and worse with ambulating  - dizziness possibly 2/2 inner ear etiology   - Neuro following  - continue asa/statin   - aspiration/fall/safety precautions  - c/w meclizine 25mg TID as patient reports improvement with this  - TTE, and MR negative for cardioembolic etiology  - unclear history of afib. Patient denies. Will f/u with outpatient PCP. No events on tele  - + orthostatics, s/p IV hydration, repeat wnl

## 2021-12-23 NOTE — DIETITIAN INITIAL EVALUATION ADULT. - OTHER INFO
- Pt confirms NKFA; lives at home with wife who assists with meal preparation.  - History of type 2 DM, not on medications PTA, HbA1c (12/18) 5.7%. Fingersticks WDL in house.   - Pt denies chewing difficulties, however endorses sensation of food stuck in throat/moving slowly. Noted with coughing after water intake. S/p bedside swallow assessment (12/20) recommending GI consult and feeding techniques (slow pace, small bites/sips, sitting upright, alternating liquids/solids). Pt denies difficulties with PO intake PTA, reports tolerating regular diet with generally good appetite.   - Pt denies GI distress, last BM 12/21 per flowsheets, no bowel regimen ordered at this time.   - Pt endorses unintentional progressive weight loss; apparent clinically significant 41 lb (21%) weight loss x10 months, per history obtained via chart: (12/18) 130 lbs, (10/7) 154 lbs, (8/26) 156 lbs, (4/15) 166 lbs, (2/24) 171 lbs.

## 2021-12-23 NOTE — DIETITIAN INITIAL EVALUATION ADULT. - CALCULATED TO (CAL/KG)
"Patient calls in and states that her insurance is not covering the librium.  She states that ""I don't get the medication I will go into benzodiazepine withdrawal\"" please advise  " [id number]

## 2021-12-23 NOTE — PROGRESS NOTE ADULT - ASSESSMENT
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 Ophthalmology consulted for eye redness OU Patient reports improvement, but today with persistent redness, cork-screw vessels, and elevated IOP    # Eye redness both eyes  - Etiology previously presumed dryness/irritation- given dry eye appearance with ectropion and improvement with drops  - Today with notable persistent injection and cork-screw vessels with elevated IOP Left 29   - Given persistent findings, worsening IOP - Previous scan with notable proptosis - will recommend CTA/MRA brain, Orbits to rule out CCF.   - Patient with no orbital signs - PERRL, EOM full, no resistance to retropulsion  - Not emergency at this time but recommend imaging  - Will recommend to start additional eye pressure lowering drop in the left eye - Timolol BID Left eye  - cw ofloxacin gtts QID, Erythromycin ointment qHS 5-7 days-  - Cw Artificial tears 4 times a day Both eyes  - Cw Warm compress TID    # glaucoma suspect vs. ocular HTN  - follows with Dr. Anil Crystal, last HVF, IOP reportedly normal per pt  - continue xalatan QHS both eyes - Start on timolol BID Left eye as discussed above.     Discussed with primary team and patient   Dw Tigre Bartlett    Outpatient follow-up: Patient should follow-up with his/her ophthalmologist or with [facility] Department of Ophthalmology at the address below     [address] [place] [address]. [address]  [place] [address]  [phone]

## 2021-12-23 NOTE — DIETITIAN NUTRITION RISK NOTIFICATION - ADDITIONAL COMMENTS/DIETITIAN RECOMMENDATIONS
Please see Dietitian Initial Assessment for complete recommendations.  Lindsey Bender, DAWSONN, CDN #[id number]

## 2021-12-23 NOTE — DIETITIAN INITIAL EVALUATION ADULT. - REASON FOR ADMISSION
Per chart, pt is 71 yea old male PMHx COPD, asthma, ?atrial fibrillation, UC, type 2 DM, HTN presenting with L ear pain x1 month presenting with inability to ambulate secondary to dizziness, headache, bilateral eye erythema. CT IAC concerning for necrotizing otitis externa. MR negative for acute stroke and internal auditory canal involvement.

## 2021-12-23 NOTE — DIETITIAN INITIAL EVALUATION ADULT. - PERTINENT MEDS FT
atorvastatin, ADMELOG corrective regimen sliding scale, magnesium oxide, multivitamin, potassium phosphate / sodium phosphate Powder

## 2021-12-24 LAB
ANION GAP SERPL CALC-SCNC: 14 MMOL/L — SIGNIFICANT CHANGE UP (ref 7–14)
BASOPHILS # BLD AUTO: 0.02 K/UL — SIGNIFICANT CHANGE UP (ref 0–0.2)
BASOPHILS NFR BLD AUTO: 0.3 % — SIGNIFICANT CHANGE UP (ref 0–2)
BUN SERPL-MCNC: 14 MG/DL — SIGNIFICANT CHANGE UP (ref 7–23)
CALCIUM SERPL-MCNC: 8.4 MG/DL — SIGNIFICANT CHANGE UP (ref 8.4–10.5)
CHLORIDE SERPL-SCNC: 103 MMOL/L — SIGNIFICANT CHANGE UP (ref 98–107)
CO2 SERPL-SCNC: 20 MMOL/L — LOW (ref 22–31)
CREAT SERPL-MCNC: 1.05 MG/DL — SIGNIFICANT CHANGE UP (ref 0.5–1.3)
EOSINOPHIL # BLD AUTO: 0.23 K/UL — SIGNIFICANT CHANGE UP (ref 0–0.5)
EOSINOPHIL NFR BLD AUTO: 3.9 % — SIGNIFICANT CHANGE UP (ref 0–6)
GLUCOSE SERPL-MCNC: 78 MG/DL — SIGNIFICANT CHANGE UP (ref 70–99)
HCT VFR BLD CALC: 30.3 % — LOW (ref 39–50)
HGB BLD-MCNC: 8.6 G/DL — LOW (ref 13–17)
IANC: 3.15 K/UL — SIGNIFICANT CHANGE UP (ref 1.5–8.5)
IMM GRANULOCYTES NFR BLD AUTO: 0.5 % — SIGNIFICANT CHANGE UP (ref 0–1.5)
LYMPHOCYTES # BLD AUTO: 1.29 K/UL — SIGNIFICANT CHANGE UP (ref 1–3.3)
LYMPHOCYTES # BLD AUTO: 22.1 % — SIGNIFICANT CHANGE UP (ref 13–44)
MAGNESIUM SERPL-MCNC: 1.7 MG/DL — SIGNIFICANT CHANGE UP (ref 1.6–2.6)
MCHC RBC-ENTMCNC: 20.2 PG — LOW (ref 27–34)
MCHC RBC-ENTMCNC: 28.4 GM/DL — LOW (ref 32–36)
MCV RBC AUTO: 71.1 FL — LOW (ref 80–100)
MONOCYTES # BLD AUTO: 1.11 K/UL — HIGH (ref 0–0.9)
MONOCYTES NFR BLD AUTO: 19 % — HIGH (ref 2–14)
NEUTROPHILS # BLD AUTO: 3.15 K/UL — SIGNIFICANT CHANGE UP (ref 1.8–7.4)
NEUTROPHILS NFR BLD AUTO: 54.2 % — SIGNIFICANT CHANGE UP (ref 43–77)
NRBC # BLD: 0 /100 WBCS — SIGNIFICANT CHANGE UP
NRBC # FLD: 0 K/UL — SIGNIFICANT CHANGE UP
PHOSPHATE SERPL-MCNC: 2.8 MG/DL — SIGNIFICANT CHANGE UP (ref 2.5–4.5)
PLATELET # BLD AUTO: 445 K/UL — HIGH (ref 150–400)
POTASSIUM SERPL-MCNC: 4 MMOL/L — SIGNIFICANT CHANGE UP (ref 3.5–5.3)
POTASSIUM SERPL-SCNC: 4 MMOL/L — SIGNIFICANT CHANGE UP (ref 3.5–5.3)
RBC # BLD: 4.26 M/UL — SIGNIFICANT CHANGE UP (ref 4.2–5.8)
RBC # FLD: 20.2 % — HIGH (ref 10.3–14.5)
SODIUM SERPL-SCNC: 137 MMOL/L — SIGNIFICANT CHANGE UP (ref 135–145)
WBC # BLD: 5.83 K/UL — SIGNIFICANT CHANGE UP (ref 3.8–10.5)
WBC # FLD AUTO: 5.83 K/UL — SIGNIFICANT CHANGE UP (ref 3.8–10.5)

## 2021-12-24 PROCEDURE — 99232 SBSQ HOSP IP/OBS MODERATE 35: CPT | Mod: GC

## 2021-12-24 RX ORDER — ACETAMINOPHEN 500 MG
1000 TABLET ORAL ONCE
Refills: 0 | Status: COMPLETED | OUTPATIENT
Start: 2021-12-24 | End: 2021-12-24

## 2021-12-24 RX ORDER — NITROGLYCERIN 6.5 MG
1 CAPSULE, EXTENDED RELEASE ORAL
Refills: 0 | Status: DISCONTINUED | OUTPATIENT
Start: 2021-12-24 | End: 2021-12-24

## 2021-12-24 RX ORDER — TIMOLOL 0.5 %
1 DROPS OPHTHALMIC (EYE) EVERY 12 HOURS
Refills: 0 | Status: DISCONTINUED | OUTPATIENT
Start: 2021-12-24 | End: 2021-12-28

## 2021-12-24 RX ORDER — ACETAMINOPHEN 500 MG
500 TABLET ORAL ONCE
Refills: 0 | Status: COMPLETED | OUTPATIENT
Start: 2021-12-24 | End: 2021-12-24

## 2021-12-24 RX ADMIN — ATORVASTATIN CALCIUM 40 MILLIGRAM(S): 80 TABLET, FILM COATED ORAL at 21:37

## 2021-12-24 RX ADMIN — Medication 650 MILLIGRAM(S): at 12:56

## 2021-12-24 RX ADMIN — Medication 1 DROP(S): at 12:58

## 2021-12-24 RX ADMIN — Medication 1 DROP(S): at 23:21

## 2021-12-24 RX ADMIN — BUDESONIDE AND FORMOTEROL FUMARATE DIHYDRATE 2 PUFF(S): 160; 4.5 AEROSOL RESPIRATORY (INHALATION) at 21:36

## 2021-12-24 RX ADMIN — MONTELUKAST 10 MILLIGRAM(S): 4 TABLET, CHEWABLE ORAL at 21:37

## 2021-12-24 RX ADMIN — Medication 500 MILLIGRAM(S): at 06:43

## 2021-12-24 RX ADMIN — Medication 1 DROP(S): at 06:45

## 2021-12-24 RX ADMIN — Medication 25 MILLIGRAM(S): at 06:43

## 2021-12-24 RX ADMIN — Medication 25 MILLIGRAM(S): at 14:26

## 2021-12-24 RX ADMIN — Medication 1 DROP(S): at 00:17

## 2021-12-24 RX ADMIN — Medication 500 MILLIGRAM(S): at 18:22

## 2021-12-24 RX ADMIN — Medication 1 DROP(S): at 23:20

## 2021-12-24 RX ADMIN — Medication 25 MILLIGRAM(S): at 21:37

## 2021-12-24 RX ADMIN — Medication 1 APPLICATION(S): at 12:57

## 2021-12-24 RX ADMIN — BUDESONIDE AND FORMOTEROL FUMARATE DIHYDRATE 2 PUFF(S): 160; 4.5 AEROSOL RESPIRATORY (INHALATION) at 12:57

## 2021-12-24 RX ADMIN — ENOXAPARIN SODIUM 40 MILLIGRAM(S): 100 INJECTION SUBCUTANEOUS at 12:57

## 2021-12-24 RX ADMIN — Medication 1 PACKET(S): at 06:44

## 2021-12-24 RX ADMIN — Medication 650 MILLIGRAM(S): at 06:50

## 2021-12-24 RX ADMIN — Medication 650 MILLIGRAM(S): at 13:50

## 2021-12-24 RX ADMIN — Medication 81 MILLIGRAM(S): at 12:57

## 2021-12-24 RX ADMIN — CARVEDILOL PHOSPHATE 12.5 MILLIGRAM(S): 80 CAPSULE, EXTENDED RELEASE ORAL at 06:42

## 2021-12-24 RX ADMIN — Medication 1 TABLET(S): at 12:58

## 2021-12-24 RX ADMIN — Medication 400 MILLIGRAM(S): at 23:21

## 2021-12-24 RX ADMIN — Medication 1 DROP(S): at 00:14

## 2021-12-24 RX ADMIN — Medication 1 TABLET(S): at 06:50

## 2021-12-24 RX ADMIN — Medication 1000 MILLIGRAM(S): at 23:36

## 2021-12-24 RX ADMIN — LOSARTAN POTASSIUM 100 MILLIGRAM(S): 100 TABLET, FILM COATED ORAL at 06:42

## 2021-12-24 RX ADMIN — Medication 200 MILLIGRAM(S): at 18:07

## 2021-12-24 RX ADMIN — Medication 25 MILLIGRAM(S): at 00:14

## 2021-12-24 RX ADMIN — LATANOPROST 1 DROP(S): 0.05 SOLUTION/ DROPS OPHTHALMIC; TOPICAL at 21:36

## 2021-12-24 NOTE — PROGRESS NOTE ADULT - PROBLEM SELECTOR PLAN 2
- pt reports dizziness with rest and worse with ambulating  - dizziness possibly 2/2 inner ear etiology   - Neuro following  - continue asa/statin   - aspiration/fall/safety precautions  - c/w meclizine 25mg TID as patient reports improvement with this  - TTE, and MR negative for cardioembolic etiology  - unclear history of afib. Patient denies. Will f/u with outpatient PCP. No events on tele  - + orthostatics, s/p IV hydration, repeat wnl  - Outpatient vestibular rehab

## 2021-12-24 NOTE — CONSULT NOTE ADULT - ASSESSMENT
Impression:  72 yo M w/ PMHx COPD, afib, UC, DM, HTN admitted 12/18/21 w/ necrotizing otitis media, found to also have bacterial conjunctivitis; GI now consulted for dysphagia    # dysphagia - unclear etiology. Potential sources for dysphagia include obstructive etiologies and propulsion etiologies. Among obstructive etiologies, such as mass, stricture, ring - would expect more prolonged course, as well as progression of solids -> liquids. Zenkers possible as well. Dysmotility is the other class of disorders that may explain his dysphagia, such as achalasia vs other disorder of peristalsis. His recent MRI brain/head from 12/21/21 notable for nonspecific abnormal white matter foci likely from microvascular disease, and unlikely to explain current symptoms.     # otitis media  # conjunctivitis  # UC - previously on remicade, follows w/ Dr. Purvis, last colonoscopy 11/2019 w/ severe inflammation and stricture at 30cm    Recommendations:  - liquid diet  - SLP evaluation  - barium esophagram  - plan for EGD, likely monday 12/27/21  - pending above workup, may benefit from manometry  - rest of care per primary team    GI will continue to follow.     Billy Arboleda, PGY5  Gastroenterology/Hepatology Fellow  Available on Microsoft Teams  [phone] ([place] Short Range Pager)  [phone] (Long Range Pager)    After 5pm, please contact the on-call GI fellow. [phone]

## 2021-12-24 NOTE — PROGRESS NOTE ADULT - PROBLEM SELECTOR PLAN 4
- pt with bilateral conjunctivitis   - no purulent eye discharge   - CT showing orbital proptosis - TSH wnl  - will continue latanoprost eye drops  - erythromycin + Ofloxacin per Optho  - R/o [facility] with CT orbit  - timolol added for increase IOP of left eye

## 2021-12-24 NOTE — PROGRESS NOTE ADULT - PROBLEM SELECTOR PLAN 1
- pt reports left ear pain and swelling x 1 month  - pt is s/p PO bactrim outpatient   - CT concerning for necrotizing otitis externa, MR negative for IAC involvement  - pt is s/p IV Cipro and IV clinda in ER  - ENT following, rec Ciprofloxacin for chondritis to cover for pseudomonas  - Will continue cipro, changed to PO, continue for 10d course through 12/27

## 2021-12-24 NOTE — CONSULT NOTE ADULT - SUBJECTIVE AND OBJECTIVE BOX
HPI:  72 yo M w/ PMHx COPD, afib (not on AC), UC, DM, HTN admitted 12/18/21 for ear pain and dizziness. patient found to have necrotizing otitis media, bacterial conjunctivitis, seen by ENT and optho, on abx.   GI consulted for acute onset of dysphagia. Patient states for past 3 days having dysphagia to both solids and liquids. He has sensation of food being stuck in mid chest; he stands up to allow passage of food into stomach. Symptoms are present all day and night, for solids and liquids. He has been crushing medications and taking in apple sauce. He has mild cough. No vomiting. No abd pain. Patient has never had symptoms like this before. No prior EGD. Denies EtOH or smoking history, no FHx of GI malignancy. He has lost ~15 lbs over past several months.       Allergies:  penicillin (Rash)      Home Medications:    Hospital Medications:  acetaminophen     Tablet .. 650 milliGRAM(s) Oral every 6 hours PRN  ALBUTerol    90 MICROgram(s) HFA Inhaler 2 Puff(s) Inhalation every 6 hours PRN  artificial  tears Solution 1 Drop(s) Both EYES four times a day  aspirin enteric coated 81 milliGRAM(s) Oral daily  atorvastatin 40 milliGRAM(s) Oral at bedtime  budesonide 160 MICROgram(s)/formoterol 4.5 MICROgram(s) Inhaler 2 Puff(s) Inhalation two times a day  carvedilol 12.5 milliGRAM(s) Oral every 12 hours  ciprofloxacin     Tablet 500 milliGRAM(s) Oral every 12 hours  diphenoxylate/atropine 1 Tablet(s) Oral two times a day  enoxaparin Injectable 40 milliGRAM(s) SubCutaneous daily  erythromycin   Ointment 1 Application(s) Both EYES daily  latanoprost 0.005% Ophthalmic Solution 1 Drop(s) Both EYES at bedtime  losartan 100 milliGRAM(s) Oral daily  magnesium oxide 400 milliGRAM(s) Oral once  meclizine 25 milliGRAM(s) Oral three times a day  montelukast 10 milliGRAM(s) Oral at bedtime  multivitamin 1 Tablet(s) Oral daily  ofloxacin 0.3% Solution 1 Drop(s) Both EYES four times a day  timolol 0.5% Solution 1 Drop(s) Left EYE every 12 hours      PMHX/PSHX:  CHF (Congestive Heart Failure)    COPD (Chronic Obstructive Pulmonary Disease)    Diabetes    Dyslipidemia    HTN (Hypertension)    S/P Cardiac Catheterization    Asthma    Colitis    Hemorrhoid    Atrial fibrillation    Diabetes mellitus    History of Total Knee Replacement    History of Appendectomy    S/P tonsillectomy        Family history:  Family history of diabetes mellitus (Father)        Denies family history of colon cancer/polyps, stomach cancer/polyps, pancreatic cancer/masses, liver cancer/disease, ovarian cancer and endometrial cancer.    Social History:   Tob: Denies  EtOH: Denies  Illicit Drugs: Denies    ROS:     General:  No wt loss, fevers, chills, night sweats, fatigue  Eyes:  Good vision, no reported pain  ENT:  No sore throat, pain, runny nose, dysphagia  CV:  No pain, palpitations, hypo/hypertension  Pulm:  No dyspnea, cough, tachypnea, wheezing  GI:  see HPI  :  No pain, bleeding, incontinence, nocturia  Muscle:  No pain, weakness  Neuro:  No weakness, tingling, memory problems  Psych:  No fatigue, insomnia, mood problems, depression  Endocrine:  No polyuria, polydipsia, cold/heat intolerance  Heme:  No petechiae, ecchymosis, easy bruisability  Skin:  No rash, tattoos, scars, edema    PHYSICAL EXAM:     GENERAL:  No acute distress  HEENT:  NCAT, no scleral icterus, +conjunctival injections bilaterally  CHEST:  no respiratory distress  HEART:  Regular rate and rhythm  ABDOMEN:  Soft, non-tender, non-distended, normoactive bowel sounds,  no masses  EXTREMITIES: trace LE edema  SKIN:  No rash/erythema/ecchymoses/petechiae/wounds/abscess/warm/dry  NEURO:  Alert and oriented x 3, no asterixis    Vital Signs:  Vital Signs Last 24 Hrs  T(C): 36.9 (24 Dec 2021 06:55), Max: 36.9 (24 Dec 2021 06:55)  T(F): 98.4 (24 Dec 2021 06:55), Max: 98.4 (24 Dec 2021 06:55)  HR: 73 (24 Dec 2021 06:55) (73 - 88)  BP: 142/72 (24 Dec 2021 06:55) (113/72 - 148/92)  BP(mean): --  RR: 18 (24 Dec 2021 06:55) (18 - 20)  SpO2: 100% (24 Dec 2021 06:55) (99% - 100%)  Daily     Daily     LABS:                        8.6    5.83  )-----------( 445      ( 24 Dec 2021 07:42 )             30.3     Mean Cell Volume: 71.1 fL (12-24-21 @ 07:42)    12-24    137  |  103  |  14  ----------------------------<  78  4.0   |  20<L>  |  1.05    Ca    8.4      24 Dec 2021 07:42  Phos  2.8     12-24  Mg     1.70     12-24                                    8.6    5.83  )-----------( 445      ( 24 Dec 2021 07:42 )             30.3                         8.4    5.30  )-----------( 493      ( 23 Dec 2021 07:18 )             30.7                         9.6    5.81  )-----------( 543      ( 22 Dec 2021 08:00 )             34.6       Imaging:

## 2021-12-24 NOTE — PROGRESS NOTE ADULT - ATTENDING COMMENTS
Optho input appreciated. Will obtain CT scan as per Optho rec.     Spoke to pt and wife at bedside about plan of care.   D/W HS

## 2021-12-24 NOTE — CONSULT NOTE ADULT - ATTENDING COMMENTS
NEUROLOGY ATTENDING:  DOMINIC [phone]  	  CC: DIZZINESS     PATIENT SEEN & EXAMINED ON 12/18/2021  SUNRISE RECORDS REVIEWED   ALLSCRIPTS RECORDS REVIEWED  LABS REVIEWED  IMAGING REVIEWED  DISCUSSED WITH INPATIENT NEUROLOGY TEAM    Briefly, 71 yo man with DM, HTN, CHF, COPD, ASTHMA, now with infection of external ear/pinna for which he has seen ENT and been prescirbed multiple antibiotic regimens, suffered bout of true vertigo (room spinning), now resolved.    EXAM  AOx3 follows all commands, speech fluent, nonaphasic, provides excellent full history. Engaging.  No drift  No EOM deficits, but nystagmus on rightward extreme gaze    IMAGING  12/17/21 HEAD CT -- shows old lacunes, no overt mastoiditis    IMPRESSION  DIZZINESS -- doubt related to posterior circulation ischemia, but agree that MRI is prudent. Likely peripheral etiology and now resolving.
Pt. seen, examined and d/w fellow. Agree with above note. Patient is being treated with ciprofloxacin for otitis media, now developed acute esophageal dysphagia to solids and liquids, it is happening with every meal. Has remote h/o UC, unclear when he last got remicade. We will treat with empirical anti-fungal therapy along with MBS with esophagram. (with barium tablet). If he improves with antifungal therapy, he can follow as outpatient or EGD next week.      Fabian Navarro MD  [facility]

## 2021-12-24 NOTE — PROGRESS NOTE ADULT - SUBJECTIVE AND OBJECTIVE BOX
~~~~~~~~~~~~~~~~~~~~~~~~~~~~~~~~~~  Conrad Trejo, PGY2  Pager [phone] (NS) [phone] ([facility])  After 7: Night Float Pager  ~~~~~~~~~~~~~~~~~~~~~~~~~~~~~~~~~~    PROGRESS NOTE:     Patient is a 71y old  Male who presents with a chief complaint of dizziness, ear pain (23 Dec 2021 18:17)      SUBJECTIVE / OVERNIGHT EVENTS:    ADDITIONAL REVIEW OF SYSTEMS:    MEDICATIONS  (STANDING):  artificial  tears Solution 1 Drop(s) Both EYES four times a day  aspirin enteric coated 81 milliGRAM(s) Oral daily  atorvastatin 40 milliGRAM(s) Oral at bedtime  budesonide 160 MICROgram(s)/formoterol 4.5 MICROgram(s) Inhaler 2 Puff(s) Inhalation two times a day  carvedilol 12.5 milliGRAM(s) Oral every 12 hours  ciprofloxacin     Tablet 500 milliGRAM(s) Oral every 12 hours  diphenoxylate/atropine 1 Tablet(s) Oral two times a day  enoxaparin Injectable 40 milliGRAM(s) SubCutaneous daily  erythromycin   Ointment 1 Application(s) Both EYES daily  latanoprost 0.005% Ophthalmic Solution 1 Drop(s) Both EYES at bedtime  losartan 100 milliGRAM(s) Oral daily  magnesium oxide 400 milliGRAM(s) Oral once  meclizine 25 milliGRAM(s) Oral three times a day  montelukast 10 milliGRAM(s) Oral at bedtime  multivitamin 1 Tablet(s) Oral daily  ofloxacin 0.3% Solution 1 Drop(s) Both EYES four times a day  timolol 0.5% Solution 1 Drop(s) Left EYE every 12 hours    MEDICATIONS  (PRN):  acetaminophen     Tablet .. 650 milliGRAM(s) Oral every 6 hours PRN Mild Pain (1 - 3), Moderate Pain (4 - 6)  ALBUTerol    90 MICROgram(s) HFA Inhaler 2 Puff(s) Inhalation every 6 hours PRN Shortness of Breath and/or Wheezing      CAPILLARY BLOOD GLUCOSE      POCT Blood Glucose.: 134 mg/dL (23 Dec 2021 12:27)  POCT Blood Glucose.: 97 mg/dL (23 Dec 2021 08:58)    I&O's Summary      PHYSICAL EXAM:  Vital Signs Last 24 Hrs  T(C): 36.9 (24 Dec 2021 06:55), Max: 36.9 (24 Dec 2021 06:55)  T(F): 98.4 (24 Dec 2021 06:55), Max: 98.4 (24 Dec 2021 06:55)  HR: 73 (24 Dec 2021 06:55) (73 - 88)  BP: 142/72 (24 Dec 2021 06:55) (113/72 - 148/92)  BP(mean): --  RR: 18 (24 Dec 2021 06:55) (18 - 20)  SpO2: 100% (24 Dec 2021 06:55) (99% - 100%)    CONSTITUTIONAL: NAD, well-developed  RESPIRATORY: Normal respiratory effort; lungs are clear to auscultation bilaterally  CARDIOVASCULAR: Regular rate and rhythm, normal S1 and S2, no murmur/rub/gallop; No lower extremity edema; Peripheral pulses are 2+ bilaterally  ABDOMEN: Nontender to palpation, normoactive bowel sounds, no rebound/guarding; No hepatosplenomegaly  MUSCLOSKELETAL: no clubbing or cyanosis of digits; no joint swelling or tenderness to palpation  PSYCH: A+O to person, place, and time; affect appropriate    LABS:                        8.4    5.30  )-----------( 493      ( 23 Dec 2021 07:18 )             30.7     12-23    135  |  104  |  12  ----------------------------<  83  3.7   |  22  |  0.91    Ca    8.4      23 Dec 2021 07:18  Phos  2.6     12-23  Mg     1.80     12-23    TPro  7.9  /  Alb  2.6<L>  /  TBili  0.4  /  DBili  x   /  AST  26  /  ALT  18  /  AlkPhos  102  12-22                RADIOLOGY & ADDITIONAL TESTS:  Results Reviewed:   Imaging Personally Reviewed:  Electrocardiogram Personally Reviewed:    COORDINATION OF CARE:  Care Discussed with Consultants/Other Providers [Y/N]:  Prior or Outpatient Records Reviewed [Y/N]:   ~~~~~~~~~~~~~~~~~~~~~~~~~~~~~~~~~~  Conrad Trejo, PGY2  Pager [phone] (NS) [phone] ([facility])  After 7: Night Float Pager  ~~~~~~~~~~~~~~~~~~~~~~~~~~~~~~~~~~    PROGRESS NOTE:     Patient is a 71y old  Male who presents with a chief complaint of dizziness, ear pain (23 Dec 2021 18:17)      SUBJECTIVE / OVERNIGHT EVENTS:  -No acute events ovenright  -Persistent sensation of pills getting stuck in mid-chest, now being crushed  -Increased IOP in L. eye on ophtho exam yesterday, patient denies visual deficits    ADDITIONAL REVIEW OF SYSTEMS:    MEDICATIONS  (STANDING):  artificial  tears Solution 1 Drop(s) Both EYES four times a day  aspirin enteric coated 81 milliGRAM(s) Oral daily  atorvastatin 40 milliGRAM(s) Oral at bedtime  budesonide 160 MICROgram(s)/formoterol 4.5 MICROgram(s) Inhaler 2 Puff(s) Inhalation two times a day  carvedilol 12.5 milliGRAM(s) Oral every 12 hours  ciprofloxacin     Tablet 500 milliGRAM(s) Oral every 12 hours  diphenoxylate/atropine 1 Tablet(s) Oral two times a day  enoxaparin Injectable 40 milliGRAM(s) SubCutaneous daily  erythromycin   Ointment 1 Application(s) Both EYES daily  latanoprost 0.005% Ophthalmic Solution 1 Drop(s) Both EYES at bedtime  losartan 100 milliGRAM(s) Oral daily  magnesium oxide 400 milliGRAM(s) Oral once  meclizine 25 milliGRAM(s) Oral three times a day  montelukast 10 milliGRAM(s) Oral at bedtime  multivitamin 1 Tablet(s) Oral daily  ofloxacin 0.3% Solution 1 Drop(s) Both EYES four times a day  timolol 0.5% Solution 1 Drop(s) Left EYE every 12 hours    MEDICATIONS  (PRN):  acetaminophen     Tablet .. 650 milliGRAM(s) Oral every 6 hours PRN Mild Pain (1 - 3), Moderate Pain (4 - 6)  ALBUTerol    90 MICROgram(s) HFA Inhaler 2 Puff(s) Inhalation every 6 hours PRN Shortness of Breath and/or Wheezing      CAPILLARY BLOOD GLUCOSE      POCT Blood Glucose.: 134 mg/dL (23 Dec 2021 12:27)  POCT Blood Glucose.: 97 mg/dL (23 Dec 2021 08:58)    I&O's Summary      PHYSICAL EXAM:  Vital Signs Last 24 Hrs  T(C): 36.9 (24 Dec 2021 06:55), Max: 36.9 (24 Dec 2021 06:55)  T(F): 98.4 (24 Dec 2021 06:55), Max: 98.4 (24 Dec 2021 06:55)  HR: 73 (24 Dec 2021 06:55) (73 - 88)  BP: 142/72 (24 Dec 2021 06:55) (113/72 - 148/92)  BP(mean): --  RR: 18 (24 Dec 2021 06:55) (18 - 20)  SpO2: 100% (24 Dec 2021 06:55) (99% - 100%)    CONSTITUTIONAL: NAD, well-developed  RESPIRATORY: Normal respiratory effort; lungs are clear to auscultation bilaterally  HEENT: Bilateral conjuctival injection, L. pinna with improved TTP  CARDIOVASCULAR: Regular rate and rhythm, normal S1 and S2, no murmur/rub/gallop; No lower extremity edema; Peripheral pulses are 2+ bilaterally  ABDOMEN: Nontender to palpation, normoactive bowel sounds, no rebound/guarding; No hepatosplenomegaly  MUSCLOSKELETAL: no clubbing or cyanosis of digits; no joint swelling or tenderness to palpation  PSYCH: A+O to person, place, and time; affect appropriate    LABS:                        8.4    5.30  )-----------( 493      ( 23 Dec 2021 07:18 )             30.7     12-23    135  |  104  |  12  ----------------------------<  83  3.7   |  22  |  0.91    Ca    8.4      23 Dec 2021 07:18  Phos  2.6     12-23  Mg     1.80     12-23    TPro  7.9  /  Alb  2.6<L>  /  TBili  0.4  /  DBili  x   /  AST  26  /  ALT  18  /  AlkPhos  102  12-22                RADIOLOGY & ADDITIONAL TESTS:  Results Reviewed:   Imaging Personally Reviewed:  Electrocardiogram Personally Reviewed:    COORDINATION OF CARE:  Care Discussed with Consultants/Other Providers [Y/N]:  Prior or Outpatient Records Reviewed [Y/N]:

## 2021-12-24 NOTE — PROGRESS NOTE ADULT - ASSESSMENT
72 yo M PMHx of COPD, Asthma, ?atrial fibrillation per chart (not on AC), Ulcerative Colitis, DM (no longer on insulin) and HTN presenting with complaint of left ear pain x 1 month presenting with inability to ambulate 2/2 dizziness described as room spinning sensation, 9/10 frontal headache, and bilateral eye erythema. CT IAC concerning for necrotizing otitis externa. MR negative for acute stroke and internal auditory canal involvement.

## 2021-12-25 DIAGNOSIS — R13.10 DYSPHAGIA, UNSPECIFIED: ICD-10-CM

## 2021-12-25 PROCEDURE — 99232 SBSQ HOSP IP/OBS MODERATE 35: CPT | Mod: GC

## 2021-12-25 PROCEDURE — 99222 1ST HOSP IP/OBS MODERATE 55: CPT | Mod: GC

## 2021-12-25 RX ORDER — ACETAMINOPHEN 500 MG
1000 TABLET ORAL ONCE
Refills: 0 | Status: COMPLETED | OUTPATIENT
Start: 2021-12-25 | End: 2021-12-25

## 2021-12-25 RX ORDER — IBUPROFEN 200 MG
400 TABLET ORAL ONCE
Refills: 0 | Status: DISCONTINUED | OUTPATIENT
Start: 2021-12-25 | End: 2021-12-25

## 2021-12-25 RX ORDER — FLUCONAZOLE 150 MG/1
200 TABLET ORAL EVERY 24 HOURS
Refills: 0 | Status: DISCONTINUED | OUTPATIENT
Start: 2021-12-25 | End: 2021-12-31

## 2021-12-25 RX ADMIN — Medication 400 MILLIGRAM(S): at 22:04

## 2021-12-25 RX ADMIN — LATANOPROST 1 DROP(S): 0.05 SOLUTION/ DROPS OPHTHALMIC; TOPICAL at 23:37

## 2021-12-25 RX ADMIN — Medication 500 MILLIGRAM(S): at 05:26

## 2021-12-25 RX ADMIN — Medication 1 DROP(S): at 11:59

## 2021-12-25 RX ADMIN — Medication 500 MILLIGRAM(S): at 18:00

## 2021-12-25 RX ADMIN — Medication 1 TABLET(S): at 12:00

## 2021-12-25 RX ADMIN — Medication 1 DROP(S): at 05:27

## 2021-12-25 RX ADMIN — Medication 400 MILLIGRAM(S): at 15:01

## 2021-12-25 RX ADMIN — Medication 1 DROP(S): at 23:37

## 2021-12-25 RX ADMIN — FLUCONAZOLE 100 MILLIGRAM(S): 150 TABLET ORAL at 21:42

## 2021-12-25 RX ADMIN — ATORVASTATIN CALCIUM 40 MILLIGRAM(S): 80 TABLET, FILM COATED ORAL at 21:43

## 2021-12-25 RX ADMIN — Medication 1 TABLET(S): at 18:07

## 2021-12-25 RX ADMIN — LOSARTAN POTASSIUM 100 MILLIGRAM(S): 100 TABLET, FILM COATED ORAL at 05:27

## 2021-12-25 RX ADMIN — BUDESONIDE AND FORMOTEROL FUMARATE DIHYDRATE 2 PUFF(S): 160; 4.5 AEROSOL RESPIRATORY (INHALATION) at 21:42

## 2021-12-25 RX ADMIN — MONTELUKAST 10 MILLIGRAM(S): 4 TABLET, CHEWABLE ORAL at 21:42

## 2021-12-25 RX ADMIN — Medication 1 DROP(S): at 07:15

## 2021-12-25 RX ADMIN — Medication 1 TABLET(S): at 05:26

## 2021-12-25 RX ADMIN — Medication 1000 MILLIGRAM(S): at 15:20

## 2021-12-25 RX ADMIN — Medication 25 MILLIGRAM(S): at 15:01

## 2021-12-25 RX ADMIN — Medication 25 MILLIGRAM(S): at 05:26

## 2021-12-25 RX ADMIN — BUDESONIDE AND FORMOTEROL FUMARATE DIHYDRATE 2 PUFF(S): 160; 4.5 AEROSOL RESPIRATORY (INHALATION) at 11:59

## 2021-12-25 RX ADMIN — Medication 1 DROP(S): at 17:59

## 2021-12-25 RX ADMIN — CARVEDILOL PHOSPHATE 12.5 MILLIGRAM(S): 80 CAPSULE, EXTENDED RELEASE ORAL at 05:26

## 2021-12-25 RX ADMIN — Medication 1 APPLICATION(S): at 12:00

## 2021-12-25 RX ADMIN — CARVEDILOL PHOSPHATE 12.5 MILLIGRAM(S): 80 CAPSULE, EXTENDED RELEASE ORAL at 18:00

## 2021-12-25 RX ADMIN — Medication 81 MILLIGRAM(S): at 12:00

## 2021-12-25 RX ADMIN — Medication 400 MILLIGRAM(S): at 07:29

## 2021-12-25 RX ADMIN — Medication 1 DROP(S): at 18:01

## 2021-12-25 RX ADMIN — ENOXAPARIN SODIUM 40 MILLIGRAM(S): 100 INJECTION SUBCUTANEOUS at 12:00

## 2021-12-25 RX ADMIN — Medication 1000 MILLIGRAM(S): at 22:20

## 2021-12-25 RX ADMIN — Medication 25 MILLIGRAM(S): at 21:43

## 2021-12-25 NOTE — PROGRESS NOTE ADULT - PROBLEM SELECTOR PLAN 1
- pt reports left ear pain and swelling x 1 month  - pt is s/p PO bactrim outpatient   - CT concerning for necrotizing otitis externa, MR negative for IAC involvement  - pt is s/p IV Cipro and IV clinda in ER  - ENT following, rec Ciprofloxacin for chondritis to cover for pseudomonas  - Will continue cipro, changed to PO, continue for 10d course through 12/27 - pt with bilateral conjunctivitis   - no purulent eye discharge   - CT showing orbital proptosis - TSH wnl  - will continue latanoprost eye drops  - erythromycin + Ofloxacin per Optho  - R/o [facility] with CT orbit  - timolol added for increase IOP of left eye

## 2021-12-25 NOTE — PROGRESS NOTE ADULT - PROBLEM SELECTOR PLAN 4
- pt with bilateral conjunctivitis   - no purulent eye discharge   - CT showing orbital proptosis - TSH wnl  - will continue latanoprost eye drops  - erythromycin + Ofloxacin per Optho  - R/o [facility] with CT orbit  - tim[facility]ol added for increase IOP of left [facility]

## 2021-12-25 NOTE — PROGRESS NOTE ADULT - PROBLEM SELECTOR PLAN 8
C/w home carvedilol, losartan DVT ppx: Lovenox SQ  Diet: Carb consistent  Dispo: Pending clinical improvement  Dispo: PT rec rehab

## 2021-12-25 NOTE — PROGRESS NOTE ADULT - PROBLEM SELECTOR PLAN 2
- pt reports dizziness with rest and worse with ambulating  - dizziness possibly 2/2 inner ear etiology   - Neuro following  - continue asa/statin   - aspiration/fall/safety precautions  - c/w meclizine 25mg TID as patient reports improvement with this  - TTE, and MR negative for cardioembolic etiology  - unclear history of afib. Patient denies. Will f/u with outpatient PCP. No events on tele  - + orthostatics, s/p IV hydration, repeat wnl  - Outpatient vestibular rehab Less likely due to candidiasis but will treat empirically pending barium swallow. If negative will pursue EGD  -appreciate GI recs  -f/u barium swallow   -full liquid diet

## 2021-12-25 NOTE — PROGRESS NOTE ADULT - SUBJECTIVE AND OBJECTIVE BOX
PROGRESS NOTE:     Patient is a 71y old  Male who presents with a chief complaint of dizziness, ear pain (24 Dec 2021 13:54)      SUBJECTIVE / OVERNIGHT EVENTS:        MEDICATIONS  (STANDING):  artificial  tears Solution 1 Drop(s) Both EYES four times a day  aspirin enteric coated 81 milliGRAM(s) Oral daily  atorvastatin 40 milliGRAM(s) Oral at bedtime  budesonide 160 MICROgram(s)/formoterol 4.5 MICROgram(s) Inhaler 2 Puff(s) Inhalation two times a day  carvedilol 12.5 milliGRAM(s) Oral every 12 hours  ciprofloxacin     Tablet 500 milliGRAM(s) Oral every 12 hours  diphenoxylate/atropine 1 Tablet(s) Oral two times a day  enoxaparin Injectable 40 milliGRAM(s) SubCutaneous daily  erythromycin   Ointment 1 Application(s) Both EYES daily  latanoprost 0.005% Ophthalmic Solution 1 Drop(s) Both EYES at bedtime  losartan 100 milliGRAM(s) Oral daily  magnesium oxide 400 milliGRAM(s) Oral once  meclizine 25 milliGRAM(s) Oral three times a day  montelukast 10 milliGRAM(s) Oral at bedtime  multivitamin 1 Tablet(s) Oral daily  ofloxacin 0.3% Solution 1 Drop(s) Both EYES four times a day  timolol 0.5% Solution 1 Drop(s) Left EYE every 12 hours    MEDICATIONS  (PRN):  acetaminophen     Tablet .. 650 milliGRAM(s) Oral every 6 hours PRN Mild Pain (1 - 3), Moderate Pain (4 - 6)  ALBUTerol    90 MICROgram(s) HFA Inhaler 2 Puff(s) Inhalation every 6 hours PRN Shortness of Breath and/or Wheezing      CAPILLARY BLOOD GLUCOSE        I&O's Summary    24 Dec 2021 07:01  -  25 Dec 2021 07:00  --------------------------------------------------------  IN: 480 mL / OUT: 0 mL / NET: 480 mL        PHYSICAL EXAM:  Vital Signs Last 24 Hrs  T(C): 36.9 (25 Dec 2021 04:42), Max: 37.2 (24 Dec 2021 20:59)  T(F): 98.4 (25 Dec 2021 04:42), Max: 99 (24 Dec 2021 20:59)  HR: 98 (25 Dec 2021 04:42) (90 - 98)  BP: 159/94 (25 Dec 2021 04:42) (122/65 - 159/94)  BP(mean): --  RR: 18 (25 Dec 2021 04:42) (17 - 18)  SpO2: 97% (25 Dec 2021 04:42) (97% - 100%)    CONSTITUTIONAL: NAD, well-developed  RESPIRATORY: Normal respiratory effort; lungs are clear to auscultation bilaterally  CARDIOVASCULAR: Regular rate and rhythm, normal S1 and S2, no murmur/rub/gallop; No lower extremity edema; Peripheral pulses are 2+ bilaterally  ABDOMEN: Nontender to palpation, normoactive bowel sounds, no rebound/guarding; No hepatosplenomegaly  MUSCLOSKELETAL: no clubbing or cyanosis of digits; no joint swelling or tenderness to palpation  NEURO: CN 2-12 grossly intact, moves all limbs spontaneously  PSYCH: A+O to person, place, and time; affect appropriate    LABS:                        8.6    5.83  )-----------( 445      ( 24 Dec 2021 07:42 )             30.3     12-24    137  |  103  |  14  ----------------------------<  78  4.0   |  20<L>  |  1.05    Ca    8.4      24 Dec 2021 07:42  Phos  2.8     12-24  Mg     1.70     12-24                  RADIOLOGY & ADDITIONAL TESTS:  Results Reviewed:   Imaging Personally Reviewed:  Electrocardiogram Personally Reviewed:    COORDINATION OF CARE:  Care Discussed with Consultants/Other Providers [Y/N]:  Prior or Outpatient Records Reviewed [Y/N]:   PROGRESS NOTE:     Patient is a 71y old  Male who presents with a chief complaint of dizziness, ear pain (24 Dec 2021 13:54)      SUBJECTIVE / OVERNIGHT EVENTS:  -NAEO  -Patient states L eye blurry vision but same as yesterday  -States dizziness has improved and able to ambulate   -pend CT scan      MEDICATIONS  (STANDING):  artificial  tears Solution 1 Drop(s) Both EYES four times a day  aspirin enteric coated 81 milliGRAM(s) Oral daily  atorvastatin 40 milliGRAM(s) Oral at bedtime  budesonide 160 MICROgram(s)/formoterol 4.5 MICROgram(s) Inhaler 2 Puff(s) Inhalation two times a day  carvedilol 12.5 milliGRAM(s) Oral every 12 hours  ciprofloxacin     Tablet 500 milliGRAM(s) Oral every 12 hours  diphenoxylate/atropine 1 Tablet(s) Oral two times a day  enoxaparin Injectable 40 milliGRAM(s) SubCutaneous daily  erythromycin   Ointment 1 Application(s) Both EYES daily  latanoprost 0.005% Ophthalmic Solution 1 Drop(s) Both EYES at bedtime  losartan 100 milliGRAM(s) Oral daily  magnesium oxide 400 milliGRAM(s) Oral once  meclizine 25 milliGRAM(s) Oral three times a day  montelukast 10 milliGRAM(s) Oral at bedtime  multivitamin 1 Tablet(s) Oral daily  ofloxacin 0.3% Solution 1 Drop(s) Both EYES four times a day  timolol 0.5% Solution 1 Drop(s) Left EYE every 12 hours    MEDICATIONS  (PRN):  acetaminophen     Tablet .. 650 milliGRAM(s) Oral every 6 hours PRN Mild Pain (1 - 3), Moderate Pain (4 - 6)  ALBUTerol    90 MICROgram(s) HFA Inhaler 2 Puff(s) Inhalation every 6 hours PRN Shortness of Breath and/or Wheezing      CAPILLARY BLOOD GLUCOSE        I&O's Summary    24 Dec 2021 07:01  -  25 Dec 2021 07:00  --------------------------------------------------------  IN: 480 mL / OUT: 0 mL / NET: 480 mL        PHYSICAL EXAM:  Vital Signs Last 24 Hrs  T(C): 36.9 (25 Dec 2021 04:42), Max: 37.2 (24 Dec 2021 20:59)  T(F): 98.4 (25 Dec 2021 04:42), Max: 99 (24 Dec 2021 20:59)  HR: 98 (25 Dec 2021 04:42) (90 - 98)  BP: 159/94 (25 Dec 2021 04:42) (122/65 - 159/94)  BP(mean): --  RR: 18 (25 Dec 2021 04:42) (17 - 18)  SpO2: 97% (25 Dec 2021 04:42) (97% - 100%)    General: well appearing   HEENT: neck supple, scleral injection  Cardiovascular: Normal s1, s2, RRR  Respiratory: CTA b/l   Abdominal: Soft, ntnd  Extremities: No swelling in LEs  Neurologic: Non focal  Psych: Awake, alert answering questions appropriately      LABS:                        8.6    5.83  )-----------( 445      ( 24 Dec 2021 07:42 )             30.3     12-24    137  |  103  |  14  ----------------------------<  78  4.0   |  20<L>  |  1.05    Ca    8.4      24 Dec 2021 07:42  Phos  2.8     12-24  Mg     1.70     12-24                  RADIOLOGY & ADDITIONAL TESTS:  Results Reviewed:   Imaging Personally Reviewed:  Electrocardiogram Personally Reviewed:    COORDINATION OF CARE:  Care Discussed with Consultants/Other Providers [Y/N]:  Prior or Outpatient Records Reviewed [Y/N]:

## 2021-12-26 LAB
ANION GAP SERPL CALC-SCNC: 9 MMOL/L — SIGNIFICANT CHANGE UP (ref 7–14)
BUN SERPL-MCNC: 9 MG/DL — SIGNIFICANT CHANGE UP (ref 7–23)
CALCIUM SERPL-MCNC: 8.5 MG/DL — SIGNIFICANT CHANGE UP (ref 8.4–10.5)
CHLORIDE SERPL-SCNC: 103 MMOL/L — SIGNIFICANT CHANGE UP (ref 98–107)
CO2 SERPL-SCNC: 18 MMOL/L — LOW (ref 22–31)
CREAT SERPL-MCNC: 0.91 MG/DL — SIGNIFICANT CHANGE UP (ref 0.5–1.3)
GLUCOSE SERPL-MCNC: 87 MG/DL — SIGNIFICANT CHANGE UP (ref 70–99)
HCT VFR BLD CALC: 35.5 % — LOW (ref 39–50)
HGB BLD-MCNC: 9.5 G/DL — LOW (ref 13–17)
MAGNESIUM SERPL-MCNC: 1.8 MG/DL — SIGNIFICANT CHANGE UP (ref 1.6–2.6)
MCHC RBC-ENTMCNC: 20.2 PG — LOW (ref 27–34)
MCHC RBC-ENTMCNC: 26.8 GM/DL — LOW (ref 32–36)
MCV RBC AUTO: 75.5 FL — LOW (ref 80–100)
NRBC # BLD: 0 /100 WBCS — SIGNIFICANT CHANGE UP
NRBC # FLD: 0 K/UL — SIGNIFICANT CHANGE UP
PHOSPHATE SERPL-MCNC: 2.7 MG/DL — SIGNIFICANT CHANGE UP (ref 2.5–4.5)
PLATELET # BLD AUTO: 472 K/UL — HIGH (ref 150–400)
POTASSIUM SERPL-MCNC: 4.2 MMOL/L — SIGNIFICANT CHANGE UP (ref 3.5–5.3)
POTASSIUM SERPL-SCNC: 4.2 MMOL/L — SIGNIFICANT CHANGE UP (ref 3.5–5.3)
RBC # BLD: 4.7 M/UL — SIGNIFICANT CHANGE UP (ref 4.2–5.8)
RBC # FLD: 21.4 % — HIGH (ref 10.3–14.5)
SARS-COV-2 RNA SPEC QL NAA+PROBE: SIGNIFICANT CHANGE UP
SODIUM SERPL-SCNC: 130 MMOL/L — LOW (ref 135–145)
WBC # BLD: 6.37 K/UL — SIGNIFICANT CHANGE UP (ref 3.8–10.5)
WBC # FLD AUTO: 6.37 K/UL — SIGNIFICANT CHANGE UP (ref 3.8–10.5)

## 2021-12-26 PROCEDURE — 99232 SBSQ HOSP IP/OBS MODERATE 35: CPT | Mod: GC

## 2021-12-26 PROCEDURE — 70496 CT ANGIOGRAPHY HEAD: CPT | Mod: 26

## 2021-12-26 RX ORDER — ACETAMINOPHEN 500 MG
1000 TABLET ORAL ONCE
Refills: 0 | Status: COMPLETED | OUTPATIENT
Start: 2021-12-26 | End: 2021-12-26

## 2021-12-26 RX ORDER — MECLIZINE HCL 12.5 MG
12.5 TABLET ORAL THREE TIMES A DAY
Refills: 0 | Status: DISCONTINUED | OUTPATIENT
Start: 2021-12-26 | End: 2021-12-28

## 2021-12-26 RX ADMIN — Medication 1000 MILLIGRAM(S): at 22:50

## 2021-12-26 RX ADMIN — Medication 1 TABLET(S): at 18:27

## 2021-12-26 RX ADMIN — Medication 1 TABLET(S): at 07:01

## 2021-12-26 RX ADMIN — Medication 1 DROP(S): at 18:20

## 2021-12-26 RX ADMIN — Medication 500 MILLIGRAM(S): at 06:50

## 2021-12-26 RX ADMIN — Medication 1000 MILLIGRAM(S): at 12:02

## 2021-12-26 RX ADMIN — Medication 12.5 MILLIGRAM(S): at 13:37

## 2021-12-26 RX ADMIN — MONTELUKAST 10 MILLIGRAM(S): 4 TABLET, CHEWABLE ORAL at 22:28

## 2021-12-26 RX ADMIN — Medication 400 MILLIGRAM(S): at 11:32

## 2021-12-26 RX ADMIN — ENOXAPARIN SODIUM 40 MILLIGRAM(S): 100 INJECTION SUBCUTANEOUS at 11:32

## 2021-12-26 RX ADMIN — Medication 1 TABLET(S): at 11:33

## 2021-12-26 RX ADMIN — LATANOPROST 1 DROP(S): 0.05 SOLUTION/ DROPS OPHTHALMIC; TOPICAL at 22:29

## 2021-12-26 RX ADMIN — ATORVASTATIN CALCIUM 40 MILLIGRAM(S): 80 TABLET, FILM COATED ORAL at 22:28

## 2021-12-26 RX ADMIN — LOSARTAN POTASSIUM 100 MILLIGRAM(S): 100 TABLET, FILM COATED ORAL at 06:47

## 2021-12-26 RX ADMIN — Medication 1 DROP(S): at 11:33

## 2021-12-26 RX ADMIN — Medication 1 DROP(S): at 06:47

## 2021-12-26 RX ADMIN — Medication 12.5 MILLIGRAM(S): at 22:28

## 2021-12-26 RX ADMIN — Medication 81 MILLIGRAM(S): at 11:34

## 2021-12-26 RX ADMIN — Medication 400 MILLIGRAM(S): at 22:22

## 2021-12-26 RX ADMIN — FLUCONAZOLE 100 MILLIGRAM(S): 150 TABLET ORAL at 22:28

## 2021-12-26 RX ADMIN — CARVEDILOL PHOSPHATE 12.5 MILLIGRAM(S): 80 CAPSULE, EXTENDED RELEASE ORAL at 18:21

## 2021-12-26 RX ADMIN — Medication 500 MILLIGRAM(S): at 18:27

## 2021-12-26 RX ADMIN — Medication 1 DROP(S): at 06:48

## 2021-12-26 RX ADMIN — Medication 25 MILLIGRAM(S): at 06:47

## 2021-12-26 RX ADMIN — BUDESONIDE AND FORMOTEROL FUMARATE DIHYDRATE 2 PUFF(S): 160; 4.5 AEROSOL RESPIRATORY (INHALATION) at 09:41

## 2021-12-26 RX ADMIN — CARVEDILOL PHOSPHATE 12.5 MILLIGRAM(S): 80 CAPSULE, EXTENDED RELEASE ORAL at 06:49

## 2021-12-26 NOTE — SWALLOW BEDSIDE ASSESSMENT ADULT - SWALLOW EVAL: DIAGNOSIS
1. Functional oral stage given thin liquids, puree and regular solid marked by adequate bolus containment, timely mastication with ability to break down solid textures and adequate anterior-posterior transport. Adequate oral clearance noted. 2. Pharygneal stage marked by observed initiation of the pharyngaeal swallow trigger and hyolaryngeal excursion judged via laryngeal palpation. No clinically overt s/sx of aspiration observed for all trials.
1. Functional oral stage given thin liquids, puree and regular solid marked by adequate bolus containment, bolus manipulation, timely mastication with ability to break down solid textures and adequate anterior-posterior transport. Adequate oral clearance noted. 2. Functional pharyngeal stage given above textures marked by observed initiation of the pharyngeal swallow trigger and hyolaryngeal excursion judged via laryngeal palpation. No overt s/sx of aspiration observed for all trials.

## 2021-12-26 NOTE — PROGRESS NOTE ADULT - SUBJECTIVE AND OBJECTIVE BOX
~~~~~~~~~~~~~~~~~~~~~~~~~~~~~~~~~~  Conrad Trejo, PGY2  Pager [phone] (NS) [phone] ([facility])  After 7: Night Float Pager  ~~~~~~~~~~~~~~~~~~~~~~~~~~~~~~~~~~    PROGRESS NOTE:     Patient is a 71y old  Male who presents with a chief complaint of dizziness, ear pain (25 Dec 2021 08:50)      SUBJECTIVE / OVERNIGHT EVENTS:  -C/o HA overnight, somewhat relieved with ibuprofen  -Otherwise no acute complaints    ADDITIONAL REVIEW OF SYSTEMS:    MEDICATIONS  (STANDING):  artificial  tears Solution 1 Drop(s) Both EYES four times a day  aspirin enteric coated 81 milliGRAM(s) Oral daily  atorvastatin 40 milliGRAM(s) Oral at bedtime  budesonide 160 MICROgram(s)/formoterol 4.5 MICROgram(s) Inhaler 2 Puff(s) Inhalation two times a day  carvedilol 12.5 milliGRAM(s) Oral every 12 hours  ciprofloxacin     Tablet 500 milliGRAM(s) Oral every 12 hours  diphenoxylate/atropine 1 Tablet(s) Oral two times a day  enoxaparin Injectable 40 milliGRAM(s) SubCutaneous daily  fluconAZOLE IVPB 200 milliGRAM(s) IV Intermittent every 24 hours  latanoprost 0.005% Ophthalmic Solution 1 Drop(s) Both EYES at bedtime  losartan 100 milliGRAM(s) Oral daily  magnesium oxide 400 milliGRAM(s) Oral once  meclizine 25 milliGRAM(s) Oral three times a day  montelukast 10 milliGRAM(s) Oral at bedtime  multivitamin 1 Tablet(s) Oral daily  timolol 0.5% Solution 1 Drop(s) Left EYE every 12 hours    MEDICATIONS  (PRN):  acetaminophen     Tablet .. 650 milliGRAM(s) Oral every 6 hours PRN Mild Pain (1 - 3), Moderate Pain (4 - 6)  ALBUTerol    90 MICROgram(s) HFA Inhaler 2 Puff(s) Inhalation every 6 hours PRN Shortness of Breath and/or Wheezing      CAPILLARY BLOOD GLUCOSE        I&O's Summary    25 Dec 2021 07:01  -  26 Dec 2021 07:00  --------------------------------------------------------  IN: 480 mL / OUT: 800 mL / NET: -320 mL        PHYSICAL EXAM:  Vital Signs Last 24 Hrs  T(C): 36.8 (26 Dec 2021 06:54), Max: 37.1 (25 Dec 2021 14:00)  T(F): 98.2 (26 Dec 2021 06:54), Max: 98.8 (25 Dec 2021 14:00)  HR: 67 (26 Dec 2021 06:54) (62 - 88)  BP: 173/84 (26 Dec 2021 06:54) (138/70 - 173/84)  BP(mean): --  RR: 18 (26 Dec 2021 06:54) (16 - 18)  SpO2: 98% (26 Dec 2021 06:54) (98% - 98%)    CONSTITUTIONAL: NAD, well-developed  HEENT" Bilateral conjuctival injection  RESPIRATORY: Normal respiratory effort; lungs are clear to auscultation bilaterally  CARDIOVASCULAR: Regular rate and rhythm, normal S1 and S2, no murmur/rub/gallop; No lower extremity edema; Peripheral pulses are 2+ bilaterally  ABDOMEN: Nontender to palpation, normoactive bowel sounds, no rebound/guarding; No hepatosplenomegaly  MUSCLOSKELETAL: no clubbing or cyanosis of digits; no joint swelling or tenderness to palpation  PSYCH: A+O to person, place, and time; affect appropriate    LABS:                        8.6    5.83  )-----------( 445      ( 24 Dec 2021 07:42 )             30.3     12-24    137  |  103  |  14  ----------------------------<  78  4.0   |  20<L>  |  1.05    Ca    8.4      24 Dec 2021 07:42  Phos  2.8     12-24  Mg     1.70     12-24                  RADIOLOGY & ADDITIONAL TESTS:  Results Reviewed:   Imaging Personally Reviewed:  Electrocardiogram Personally Reviewed:    COORDINATION OF CARE:  Care Discussed with Consultants/Other Providers [Y/N]:  Prior or Outpatient Records Reviewed [Y/N]:

## 2021-12-26 NOTE — SWALLOW BEDSIDE ASSESSMENT ADULT - COMMENTS
As per Internal Medicine note 12/20/21: "70 yo M PMHx of COPD, Asthma, ?atrial fibrillation per chart (not on AC), Ulcerative Colitis, DM (no longer on insulin) and HTN presenting with complaint of left ear pain x 1 month presenting with inability to ambulate 2/2 dizziness described as room spinning sensation, 9/10 frontal headache, and bilateral eye erythema. CT IAC concerning for necrotizing otitis externa. Started on cipro. Pending MRI to r/o stroke, though dizziness is likely peripheral in etiology."     Patient received upright in bedside chair, awake, alert and communicative. Patient denies symptoms of oropharyngeal dysphagia, though reports  stuck sensation/food occasionally 'moves slowly', pointing to distal esophageal area, with eventual clearance.
As per Internal Medicine Note on 12/26/21: "70 yo M PMHx of COPD, Asthma, ?atrial fibrillation per chart (not on AC), Ulcerative Colitis, DM (no longer on insulin) and HTN presenting with complaint of left ear pain x 1 month presenting with inability to ambulate 2/2 dizziness described as room spinning sensation, 9/10 frontal headache, and bilateral eye erythema. CT IAC concerning for necrotizing otitis externa. MR negative for acute stroke and internal auditory canal involvement."    Patient is familiar to this department as the patient was seen on 12/20/21 for an initial swallow evaluation with recommendation of regular diet with thin liquids and a GI consult at MD's discretion. Please refer to full report for further details.     WBC WFL.    Of note, SLP received clearance from T8 to advance patient's diet (i.e. trial purees/solids in swallow evaluation) prior to provision of PO trials as patient is currently on full liquid diet.     Patient received upright in bed, asleep, though easily arousable via verbal cues to awake and alert state. Patient participated in conversational speech with SLP and denies symptoms of oropharyngeal dysphagia and continues to endorse stuck sensation in chest area/distal esophagus when consuming regular solids. However, patient expressed sensation has subsided since consumption of soft textures (i.e. applesauce, pudding).

## 2021-12-26 NOTE — PROGRESS NOTE ADULT - ASSESSMENT
Impression:  72 yo M w/ PMHx COPD, afib, UC, DM, HTN admitted 12/18/21 w/ necrotizing otitis media, found to also have bacterial conjunctivitis; GI now consulted for dysphagia    # dysphagia - unclear etiology. Potential sources for dysphagia include obstructive etiologies and propulsion etiologies. Among obstructive etiologies, such as mass, stricture, ring - would expect more prolonged course, as well as progression of solids -> liquids. Zenkers possible as well. Dysmotility is the other class of disorders that may explain his dysphagia, such as achalasia vs other disorder of peristalsis. His recent MRI brain/head from 12/21/21 notable for nonspecific abnormal white matter foci likely from microvascular disease, and unlikely to explain current symptoms.     # otitis media  # conjunctivitis  # UC - previously on remicade, follows w/ Dr. Purvis, last colonoscopy 11/2019 w/ severe inflammation and stricture at 30cm    Recommendations:  - liquid diet  - SLP evaluation  - barium esophagram  - plan for EGD tomorrow; please repeat COVID-19 test tonight, make NPO at [facility]  - pending above workup, may benefit from manometry  - rest of care per primary team    GI will continue to follow.     Billy Arboleda, PGY5  Gastroenterology/Hepatology Fellow  Available on [employer] Teams  [phone] ([employer] Short Range Pager)  [phone] (Long Range Pager)    After 5pm, please contact the on-call GI fellow. [phone]   Impression:  72 yo M w/ PMHx COPD, afib, UC, DM, HTN admitted 12/18/21 w/ necrotizing otitis media, found to also have bacterial conjunctivitis; GI now consulted for dysphagia    # dysphagia - unclear etiology. Potential sources for dysphagia include obstructive etiologies and propulsion etiologies. Among obstructive etiologies, such as mass, stricture, ring - would expect more prolonged course, as well as progression of solids -> liquids. Zenkers possible as well. Dysmotility is the other class of disorders that may explain his dysphagia, such as achalasia vs other disorder of peristalsis. His recent MRI brain/head from 12/21/21 notable for nonspecific abnormal white matter foci likely from microvascular disease, and unlikely to explain current symptoms.     # otitis media  # conjunctivitis  # UC - previously on remicade, follows w/ Dr. Purvis, last colonoscopy 11/2019 w/ severe inflammation and stricture at 30cm    Recommendations:  - liquid diet  - SLP evaluation appreciated, nl  - barium esophagram  - plan for EGD tomorrow; please repeat COVID-19 test tonight, make NPO at [facility]  - pending above workup, may benefit from manometry  - c/w fluconazole x 14d  - rest of care per primary team    GI will continue to follow.     Billy Arboleda, PGY5  Gastroenterology/Hepatology Fellow  Available on Microsoft Teams  [phone] ([employer] Short Range Pager)  [phone] (Long Range Pager)    After 5pm, please contact the on-call GI fellow. [phone]

## 2021-12-26 NOTE — SWALLOW BEDSIDE ASSESSMENT ADULT - ADDITIONAL RECOMMENDATIONS
1. Monitor for diet tolerance and re-consult this department as indicated. 1. Monitor for diet tolerance and re-consult this department as indicated. 2. Continue to GI follow up at MD's discretion as patient continues to endorse stuck sensation with solids at distal esophagus. 1. Monitor for diet tolerance and re-consult this department as indicated. 2. Continue with GI follow up at MD's discretion as patient continues to endorse stuck sensation with solids at distal esophagus.

## 2021-12-26 NOTE — PROGRESS NOTE ADULT - ATTENDING COMMENTS
Pt. seen, examined and d.w fellow. Agree with above note. Pt. is a Impression:  70 yo M w/ PMHx COPD, afib, UC, DM, HTN admitted 12/18/21 w/ necrotizing otitis media, found to also have bacterial conjunctivitis; GI now consulted for dysphagia. Being empirically treated with fluconazole and will plan for EGD tomorrow.      Fabian Navarro MD  [facility]

## 2021-12-26 NOTE — PROGRESS NOTE ADULT - PROBLEM SELECTOR PLAN 2
Less likely due to candidiasis but will treat empirically pending barium swallow. If negative will pursue EGD  -appreciate GI recs  -f/u barium swallow   -full liquid diet

## 2021-12-26 NOTE — PROGRESS NOTE ADULT - SUBJECTIVE AND OBJECTIVE BOX
Gastroenterology/Hepatology Progress Note      Interval Events:   - no new complaints  - states dysphagia slightly improved w/ dysphagia diet    Allergies:  penicillin (Rash)      Hospital Medications:  acetaminophen     Tablet .. 650 milliGRAM(s) Oral every 6 hours PRN  acetaminophen   IVPB .. 1000 milliGRAM(s) IV Intermittent once  ALBUTerol    90 MICROgram(s) HFA Inhaler 2 Puff(s) Inhalation every 6 hours PRN  artificial  tears Solution 1 Drop(s) Both EYES four times a day  aspirin enteric coated 81 milliGRAM(s) Oral daily  atorvastatin 40 milliGRAM(s) Oral at bedtime  budesonide 160 MICROgram(s)/formoterol 4.5 MICROgram(s) Inhaler 2 Puff(s) Inhalation two times a day  carvedilol 12.5 milliGRAM(s) Oral every 12 hours  ciprofloxacin     Tablet 500 milliGRAM(s) Oral every 12 hours  diphenoxylate/atropine 1 Tablet(s) Oral two times a day  enoxaparin Injectable 40 milliGRAM(s) SubCutaneous daily  fluconAZOLE IVPB 200 milliGRAM(s) IV Intermittent every 24 hours  latanoprost 0.005% Ophthalmic Solution 1 Drop(s) Both EYES at bedtime  losartan 100 milliGRAM(s) Oral daily  magnesium oxide 400 milliGRAM(s) Oral once  meclizine 12.5 milliGRAM(s) Oral three times a day  montelukast 10 milliGRAM(s) Oral at bedtime  multivitamin 1 Tablet(s) Oral daily  timolol 0.5% Solution 1 Drop(s) Left EYE every 12 hours        PHYSICAL EXAM:   Vital Signs:  Vital Signs Last 24 Hrs  T(C): 36.8 (26 Dec 2021 06:54), Max: 37.1 (25 Dec 2021 14:00)  T(F): 98.2 (26 Dec 2021 06:54), Max: 98.8 (25 Dec 2021 14:00)  HR: 67 (26 Dec 2021 06:54) (62 - 88)  BP: 173/84 (26 Dec 2021 06:54) (138/70 - 173/84)  BP(mean): --  RR: 18 (26 Dec 2021 06:54) (16 - 18)  SpO2: 98% (26 Dec 2021 06:54) (98% - 98%)  Daily     Daily     GENERAL:  No acute distress  HEENT:  NCAT, no scleral icterus  CHEST: no resp distress  HEART:  RRR  ABDOMEN:  Soft, non-tender, non-distended, normoactive bowel sounds, no masses  EXTREMITIES:  No cyanosis, clubbing, or edema  SKIN:  No rash/erythema/ecchymoses/petechiae/wounds/abscess/warm/dry  NEURO:  Alert and oriented x 3, no asterixis, no tremor    LABS:                        9.5    6.37  )-----------( 472      ( 26 Dec 2021 08:31 )             35.5     Mean Cell Volume: 75.5 fL (12-26-21 @ 08:31)    12-26    130<L>  |  103  |  9   ----------------------------<  87  4.2   |  18<L>  |  0.91    Ca    8.5      26 Dec 2021 08:31  Phos  2.7     12-26  Mg     1.80     12-26                  Imaging:

## 2021-12-27 LAB
ALBUMIN SERPL ELPH-MCNC: 2.5 G/DL — LOW (ref 3.3–5)
ALP SERPL-CCNC: 95 U/L — SIGNIFICANT CHANGE UP (ref 40–120)
ALT FLD-CCNC: 12 U/L — SIGNIFICANT CHANGE UP (ref 4–41)
ANION GAP SERPL CALC-SCNC: 7 MMOL/L — SIGNIFICANT CHANGE UP (ref 7–14)
AST SERPL-CCNC: 16 U/L — SIGNIFICANT CHANGE UP (ref 4–40)
BASOPHILS # BLD AUTO: 0.02 K/UL — SIGNIFICANT CHANGE UP (ref 0–0.2)
BASOPHILS NFR BLD AUTO: 0.3 % — SIGNIFICANT CHANGE UP (ref 0–2)
BILIRUB SERPL-MCNC: 0.4 MG/DL — SIGNIFICANT CHANGE UP (ref 0.2–1.2)
BUN SERPL-MCNC: 9 MG/DL — SIGNIFICANT CHANGE UP (ref 7–23)
CALCIUM SERPL-MCNC: 8.5 MG/DL — SIGNIFICANT CHANGE UP (ref 8.4–10.5)
CHLORIDE SERPL-SCNC: 104 MMOL/L — SIGNIFICANT CHANGE UP (ref 98–107)
CO2 SERPL-SCNC: 23 MMOL/L — SIGNIFICANT CHANGE UP (ref 22–31)
CREAT SERPL-MCNC: 1.09 MG/DL — SIGNIFICANT CHANGE UP (ref 0.5–1.3)
EOSINOPHIL # BLD AUTO: 0.23 K/UL — SIGNIFICANT CHANGE UP (ref 0–0.5)
EOSINOPHIL NFR BLD AUTO: 3.8 % — SIGNIFICANT CHANGE UP (ref 0–6)
GLUCOSE SERPL-MCNC: 82 MG/DL — SIGNIFICANT CHANGE UP (ref 70–99)
HCT VFR BLD CALC: 32.3 % — LOW (ref 39–50)
HGB BLD-MCNC: 9.1 G/DL — LOW (ref 13–17)
IANC: 3.28 K/UL — SIGNIFICANT CHANGE UP (ref 1.5–8.5)
IMM GRANULOCYTES NFR BLD AUTO: 0.5 % — SIGNIFICANT CHANGE UP (ref 0–1.5)
LYMPHOCYTES # BLD AUTO: 1.39 K/UL — SIGNIFICANT CHANGE UP (ref 1–3.3)
LYMPHOCYTES # BLD AUTO: 22.7 % — SIGNIFICANT CHANGE UP (ref 13–44)
MAGNESIUM SERPL-MCNC: 1.8 MG/DL — SIGNIFICANT CHANGE UP (ref 1.6–2.6)
MCHC RBC-ENTMCNC: 20.4 PG — LOW (ref 27–34)
MCHC RBC-ENTMCNC: 28.2 GM/DL — LOW (ref 32–36)
MCV RBC AUTO: 72.4 FL — LOW (ref 80–100)
MONOCYTES # BLD AUTO: 1.17 K/UL — HIGH (ref 0–0.9)
MONOCYTES NFR BLD AUTO: 19.1 % — HIGH (ref 2–14)
NEUTROPHILS # BLD AUTO: 3.28 K/UL — SIGNIFICANT CHANGE UP (ref 1.8–7.4)
NEUTROPHILS NFR BLD AUTO: 53.6 % — SIGNIFICANT CHANGE UP (ref 43–77)
NRBC # BLD: 0 /100 WBCS — SIGNIFICANT CHANGE UP
NRBC # FLD: 0 K/UL — SIGNIFICANT CHANGE UP
PHOSPHATE SERPL-MCNC: 3.4 MG/DL — SIGNIFICANT CHANGE UP (ref 2.5–4.5)
PLATELET # BLD AUTO: 509 K/UL — HIGH (ref 150–400)
POTASSIUM SERPL-MCNC: 3.9 MMOL/L — SIGNIFICANT CHANGE UP (ref 3.5–5.3)
POTASSIUM SERPL-SCNC: 3.9 MMOL/L — SIGNIFICANT CHANGE UP (ref 3.5–5.3)
PROT SERPL-MCNC: 7.4 G/DL — SIGNIFICANT CHANGE UP (ref 6–8.3)
RBC # BLD: 4.46 M/UL — SIGNIFICANT CHANGE UP (ref 4.2–5.8)
RBC # FLD: 20.4 % — HIGH (ref 10.3–14.5)
SODIUM SERPL-SCNC: 134 MMOL/L — LOW (ref 135–145)
WBC # BLD: 6.12 K/UL — SIGNIFICANT CHANGE UP (ref 3.8–10.5)
WBC # FLD AUTO: 6.12 K/UL — SIGNIFICANT CHANGE UP (ref 3.8–10.5)

## 2021-12-27 PROCEDURE — 74220 X-RAY XM ESOPHAGUS 1CNTRST: CPT | Mod: 26

## 2021-12-27 PROCEDURE — 99232 SBSQ HOSP IP/OBS MODERATE 35: CPT | Mod: GC

## 2021-12-27 RX ORDER — ACETAMINOPHEN 500 MG
1000 TABLET ORAL ONCE
Refills: 0 | Status: COMPLETED | OUTPATIENT
Start: 2021-12-27 | End: 2021-12-27

## 2021-12-27 RX ORDER — DORZOLAMIDE HYDROCHLORIDE TIMOLOL MALEATE 20; 5 MG/ML; MG/ML
1 SOLUTION/ DROPS OPHTHALMIC EVERY 12 HOURS
Refills: 0 | Status: DISCONTINUED | OUTPATIENT
Start: 2021-12-27 | End: 2021-12-31

## 2021-12-27 RX ORDER — BRIMONIDINE TARTRATE 2 MG/MG
1 SOLUTION/ DROPS OPHTHALMIC THREE TIMES A DAY
Refills: 0 | Status: DISCONTINUED | OUTPATIENT
Start: 2021-12-27 | End: 2021-12-31

## 2021-12-27 RX ORDER — TRAMADOL HYDROCHLORIDE 50 MG/1
25 TABLET ORAL EVERY 6 HOURS
Refills: 0 | Status: DISCONTINUED | OUTPATIENT
Start: 2021-12-27 | End: 2021-12-31

## 2021-12-27 RX ORDER — ASPIRIN/CALCIUM CARB/MAGNESIUM 324 MG
81 TABLET ORAL DAILY
Refills: 0 | Status: DISCONTINUED | OUTPATIENT
Start: 2021-12-27 | End: 2021-12-31

## 2021-12-27 RX ADMIN — Medication 1 DROP(S): at 05:10

## 2021-12-27 RX ADMIN — Medication 1 TABLET(S): at 11:34

## 2021-12-27 RX ADMIN — Medication 1 TABLET(S): at 18:56

## 2021-12-27 RX ADMIN — Medication 12.5 MILLIGRAM(S): at 14:07

## 2021-12-27 RX ADMIN — Medication 1 DROP(S): at 18:41

## 2021-12-27 RX ADMIN — Medication 1 DROP(S): at 18:40

## 2021-12-27 RX ADMIN — Medication 12.5 MILLIGRAM(S): at 21:11

## 2021-12-27 RX ADMIN — MONTELUKAST 10 MILLIGRAM(S): 4 TABLET, CHEWABLE ORAL at 21:12

## 2021-12-27 RX ADMIN — BUDESONIDE AND FORMOTEROL FUMARATE DIHYDRATE 2 PUFF(S): 160; 4.5 AEROSOL RESPIRATORY (INHALATION) at 10:19

## 2021-12-27 RX ADMIN — Medication 1 DROP(S): at 00:36

## 2021-12-27 RX ADMIN — FLUCONAZOLE 100 MILLIGRAM(S): 150 TABLET ORAL at 23:30

## 2021-12-27 RX ADMIN — BUDESONIDE AND FORMOTEROL FUMARATE DIHYDRATE 2 PUFF(S): 160; 4.5 AEROSOL RESPIRATORY (INHALATION) at 21:12

## 2021-12-27 RX ADMIN — CARVEDILOL PHOSPHATE 12.5 MILLIGRAM(S): 80 CAPSULE, EXTENDED RELEASE ORAL at 05:09

## 2021-12-27 RX ADMIN — LOSARTAN POTASSIUM 100 MILLIGRAM(S): 100 TABLET, FILM COATED ORAL at 05:09

## 2021-12-27 RX ADMIN — Medication 500 MILLIGRAM(S): at 05:09

## 2021-12-27 RX ADMIN — Medication 81 MILLIGRAM(S): at 18:41

## 2021-12-27 RX ADMIN — Medication 1 DROP(S): at 05:09

## 2021-12-27 RX ADMIN — LATANOPROST 1 DROP(S): 0.05 SOLUTION/ DROPS OPHTHALMIC; TOPICAL at 21:13

## 2021-12-27 RX ADMIN — ATORVASTATIN CALCIUM 40 MILLIGRAM(S): 80 TABLET, FILM COATED ORAL at 21:12

## 2021-12-27 RX ADMIN — CARVEDILOL PHOSPHATE 12.5 MILLIGRAM(S): 80 CAPSULE, EXTENDED RELEASE ORAL at 18:56

## 2021-12-27 RX ADMIN — BRIMONIDINE TARTRATE 1 DROP(S): 2 SOLUTION/ DROPS OPHTHALMIC at 23:30

## 2021-12-27 RX ADMIN — Medication 1 TABLET(S): at 05:08

## 2021-12-27 RX ADMIN — Medication 400 MILLIGRAM(S): at 16:09

## 2021-12-27 RX ADMIN — ENOXAPARIN SODIUM 40 MILLIGRAM(S): 100 INJECTION SUBCUTANEOUS at 11:33

## 2021-12-27 RX ADMIN — Medication 1 DROP(S): at 11:33

## 2021-12-27 RX ADMIN — Medication 12.5 MILLIGRAM(S): at 05:09

## 2021-12-27 NOTE — PROGRESS NOTE ADULT - SUBJECTIVE AND OBJECTIVE BOX
~~~~~~~~~~~~~~~~~~~~~~~~~~~~~~~~~~  Conrad Trejo, PGY2  Pager [phone] (NS) [phone] ([facility])  After 7: Night Float Pager  ~~~~~~~~~~~~~~~~~~~~~~~~~~~~~~~~~~    PROGRESS NOTE:     Patient is a 71y old  Male who presents with a chief complaint of dizziness, ear pain (27 Dec 2021 06:50)      SUBJECTIVE / OVERNIGHT EVENTS:  -No acute events  -C/o dizziness  -Covid exposure yesterday    ADDITIONAL REVIEW OF SYSTEMS:    MEDICATIONS  (STANDING):  artificial  tears Solution 1 Drop(s) Both EYES four times a day  aspirin enteric coated 81 milliGRAM(s) Oral daily  atorvastatin 40 milliGRAM(s) Oral at bedtime  budesonide 160 MICROgram(s)/formoterol 4.5 MICROgram(s) Inhaler 2 Puff(s) Inhalation two times a day  carvedilol 12.5 milliGRAM(s) Oral every 12 hours  diphenoxylate/atropine 1 Tablet(s) Oral two times a day  enoxaparin Injectable 40 milliGRAM(s) SubCutaneous daily  fluconAZOLE IVPB 200 milliGRAM(s) IV Intermittent every 24 hours  latanoprost 0.005% Ophthalmic Solution 1 Drop(s) Both EYES at bedtime  losartan 100 milliGRAM(s) Oral daily  magnesium oxide 400 milliGRAM(s) Oral once  meclizine 12.5 milliGRAM(s) Oral three times a day  montelukast 10 milliGRAM(s) Oral at bedtime  multivitamin 1 Tablet(s) Oral daily  timolol 0.5% Solution 1 Drop(s) Left EYE every 12 hours    MEDICATIONS  (PRN):  acetaminophen     Tablet .. 650 milliGRAM(s) Oral every 6 hours PRN Mild Pain (1 - 3), Moderate Pain (4 - 6)  ALBUTerol    90 MICROgram(s) HFA Inhaler 2 Puff(s) Inhalation every 6 hours PRN Shortness of Breath and/or Wheezing      CAPILLARY BLOOD GLUCOSE        I&O's Summary      PHYSICAL EXAM:  Vital Signs Last 24 Hrs  T(C): 37.2 (27 Dec 2021 07:15), Max: 37.2 (27 Dec 2021 07:15)  T(F): 99 (27 Dec 2021 07:15), Max: 99 (27 Dec 2021 07:15)  HR: 81 (27 Dec 2021 07:15) (64 - 89)  BP: 106/64 (27 Dec 2021 07:15) (106/64 - 136/72)  BP(mean): --  RR: 17 (27 Dec 2021 07:15) (17 - 18)  SpO2: 100% (27 Dec 2021 07:15) (97% - 100%)    CONSTITUTIONAL: NAD, well-developed  RESPIRATORY: Normal respiratory effort; lungs are clear to auscultation bilaterally  CARDIOVASCULAR: Regular rate and rhythm, normal S1 and S2, no murmur/rub/gallop; No lower extremity edema; Peripheral pulses are 2+ bilaterally  ABDOMEN: Nontender to palpation, normoactive bowel sounds, no rebound/guarding; No hepatosplenomegaly  MUSCLOSKELETAL: no clubbing or cyanosis of digits; no joint swelling or tenderness to palpation  PSYCH: A+O to person, place, and time; affect appropriate    LABS:                        9.1    6.12  )-----------( 509      ( 27 Dec 2021 07:22 )             32.3     12-27    134<L>  |  104  |  9   ----------------------------<  82  3.9   |  23  |  1.09    Ca    8.5      27 Dec 2021 07:22  Phos  3.4     12-27  Mg     1.80     12-27    TPro  7.4  /  Alb  2.5<L>  /  TBili  0.4  /  DBili  x   /  AST  16  /  ALT  12  /  AlkPhos  95  12-27                RADIOLOGY & ADDITIONAL TESTS:  Results Reviewed:   Imaging Personally Reviewed:  Electrocardiogram Personally Reviewed:    COORDINATION OF CARE:  Care Discussed with Consultants/Other Providers [Y/N]:  Prior or Outpatient Records Reviewed [Y/N]:

## 2021-12-27 NOTE — PROGRESS NOTE ADULT - SUBJECTIVE AND OBJECTIVE BOX
Gastroenterology/Hepatology Progress Note      Interval Events:   - patient initially scheduled for EGD today  - yesterday patient w/ covid exposure, placed on airborne isolation  - this morning tolerated apple sauce, jello w/o problems  - no new complaints     Allergies:  penicillin (Rash)      Hospital Medications:  acetaminophen     Tablet .. 650 milliGRAM(s) Oral every 6 hours PRN  ALBUTerol    90 MICROgram(s) HFA Inhaler 2 Puff(s) Inhalation every 6 hours PRN  artificial  tears Solution 1 Drop(s) Both EYES four times a day  aspirin enteric coated 81 milliGRAM(s) Oral daily  atorvastatin 40 milliGRAM(s) Oral at bedtime  budesonide 160 MICROgram(s)/formoterol 4.5 MICROgram(s) Inhaler 2 Puff(s) Inhalation two times a day  carvedilol 12.5 milliGRAM(s) Oral every 12 hours  diphenoxylate/atropine 1 Tablet(s) Oral two times a day  enoxaparin Injectable 40 milliGRAM(s) SubCutaneous daily  fluconAZOLE IVPB 200 milliGRAM(s) IV Intermittent every 24 hours  latanoprost 0.005% Ophthalmic Solution 1 Drop(s) Both EYES at bedtime  losartan 100 milliGRAM(s) Oral daily  magnesium oxide 400 milliGRAM(s) Oral once  meclizine 12.5 milliGRAM(s) Oral three times a day  montelukast 10 milliGRAM(s) Oral at bedtime  multivitamin 1 Tablet(s) Oral daily  timolol 0.5% Solution 1 Drop(s) Left EYE every 12 hours        PHYSICAL EXAM:   Vital Signs:  Vital Signs Last 24 Hrs  T(C): 37.1 (27 Dec 2021 05:03), Max: 37.1 (26 Dec 2021 22:13)  T(F): 98.8 (27 Dec 2021 05:03), Max: 98.8 (27 Dec 2021 05:03)  HR: 64 (27 Dec 2021 05:03) (64 - 89)  BP: 124/61 (27 Dec 2021 05:03) (109/77 - 173/84)  BP(mean): --  RR: 17 (27 Dec 2021 05:03) (17 - 18)  SpO2: 97% (27 Dec 2021 05:03) (97% - 100%)  Daily     Daily     GENERAL:  No acute distress  HEENT:  NCAT, + conjunctival injections  CHEST: no resp distress  HEART:  RRR  ABDOMEN:  Soft, non-tender, non-distended, normoactive bowel sounds, no masses  EXTREMITIES:  No cyanosis, clubbing, or edema  SKIN:  No rash/erythema/ecchymoses/petechiae/wounds/abscess/warm/dry  NEURO:  Alert and oriented x 3, no asterixis, no tremor    LABS:                        9.5    6.37  )-----------( 472      ( 26 Dec 2021 08:31 )             35.5     Mean Cell Volume: 75.5 fL (12-26-21 @ 08:31)    12-26    130<L>  |  103  |  9   ----------------------------<  87  4.2   |  18<L>  |  0.91    Ca    8.5      26 Dec 2021 08:31  Phos  2.7     12-26  Mg     1.80     12-26                  Imaging:

## 2021-12-27 NOTE — PROGRESS NOTE ADULT - PROBLEM SELECTOR PLAN 1
- pt with bilateral conjunctivitis   - no purulent eye discharge   - CT showing orbital proptosis - TSH wnl  - will continue latanoprost eye drops  - erythromycin + Ofloxacin per Optho  - R/o [facility] with CTA, performed 12/26, pending read  - timolol added for increase IOP of left eye

## 2021-12-27 NOTE — PROGRESS NOTE ADULT - ATTENDING COMMENTS
pt seen/examined at bedside.    71 M, UC, a/w dizziness, b/l conjunctivitis, otitis externa.  Dysphagia, improving, barium swallow this AM. EGD this admission vs o/p.  COVID exposure, monitoring.      PT-->AKUA, discharge planning

## 2021-12-27 NOTE — PROGRESS NOTE ADULT - ATTENDING COMMENTS
Patient with dysphagia.  Pt ate and has a COVID exposure; so EGD cancelled today.  Will plan for EGD once clears COVID exposure protocols.

## 2021-12-27 NOTE — PROGRESS NOTE ADULT - ASSESSMENT
Impression:  70 yo M w/ PMHx COPD, afib, UC, DM, HTN admitted 12/18/21 w/ necrotizing otitis media, found to also have bacterial conjunctivitis; GI now consulted for dysphagia    # dysphagia - unclear etiology. Potential sources for dysphagia include obstructive etiologies and propulsion etiologies. Among obstructive etiologies, such as mass, stricture, ring - would expect more prolonged course, as well as progression of solids -> liquids. Zenkers possible as well. Dysmotility is the other class of disorders that may explain his dysphagia, such as achalasia vs other disorder of peristalsis. His recent MRI brain/head from 12/21/21 notable for nonspecific abnormal white matter foci likely from microvascular disease, and unlikely to explain current symptoms. Symptoms improving on empiric fluconazole treatment, will advance diet, and if ongoing symptoms can pursue barium esohpagram and EGD once covid resolved  # COVID exposure  # otitis media  # conjunctivitis  # UC - previously on remicade, follows w/ Dr. Purvis, last colonoscopy 11/2019 w/ severe inflammation and stricture at 30cm    Recommendations:  - advance diet to dysphagia diet, and then to regular as tolerated   - barium esophagram  - cancel EGD today   - pending above workup, may benefit from manometry  - c/w fluconazole x 14d  - rest of care per primary team    GI will continue to follow.     Billy Arboleda, PGY5  Gastroenterology/Hepatology Fellow  Available on [employer] Teams  [phone] ([employer] Short Range Pager)  [phone] (Long Range Pager)    After 5pm, please contact the on-call GI fellow. [phone]   Impression:  72 yo M w/ PMHx COPD, afib, UC, DM, HTN admitted 12/18/21 w/ necrotizing otitis media, found to also have bacterial conjunctivitis; GI now consulted for dysphagia    # dysphagia - unclear etiology. Potential sources for dysphagia include obstructive etiologies and propulsion etiologies. Among obstructive etiologies, such as mass, stricture, ring - would expect more prolonged course, as well as progression of solids -> liquids. Zenkers possible as well. Dysmotility is the other class of disorders that may explain his dysphagia, such as achalasia vs other disorder of peristalsis. His recent MRI brain/head from 12/21/21 notable for nonspecific abnormal white matter foci likely from microvascular disease, and unlikely to explain current symptoms. Symptoms improving on empiric fluconazole treatment, will advance diet, and if ongoing symptoms can pursue barium esohpagram and EGD once covid resolved  # COVID exposure  # otitis media  # conjunctivitis  # UC - previously on remicade, follows w/ Dr. Purvis, last colonoscopy 11/2019 w/ severe inflammation and stricture at 30cm    Recommendations:  - advance diet to dysphagia diet, and then to regular as tolerated   - barium esophagram  - cancel EGD today, please make NPO at [facility] for possible EGD tomorrow if cleared from covid protocol  - pending above workup, may benefit from manometry  - c/w fluconazole x 14d  - rest of care per primary team    GI will continue to follow.     Billy Arboleda, PGY5  Gastroenterology/Hepatology Fellow  Available on [employer] Teams  [phone] ([email or web address] Short Range Pager)  [phone] (Long Range Pager)    After 5pm, please contact the on-call GI fellow. [phone]

## 2021-12-27 NOTE — PROGRESS NOTE ADULT - PROBLEM SELECTOR PLAN 2
Less likely due to candidiasis but will treat empirically pending barium swallow. If negative will pursue EGD  -appreciate GI recs  -f/u barium swallow   -Dysphagia diet, advance as tolerated

## 2021-12-27 NOTE — PROGRESS NOTE ADULT - SUBJECTIVE AND OBJECTIVE BOX
[facility] DEPARTMENT OF OPHTHALMOLOGY  ------------------------------------------------------------------------------  Ella Martínez MD, MPH, PGY-3  Pager: [phone]  ------------------------------------------------------------------------------    NOTE STARTED BEFORE PT ENCOUNTER     Interval History: No acute events overnight. Today patient denying blurred vision, eye pain, flashes, floaters, FBS, erythema, or discharge.     MEDICATIONS  (STANDING):  artificial  tears Solution 1 Drop(s) Both EYES four times a day  aspirin  chewable 81 milliGRAM(s) Oral daily  atorvastatin 40 milliGRAM(s) Oral at bedtime  budesonide 160 MICROgram(s)/formoterol 4.5 MICROgram(s) Inhaler 2 Puff(s) Inhalation two times a day  carvedilol 12.5 milliGRAM(s) Oral every 12 hours  diphenoxylate/atropine 1 Tablet(s) Oral two times a day  enoxaparin Injectable 40 milliGRAM(s) SubCutaneous daily  fluconAZOLE IVPB 200 milliGRAM(s) IV Intermittent every 24 hours  latanoprost 0.005% Ophthalmic Solution 1 Drop(s) Both EYES at bedtime  losartan 100 milliGRAM(s) Oral daily  magnesium oxide 400 milliGRAM(s) Oral once  meclizine 12.5 milliGRAM(s) Oral three times a day  montelukast 10 milliGRAM(s) Oral at bedtime  multivitamin 1 Tablet(s) Oral daily  timolol 0.5% Solution 1 Drop(s) Left EYE every 12 hours    MEDICATIONS  (PRN):  acetaminophen     Tablet .. 650 milliGRAM(s) Oral every 6 hours PRN Mild Pain (1 - 3), Moderate Pain (4 - 6)  ALBUTerol    90 MICROgram(s) HFA Inhaler 2 Puff(s) Inhalation every 6 hours PRN Shortness of Breath and/or Wheezing      VITALS: T(C): 37.1 (12-27-21 @ 14:26)  T(F): 98.7 (12-27-21 @ 14:26), Max: 99 (12-27-21 @ 07:15)  HR: 64 (12-27-21 @ 14:26) (64 - 89)  BP: 139/55 (12-27-21 @ 14:26) (106/64 - 139/55)  RR:  (17 - 18)  SpO2:  (97% - 100%)  Wt(kg): --  General: AAO x 3, appropriate mood and affect    Ophthalmology Exam:  Visual acuity (sc): 20/20 OU  Pupils: PERRL OU, no APD  Ttono: 16 OU  Extraocular movements (EOMs): Full OU, no pain, no diplopia  Confrontational Visual Field (CVF): Full OU    Pen Light Exam (PLE)  External: Flat OU  Lids/Lashes/Lacrimal Ducts: Flat OU    Sclera/Conjunctiva: W+Q OU  Cornea: Cl OU  Anterior Chamber: D+F OU    Iris: Flat OU  Lens: Cl OU   [facility] DEPARTMENT OF OPHTHALMOLOGY  ------------------------------------------------------------------------------  Ella Martínez MD, MPH, PGY-3  Pager: [phone]  ------------------------------------------------------------------------------    Interval History: No acute events. Patient reports continued mild eye pain OS. No change in vision.     MEDICATIONS  (STANDING):  artificial  tears Solution 1 Drop(s) Both EYES four times a day  aspirin  chewable 81 milliGRAM(s) Oral daily  atorvastatin 40 milliGRAM(s) Oral at bedtime  budesonide 160 MICROgram(s)/formoterol 4.5 MICROgram(s) Inhaler 2 Puff(s) Inhalation two times a day  carvedilol 12.5 milliGRAM(s) Oral every 12 hours  diphenoxylate/atropine 1 Tablet(s) Oral two times a day  enoxaparin Injectable 40 milliGRAM(s) SubCutaneous daily  fluconAZOLE IVPB 200 milliGRAM(s) IV Intermittent every 24 hours  latanoprost 0.005% Ophthalmic Solution 1 Drop(s) Both EYES at bedtime  losartan 100 milliGRAM(s) Oral daily  magnesium oxide 400 milliGRAM(s) Oral once  meclizine 12.5 milliGRAM(s) Oral three times a day  montelukast 10 milliGRAM(s) Oral at bedtime  multivitamin 1 Tablet(s) Oral daily  timolol 0.5% Solution 1 Drop(s) Left EYE every 12 hours    MEDICATIONS  (PRN):  acetaminophen     Tablet .. 650 milliGRAM(s) Oral every 6 hours PRN Mild Pain (1 - 3), Moderate Pain (4 - 6)  ALBUTerol    90 MICROgram(s) HFA Inhaler 2 Puff(s) Inhalation every 6 hours PRN Shortness of Breath and/or Wheezing    VITALS: T(C): 37.1 (12-27-21 @ 14:26)  T(F): 98.7 (12-27-21 @ 14:26), Max: 99 (12-27-21 @ 07:15)  HR: 64 (12-27-21 @ 14:26) (64 - 89)  BP: 139/55 (12-27-21 @ 14:26) (106/64 - 139/55)  RR:  (17 - 18)  SpO2:  (97% - 100%)  Wt(kg): --  General: AAO x 3, appropriate mood and affect    Ophthalmology Exam:  Visual acuity (cc): 20/25 OU prior - no vision changes per patient on exam today   Pupils: PERRL OU, no APD  Ttono: 20, 30   Extraocular movements (EOMs): Full OU, no pain, no diplopia  Confrontational Visual Field (CVF): Full OU  No resistance to retropulsion OU, Trace proptosis Left eye    Slit Lamp Exam (SLE)  External: Flat OU  Lids/Lashes/Lacrimal Ducts: ectropion OD>OS. Scant discharge medially with possible small focal collection medial inferior lid OD - appears to be improving.   Sclera/Conjunctiva: 1-2+ injection OS> OD with few cork-screw like vessels noted on superior conjunctiva OS                                                                                                                                                                                                                                                                                                                                                                                                                                                                                                                                                                                Cornea: Cl OU  Anterior Chamber: appears shallow OS>OD  Iris: Flat OU  Lens: NS OU    Gonioscopy: limited view OD due to poor pt stabilization at slit lamp but able to view TM at least 180 OD, bare  OS     Assessment and Plan:     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 Ophthalmology consulted for eye redness OU Patient reports improvement, but today with persistent redness, cork-screw vessels, and elevated IOP    # Eye redness both eyes  - Etiology previously presumed dryness/irritation- given dry eye appearance with ectropion and improvement with drops  - Today with notable persistent injection and cork-screw vessels with elevated IOP Left 29   - Given persistent findings, worsening IOP - Previous scan with notable proptosis - recommended CT-A or MRA-brain to rule-out CCF - imaging study negative   - cw ofloxacin gtts QID, Erythromycin ointment qHS 5-7 days-  - Cw Artificial tears 4 times a day Both eyes  - Cw Warm compress TID    # glaucoma suspect vs. ocular HTN -- on gonio patient with suspicion for possible chronic angle closure glaucoma OS  - follows with Dr. Anil Crystal, last HVF, IOP reportedly normal per pt    - Can also consider elevated episcleral venous pressure in s/p dilated vessels (possible causes of increased  include congestive heart failure, vasculitis, thyroid ophthalmopathy) though no blood appreciated in Schlemm's canal on gonio and bare TM which is inconsistent with elevated .   - C/w xalatan QHS OU, STOP timolol, ADD cosopt BID OS, brimonidine TID OS  - Will re-check IOP tomorrow     Discussed with primary team and patient.     Dw Dr. Tigre Wynn, glaucoma specialist.     Outpatient follow-up: Patient should follow-up with his/her ophthalmologist or with [facility] Department of Ophthalmology at the address below     [address] [place] [address]. [address]  [place] [address]  [phone]    Ella Martínez MD, MPH PGY-3  Pager: [phone]  Google Voice: [phone]   Also available on Microsoft Teams     [facility] DEPARTMENT OF OPHTHALMOLOGY  ------------------------------------------------------------------------------  Ella Martínez MD, MPH, PGY-3  Pager: [phone]  ------------------------------------------------------------------------------    Interval History: No acute events. Patient reports continued mild eye pain OS. No change in vision.     MEDICATIONS  (STANDING):  artificial  tears Solution 1 Drop(s) Both EYES four times a day  aspirin  chewable 81 milliGRAM(s) Oral daily  atorvastatin 40 milliGRAM(s) Oral at bedtime  budesonide 160 MICROgram(s)/formoterol 4.5 MICROgram(s) Inhaler 2 Puff(s) Inhalation two times a day  carvedilol 12.5 milliGRAM(s) Oral every 12 hours  diphenoxylate/atropine 1 Tablet(s) Oral two times a day  enoxaparin Injectable 40 milliGRAM(s) SubCutaneous daily  fluconAZOLE IVPB 200 milliGRAM(s) IV Intermittent every 24 hours  latanoprost 0.005% Ophthalmic Solution 1 Drop(s) Both EYES at bedtime  losartan 100 milliGRAM(s) Oral daily  magnesium oxide 400 milliGRAM(s) Oral once  meclizine 12.5 milliGRAM(s) Oral three times a day  montelukast 10 milliGRAM(s) Oral at bedtime  multivitamin 1 Tablet(s) Oral daily  timolol 0.5% Solution 1 Drop(s) Left EYE every 12 hours    MEDICATIONS  (PRN):  acetaminophen     Tablet .. 650 milliGRAM(s) Oral every 6 hours PRN Mild Pain (1 - 3), Moderate Pain (4 - 6)  ALBUTerol    90 MICROgram(s) HFA Inhaler 2 Puff(s) Inhalation every 6 hours PRN Shortness of Breath and/or Wheezing    VITALS: T(C): 37.1 (12-27-21 @ 14:26)  T(F): 98.7 (12-27-21 @ 14:26), Max: 99 (12-27-21 @ 07:15)  HR: 64 (12-27-21 @ 14:26) (64 - 89)  BP: 139/55 (12-27-21 @ 14:26) (106/64 - 139/55)  RR:  (17 - 18)  SpO2:  (97% - 100%)  Wt(kg): --  General: AAO x 3, appropriate mood and affect    Ophthalmology Exam:  Visual acuity (cc): 20/25 OU prior - no vision changes per patient on exam today   Pupils: PERRL OU, no APD  Ttono: 20, 30   Extraocular movements (EOMs): Full OU, no pain, no diplopia  Confrontational Visual Field (CVF): Full OU  No resistance to retropulsion OU, Trace proptosis Left eye    Slit Lamp Exam (SLE)  External: Proptosis OS>OD  Lids/Lashes/Lacrimal Ducts: ectropion OD>OS. Scant discharge medially with possible small focal collection medial inferior lid OD - appears to be improving.   Sclera/Conjunctiva: 1-2+ injection OS> OD with few cork-screw like vessels noted on superior conjunctiva OS                                                                                                                                                                                                                                                                                                                                                                                                                                                                                                                                                                                Cornea: Cl OU  Anterior Chamber: appears shallow OS>OD  Iris: Flat OU  Lens: NS OU    Gonioscopy: limited view OD due to poor pt stabilization at slit lamp but able to view TM at least 180 OD, bare  OS     Assessment and Plan:     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 Ophthalmology consulted for eye redness OU Patient reports improvement, but today with persistent redness, cork-screw vessels, and elevated IOP    # Eye redness both eyes  - Etiology previously presumed dryness/irritation- given dry eye appearance with ectropion and improvement with drops  - Today with notable persistent injection and cork-screw vessels with elevated IOP Left 29   - Given persistent findings, worsening IOP - Previous scan with notable proptosis - recommended CT-A or MRA-brain to rule-out CCF - imaging study negative   - cw ofloxacin gtts QID, Erythromycin ointment qHS 5-7 days-  - Cw Artificial tears 4 times a day Both eyes  - Cw Warm compress TID    # glaucoma suspect vs. ocular HTN -- on gonio patient with suspicion for possible chronic angle closure glaucoma OS  - follows with Dr. Anil Crystal, last HVF, IOP reportedly normal per pt    - Can also consider elevated episcleral venous pressure in s/p dilated vessels (possible causes of increased  include congestive heart failure, vasculitis, thyroid ophthalmopathy) though no blood appreciated in Schlemm's canal on gonio and bare TM which is inconsistent with elevated .   - C/w xalatan QHS OU, STOP timolol, ADD cosopt BID OS, brimonidine TID OS  - Will re-check IOP tomorrow     # Proptosis OU   - CT orbits performed prior with evidence bilateral proptosis, no retrobulbar mass appreciated   - Advise thyroid function testing (TSH, T4/T3, TSI)    Discussed with primary team and patient.     Dw Dr. Tigre Wynn, glaucoma specialist.     Outpatient follow-up: Patient should follow-up with his/her ophthalmologist or with [facility] Department of Ophthalmology at the address below     [address] [place] [address]. [address]  [place] [address]  [phone]    Ella Martínez MD, MPH PGY-3  Pager: [phone]  Google Voice: [phone]   Also available on Microsoft Teams     [facility] DEPARTMENT OF OPHTHALMOLOGY  ------------------------------------------------------------------------------  Ella Martínez MD, MPH, PGY-3  Pager: [phone]  ------------------------------------------------------------------------------    Interval History: No acute events. Patient reports continued mild eye pain OS. No change in vision.     MEDICATIONS  (STANDING):  artificial  tears Solution 1 Drop(s) Both EYES four times a day  aspirin  chewable 81 milliGRAM(s) Oral daily  atorvastatin 40 milliGRAM(s) Oral at bedtime  budesonide 160 MICROgram(s)/formoterol 4.5 MICROgram(s) Inhaler 2 Puff(s) Inhalation two times a day  carvedilol 12.5 milliGRAM(s) Oral every 12 hours  diphenoxylate/atropine 1 Tablet(s) Oral two times a day  enoxaparin Injectable 40 milliGRAM(s) SubCutaneous daily  fluconAZOLE IVPB 200 milliGRAM(s) IV Intermittent every 24 hours  latanoprost 0.005% Ophthalmic Solution 1 Drop(s) Both EYES at bedtime  losartan 100 milliGRAM(s) Oral daily  magnesium oxide 400 milliGRAM(s) Oral once  meclizine 12.5 milliGRAM(s) Oral three times a day  montelukast 10 milliGRAM(s) Oral at bedtime  multivitamin 1 Tablet(s) Oral daily  timolol 0.5% Solution 1 Drop(s) Left EYE every 12 hours    MEDICATIONS  (PRN):  acetaminophen     Tablet .. 650 milliGRAM(s) Oral every 6 hours PRN Mild Pain (1 - 3), Moderate Pain (4 - 6)  ALBUTerol    90 MICROgram(s) HFA Inhaler 2 Puff(s) Inhalation every 6 hours PRN Shortness of Breath and/or Wheezing    VITALS: T(C): 37.1 (12-27-21 @ 14:26)  T(F): 98.7 (12-27-21 @ 14:26), Max: 99 (12-27-21 @ 07:15)  HR: 64 (12-27-21 @ 14:26) (64 - 89)  BP: 139/55 (12-27-21 @ 14:26) (106/64 - 139/55)  RR:  (17 - 18)  SpO2:  (97% - 100%)  Wt(kg): --  General: AAO x 3, appropriate mood and affect    Ophthalmology Exam:  Visual acuity (cc): 20/25 OU prior - no vision changes per patient on exam today   Pupils: PERRL OU, no APD  Ttono: 20, 30   Extraocular movements (EOMs): Full OU, no pain, no diplopia  Confrontational Visual Field (CVF): Full OU  No resistance to retropulsion OU, Proptosis OS>OD    Slit Lamp Exam (SLE)  External: Proptosis OS>OD  Lids/Lashes/Lacrimal Ducts: ectropion OD>OS. Scant discharge medially with possible small focal collection medial inferior lid OD - appears to be improving.   Sclera/Conjunctiva: 1-2+ injection OS> OD with few cork-screw like vessels noted on superior conjunctiva OS                                                                                                                                                                                                                                                                                                                                                                                                                                                                                                                                                                                Cornea: 1+ SPK OU, microcystic corneal edema OS  Anterior Chamber: appears shallow OS>OD  Iris: Flat OU  Lens: NS OU    Gonioscopy: limited view OD due to poor pt stabilization at slit lamp but able to view TM at least 180 OD, bare  OS (poor view in s/o microcystic corneal edema)    Assessment and Plan:     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 Ophthalmology consulted for eye redness OU Patient reports improvement, but today with persistent redness, cork-screw vessels, and elevated IOP    # Eye redness both eyes  - Etiology previously presumed dryness/irritation- given dry eye appearance with ectropion and improvement with drops  - Concern for corkscrew vessels OD, considered CCF   - Given persistent findings, worsening IOP - Previous scan with notable proptosis - recommended CT-A or MRA-brain to rule-out CCF - imaging study negative   - cw ofloxacin gtts QID, Erythromycin ointment qHS 5-7 days-  - Cw Artificial tears 4 times a day Both eyes  - Cw Warm compress TID    # glaucoma suspect vs. ocular HTN -- on gonio patient with suspicion for possible chronic angle closure glaucoma OS  - follows with Dr. Anil Crystal, last HVF, IOP reportedly normal per pt    - Can also consider elevated episcleral venous pressure in s/p dilated vessels (possible causes of increased  include congestive heart failure, vasculitis, thyroid ophthalmopathy) though no blood appreciated in Schlemm's canal on gonio and bare TM which is inconsistent with elevated .   - C/w xalatan QHS OU, STOP timolol, ADD cosopt BID OS, brimonidine TID OS  - Will re-check IOP tomorrow     # Proptosis OU   - CT orbits performed prior with evidence bilateral proptosis, no retrobulbar mass appreciated   - Advise thyroid function testing (TSH, T4/T3, TSI)    Discussed with primary team and patient.     Trevin Wynn, glaucoma specialist.     Outpatient follow-up: Patient should follow-up with his/her ophthalmologist or with [facility] Department of Ophthalmology at the address below     [address] [place] [address]. [address]  [place] [address]  [phone]    Ella Martínez MD, MPH PGY-3  Pager: [phone]  Google Voice: [phone]   Also available on Microsoft Teams     [facility] DEPARTMENT OF OPHTHALMOLOGY  ------------------------------------------------------------------------------  Ella Martínez MD, MPH, PGY-3  Pager: [phone]  ------------------------------------------------------------------------------    Interval History: No acute events. Patient reports continued mild eye pain OS. No change in vision.     MEDICATIONS  (STANDING):  artificial  tears Solution 1 Drop(s) Both EYES four times a day  aspirin  chewable 81 milliGRAM(s) Oral daily  atorvastatin 40 milliGRAM(s) Oral at bedtime  budesonide 160 MICROgram(s)/formoterol 4.5 MICROgram(s) Inhaler 2 Puff(s) Inhalation two times a day  carvedilol 12.5 milliGRAM(s) Oral every 12 hours  diphenoxylate/atropine 1 Tablet(s) Oral two times a day  enoxaparin Injectable 40 milliGRAM(s) SubCutaneous daily  fluconAZOLE IVPB 200 milliGRAM(s) IV Intermittent every 24 hours  latanoprost 0.005% Ophthalmic Solution 1 Drop(s) Both EYES at bedtime  losartan 100 milliGRAM(s) Oral daily  magnesium oxide 400 milliGRAM(s) Oral once  meclizine 12.5 milliGRAM(s) Oral three times a day  montelukast 10 milliGRAM(s) Oral at bedtime  multivitamin 1 Tablet(s) Oral daily  timolol 0.5% Solution 1 Drop(s) Left EYE every 12 hours    MEDICATIONS  (PRN):  acetaminophen     Tablet .. 650 milliGRAM(s) Oral every 6 hours PRN Mild Pain (1 - 3), Moderate Pain (4 - 6)  ALBUTerol    90 MICROgram(s) HFA Inhaler 2 Puff(s) Inhalation every 6 hours PRN Shortness of Breath and/or Wheezing    VITALS: T(C): 37.1 (12-27-21 @ 14:26)  T(F): 98.7 (12-27-21 @ 14:26), Max: 99 (12-27-21 @ 07:15)  HR: 64 (12-27-21 @ 14:26) (64 - 89)  BP: 139/55 (12-27-21 @ 14:26) (106/64 - 139/55)  RR:  (17 - 18)  SpO2:  (97% - 100%)  Wt(kg): --  General: AAO x 3, appropriate mood and affect    Ophthalmology Exam:  Visual acuity (cc): 20/25 OU prior - no vision changes per patient on exam today   Pupils: PERRL OU, no APD  Ttono: 20, 30   Extraocular movements (EOMs): Full OU, no pain, no diplopia  Confrontational Visual Field (CVF): Full OU  No resistance to retropulsion OU, Proptosis OS>OD    Slit Lamp Exam (SLE)  External: Proptosis OS>OD  Lids/Lashes/Lacrimal Ducts: ectropion OD>OS. Scant discharge medially with possible small focal collection medial inferior lid OD - appears to be improving.   Sclera/Conjunctiva: 1-2+ injection OS> OD with few cork-screw like vessels noted on superior conjunctiva OS                                                                                                                                                                                                                                                                                                                                                                                                                                                                                                                                                                                Cornea: 1+ SPK OU, microcystic corneal edema OS  Anterior Chamber: appears shallow OS>OD  Iris: Flat OU  Lens: NS OU    Gonioscopy: limited view OD due to poor pt stabilization at slit lamp but able to view TM at least 180 OD, bare  OS (poor view in s/o microcystic corneal edema)    Assessment and Plan:     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 Ophthalmology consulted for eye redness OU Patient reports improvement, but today with persistent redness, cork-screw vessels, and elevated IOP    # Eye redness both eyes  - Etiology previously presumed dryness/irritation- given dry eye appearance with ectropion and improvement with drops  - Concern for corkscrew vessels OD, considered CCF   - Given persistent findings, worsening IOP - Previous scan with notable proptosis - recommended CT-A or MRA-brain to rule-out CCF - imaging study negative   - cw ofloxacin gtts QID, Erythromycin ointment qHS 5-7 days-  - Cw Artificial tears 4 times a day Both eyes  - Cw Warm compress TID    # glaucoma suspect vs. ocular HTN -- on gonio patient with suspicion for possible chronic angle closure glaucoma OS  - follows with Dr. Anil Crystal, last HVF, IOP reportedly normal per pt    - Can also consider elevated episcleral venous pressure in s/p dilated vessels (possible causes of increased  include congestive heart failure, vasculitis, thyroid ophthalmopathy) though no blood appreciated in Schlemm's canal on gonio and bare TM which is inconsistent with elevated .   - C/w xalatan QHS OU, STOP timolol, ADD cosopt BID OS, brimonidine TID OS  - Will re-check IOP tomorrow     # Proptosis OU   - CT orbits performed prior with evidence bilateral proptosis, no retrobulbar mass appreciated   - Advise thyroid function testing (TSH, T4/T3, TSI)    Discussed with primary team and patient.     Trevin Wynn, glaucoma specialist.    Outpatient follow-up: Patient should follow-up with his/her ophthalmologist or with [facility] Department of Ophthalmology at the address below     [address] [place] [address]. [address]  [place] [address]  [phone]    Ella Martínez MD, MPH PGY-3  Pager: [phone]  Google Voice: [phone]   Also available on Microsoft Teams

## 2021-12-28 PROCEDURE — 99232 SBSQ HOSP IP/OBS MODERATE 35: CPT | Mod: GC

## 2021-12-28 RX ORDER — ACETAZOLAMIDE 250 MG/1
500 TABLET ORAL
Refills: 0 | Status: DISCONTINUED | OUTPATIENT
Start: 2021-12-28 | End: 2021-12-31

## 2021-12-28 RX ORDER — MECLIZINE HCL 12.5 MG
12.5 TABLET ORAL
Refills: 0 | Status: DISCONTINUED | OUTPATIENT
Start: 2021-12-28 | End: 2021-12-31

## 2021-12-28 RX ADMIN — DORZOLAMIDE HYDROCHLORIDE TIMOLOL MALEATE 1 DROP(S): 20; 5 SOLUTION/ DROPS OPHTHALMIC at 21:24

## 2021-12-28 RX ADMIN — Medication 650 MILLIGRAM(S): at 01:58

## 2021-12-28 RX ADMIN — ACETAZOLAMIDE 500 MILLIGRAM(S): 250 TABLET ORAL at 19:30

## 2021-12-28 RX ADMIN — Medication 1 DROP(S): at 06:21

## 2021-12-28 RX ADMIN — Medication 1 DROP(S): at 00:09

## 2021-12-28 RX ADMIN — Medication 1 TABLET(S): at 22:03

## 2021-12-28 RX ADMIN — Medication 1 DROP(S): at 13:06

## 2021-12-28 RX ADMIN — LATANOPROST 1 DROP(S): 0.05 SOLUTION/ DROPS OPHTHALMIC; TOPICAL at 21:27

## 2021-12-28 RX ADMIN — Medication 1 DROP(S): at 18:29

## 2021-12-28 RX ADMIN — Medication 1 TABLET(S): at 06:11

## 2021-12-28 RX ADMIN — BUDESONIDE AND FORMOTEROL FUMARATE DIHYDRATE 2 PUFF(S): 160; 4.5 AEROSOL RESPIRATORY (INHALATION) at 21:28

## 2021-12-28 RX ADMIN — Medication 650 MILLIGRAM(S): at 01:21

## 2021-12-28 RX ADMIN — Medication 12.5 MILLIGRAM(S): at 13:06

## 2021-12-28 RX ADMIN — BRIMONIDINE TARTRATE 1 DROP(S): 2 SOLUTION/ DROPS OPHTHALMIC at 06:07

## 2021-12-28 RX ADMIN — LOSARTAN POTASSIUM 100 MILLIGRAM(S): 100 TABLET, FILM COATED ORAL at 06:08

## 2021-12-28 RX ADMIN — CARVEDILOL PHOSPHATE 12.5 MILLIGRAM(S): 80 CAPSULE, EXTENDED RELEASE ORAL at 06:08

## 2021-12-28 RX ADMIN — Medication 650 MILLIGRAM(S): at 15:05

## 2021-12-28 RX ADMIN — BRIMONIDINE TARTRATE 1 DROP(S): 2 SOLUTION/ DROPS OPHTHALMIC at 22:17

## 2021-12-28 RX ADMIN — ENOXAPARIN SODIUM 40 MILLIGRAM(S): 100 INJECTION SUBCUTANEOUS at 13:06

## 2021-12-28 RX ADMIN — Medication 650 MILLIGRAM(S): at 15:42

## 2021-12-28 RX ADMIN — DORZOLAMIDE HYDROCHLORIDE TIMOLOL MALEATE 1 DROP(S): 20; 5 SOLUTION/ DROPS OPHTHALMIC at 06:40

## 2021-12-28 RX ADMIN — Medication 1 TABLET(S): at 13:06

## 2021-12-28 RX ADMIN — Medication 81 MILLIGRAM(S): at 13:06

## 2021-12-28 RX ADMIN — Medication 12.5 MILLIGRAM(S): at 06:08

## 2021-12-28 RX ADMIN — Medication 12.5 MILLIGRAM(S): at 18:29

## 2021-12-28 RX ADMIN — CARVEDILOL PHOSPHATE 12.5 MILLIGRAM(S): 80 CAPSULE, EXTENDED RELEASE ORAL at 18:29

## 2021-12-28 RX ADMIN — ATORVASTATIN CALCIUM 40 MILLIGRAM(S): 80 TABLET, FILM COATED ORAL at 22:16

## 2021-12-28 RX ADMIN — BUDESONIDE AND FORMOTEROL FUMARATE DIHYDRATE 2 PUFF(S): 160; 4.5 AEROSOL RESPIRATORY (INHALATION) at 09:45

## 2021-12-28 RX ADMIN — MONTELUKAST 10 MILLIGRAM(S): 4 TABLET, CHEWABLE ORAL at 21:27

## 2021-12-28 RX ADMIN — BRIMONIDINE TARTRATE 1 DROP(S): 2 SOLUTION/ DROPS OPHTHALMIC at 13:07

## 2021-12-28 RX ADMIN — FLUCONAZOLE 100 MILLIGRAM(S): 150 TABLET ORAL at 21:24

## 2021-12-28 NOTE — PROGRESS NOTE ADULT - PROBLEM SELECTOR PLAN 2
Less likely due to candidiasis but will treat empirically pending barium swallow. If negative will pursue EGD  -appreciate GI recs  -f/u barium swallow   -Dysphagia diet, advance as tolerated Less likely due to candidiasis but will treat empirically pending barium swallow. If negative will pursue EGD  -appreciate GI recs  -Barium swallow with evidence of nonperistaltic tertiary esophageal contractions  -Dysphagia diet, advance as tolerated

## 2021-12-28 NOTE — PROGRESS NOTE ADULT - SUBJECTIVE AND OBJECTIVE BOX
Interval Events:   No acute events overnight.  Patient denies any acute symptoms at this time.  He states he is having dysphagia, however only to "pills."    ROS:   12 point review of systems performed and negative except otherwise noted in HPI.    Hospital Medications:  acetaminophen     Tablet .. 650 milliGRAM(s) Oral every 6 hours PRN  ALBUTerol    90 MICROgram(s) HFA Inhaler 2 Puff(s) Inhalation every 6 hours PRN  artificial  tears Solution 1 Drop(s) Both EYES four times a day  aspirin  chewable 81 milliGRAM(s) Oral daily  atorvastatin 40 milliGRAM(s) Oral at bedtime  brimonidine 0.2% Ophthalmic Solution 1 Drop(s) Left EYE three times a day  budesonide 160 MICROgram(s)/formoterol 4.5 MICROgram(s) Inhaler 2 Puff(s) Inhalation two times a day  carvedilol 12.5 milliGRAM(s) Oral every 12 hours  diphenoxylate/atropine 1 Tablet(s) Oral two times a day  dorzolamide 2%/timolol 0.5% Ophthalmic Solution 1 Drop(s) Left EYE every 12 hours  enoxaparin Injectable 40 milliGRAM(s) SubCutaneous daily  fluconAZOLE IVPB 200 milliGRAM(s) IV Intermittent every 24 hours  latanoprost 0.005% Ophthalmic Solution 1 Drop(s) Both EYES at bedtime  losartan 100 milliGRAM(s) Oral daily  magnesium oxide 400 milliGRAM(s) Oral once  meclizine 12.5 milliGRAM(s) Oral three times a day  montelukast 10 milliGRAM(s) Oral at bedtime  multivitamin 1 Tablet(s) Oral daily  traMADol 25 milliGRAM(s) Oral every 6 hours PRN      PHYSICAL EXAM:   Vital Signs:  Vital Signs Last 24 Hrs  T(C): 36.9 (28 Dec 2021 06:41), Max: 37.1 (27 Dec 2021 14:26)  T(F): 98.5 (28 Dec 2021 06:41), Max: 98.8 (27 Dec 2021 20:59)  HR: 73 (28 Dec 2021 06:41) (64 - 96)  BP: 137/65 (28 Dec 2021 06:41) (113/81 - 139/55)  BP(mean): --  RR: 17 (28 Dec 2021 06:41) (17 - 18)  SpO2: 96% (28 Dec 2021 06:41) (96% - 100%)  Daily     Daily     GENERAL: no acute distress  NEURO: alert  HEENT: anicteric sclera, no conjunctival pallor appreciated  CHEST: no respiratory distress, no accessory muscle use  CARDIAC: regular rate, +S1/S2  ABDOMEN: soft, nondistended, nontender, no rebound or guarding  EXTREMITIES: warm, well perfused, no edema  SKIN: no lesions noted    LABS: reviewed                        9.1    6.12  )-----------( 509      ( 27 Dec 2021 07:22 )             32.3     12-27    134<L>  |  104  |  9   ----------------------------<  82  3.9   |  23  |  1.09    Ca    8.5      27 Dec 2021 07:22  Phos  3.4     12-27  Mg     1.80     12-27    TPro  7.4  /  Alb  2.5<L>  /  TBili  0.4  /  DBili  x   /  AST  16  /  ALT  12  /  AlkPhos  95  12-27    LIVER FUNCTIONS - ( 27 Dec 2021 07:22 )  Alb: 2.5 g/dL / Pro: 7.4 g/dL / ALK PHOS: 95 U/L / ALT: 12 U/L / AST: 16 U/L / GGT: x             Interval Diagnostic Studies: see sunrise for full report

## 2021-12-28 NOTE — PROGRESS NOTE ADULT - PROBLEM SELECTOR PLAN 1
- pt with bilateral conjunctivitis   - no purulent eye discharge   - CT showing orbital proptosis - TSH wnl  - will continue latanoprost eye drops  - erythromycin + Ofloxacin per Optho  - R/o CCF with CTA, performed 12/26, pending read  - brimonidine and timlolol/dorzalamide added for increase IOP of left eye

## 2021-12-28 NOTE — PROGRESS NOTE ADULT - ATTENDING COMMENTS
Pt seen/examined at bedside.    71 M, UC, a/w dizziness, b/l conjunctivitis, otitis externa.  Dysphagia, improving, tolerating regular diet.  EGD o/p.  COVID exposure, monitoring.  Optho input appreciated.  Concern for chronic angle glaucoma.      PT-->AKUA, discharge planning

## 2021-12-28 NOTE — PROGRESS NOTE ADULT - ATTENDING COMMENTS
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 Ophthalmology consulted for eye redness OU Patient reports improvement, but today with persistent redness, cork-screw vessels, and elevated IOP    # Eye redness both eyes  - Etiology previously presumed dryness/irritation- given dry eye appearance with ectropion and improvement with drops  - Per prior note, corkscrew vessels noted with concern for CCF -   - Given persistent findings, worsening IOP - Previous scan with notable proptosis - recommended CT-A or MRA-brain to rule-out CCF - imaging study negative   - cw ofloxacin gtts QID, Erythromycin ointment qHS 5-7 days-  - Cw Artificial tears 4 times a day Both eyes  - Cw Warm compress TID    # glaucoma suspect vs. ocular HTN -- on gonio patient with suspicion for possible chronic angle closure glaucoma OS  - follows with Dr. Anil Crystal, last HVF, IOP reportedly normal per pt    - Can also consider elevated episcleral venous pressure in s/p dilated vessels (possible causes of increased  include congestive heart failure, vasculitis, thyroid ophthalmopathy) though no blood appreciated in Schlemm's canal on gonio and bare TM which is inconsistent with elevated .   - C/w xalatan QHS OU, STOP timolol, ADD cosopt BID OS, brimonidine TID OS -- continue with current plan but given elevated IOP would add diamox 500mg BID as long as renal function OK   - Will re-check IOP tomorrow   - Will need close f/u as outpatient - need to ensure that short term rehab has ability to bring patient to apt by early next week. Needs to see a glaucoma specialist,, consider possible LPI in s/o narrow angles - patient likely has component of chronic angle closure glaucoma though limited view in s/o microcystic corneal edema OS    # Proptosis OU   - CT orbits performed prior with evidence bilateral proptosis, no retrobulbar mass appreciated   - Advise thyroid function testing (TSH, T4/T3, TSI)

## 2021-12-28 NOTE — PROGRESS NOTE ADULT - ATTENDING COMMENTS
Plan for EGD when cleared for COVID exposure protocol.  Given he is tolerating a regular diet, we can do this as an outpt.

## 2021-12-28 NOTE — PROGRESS NOTE ADULT - SUBJECTIVE AND OBJECTIVE BOX
~~~~~~~~~~~~~~~~~~~~~~~~~~~~~~~~~~  Conrad Trejo, PGY2  Pager [phone] (NS) [phone] ([facility])  After 7: Night Float Pager  ~~~~~~~~~~~~~~~~~~~~~~~~~~~~~~~~~~    PROGRESS NOTE:     Patient is a 71y old  Male who presents with a chief complaint of dizziness, ear pain (27 Dec 2021 14:46)      SUBJECTIVE / OVERNIGHT EVENTS:  -No acute events overnight  -persistently elevated IOP of left eye    ADDITIONAL REVIEW OF SYSTEMS:    MEDICATIONS  (STANDING):  artificial  tears Solution 1 Drop(s) Both EYES four times a day  aspirin  chewable 81 milliGRAM(s) Oral daily  atorvastatin 40 milliGRAM(s) Oral at bedtime  brimonidine 0.2% Ophthalmic Solution 1 Drop(s) Left EYE three times a day  budesonide 160 MICROgram(s)/formoterol 4.5 MICROgram(s) Inhaler 2 Puff(s) Inhalation two times a day  carvedilol 12.5 milliGRAM(s) Oral every 12 hours  diphenoxylate/atropine 1 Tablet(s) Oral two times a day  dorzolamide 2%/timolol 0.5% Ophthalmic Solution 1 Drop(s) Left EYE every 12 hours  enoxaparin Injectable 40 milliGRAM(s) SubCutaneous daily  fluconAZOLE IVPB 200 milliGRAM(s) IV Intermittent every 24 hours  latanoprost 0.005% Ophthalmic Solution 1 Drop(s) Both EYES at bedtime  losartan 100 milliGRAM(s) Oral daily  magnesium oxide 400 milliGRAM(s) Oral once  meclizine 12.5 milliGRAM(s) Oral three times a day  montelukast 10 milliGRAM(s) Oral at bedtime  multivitamin 1 Tablet(s) Oral daily    MEDICATIONS  (PRN):  acetaminophen     Tablet .. 650 milliGRAM(s) Oral every 6 hours PRN Mild Pain (1 - 3), Moderate Pain (4 - 6)  ALBUTerol    90 MICROgram(s) HFA Inhaler 2 Puff(s) Inhalation every 6 hours PRN Shortness of Breath and/or Wheezing  traMADol 25 milliGRAM(s) Oral every 6 hours PRN Moderate Pain (4 - 6)      CAPILLARY BLOOD GLUCOSE        I&O's Summary    27 Dec 2021 07:01  -  28 Dec 2021 05:55  --------------------------------------------------------  IN: 920 mL / OUT: 500 mL / NET: 420 mL        PHYSICAL EXAM:  Vital Signs Last 24 Hrs  T(C): 37.1 (27 Dec 2021 20:59), Max: 37.2 (27 Dec 2021 07:15)  T(F): 98.8 (27 Dec 2021 20:59), Max: 99 (27 Dec 2021 07:15)  HR: 74 (27 Dec 2021 20:59) (64 - 96)  BP: 122/68 (27 Dec 2021 20:59) (106/64 - 139/55)  BP(mean): --  RR: 17 (27 Dec 2021 20:59) (17 - 18)  SpO2: 99% (27 Dec 2021 20:59) (99% - 100%)    CONSTITUTIONAL: NAD, well-developed  RESPIRATORY: Normal respiratory effort; lungs are clear to auscultation bilaterally  CARDIOVASCULAR: Regular rate and rhythm, normal S1 and S2, no murmur/rub/gallop; No lower extremity edema; Peripheral pulses are 2+ bilaterally  ABDOMEN: Nontender to palpation, normoactive bowel sounds, no rebound/guarding; No hepatosplenomegaly  MUSCLOSKELETAL: no clubbing or cyanosis of digits; no joint swelling or tenderness to palpation  PSYCH: A+O to person, place, and time; affect appropriate    LABS:                        9.1    6.12  )-----------( 509      ( 27 Dec 2021 07:22 )             32.3     12-27    134<L>  |  104  |  9   ----------------------------<  82  3.9   |  23  |  1.09    Ca    8.5      27 Dec 2021 07:22  Phos  3.4     12-27  Mg     1.80     12-27    TPro  7.4  /  Alb  2.5<L>  /  TBili  0.4  /  DBili  x   /  AST  16  /  ALT  12  /  AlkPhos  95  12-27                RADIOLOGY & ADDITIONAL TESTS:  Results Reviewed:   Imaging Personally Reviewed:  Electrocardiogram Personally Reviewed:    COORDINATION OF CARE:  Care Discussed with Consultants/Other Providers [Y/N]:  Prior or Outpatient Records Reviewed [Y/N]:   ~~~~~~~~~~~~~~~~~~~~~~~~~~~~~~~~~~  Conrad Trejo, PGY2  Pager [phone] (NS) [phone] ([facility])  After 7: Night Float Pager  ~~~~~~~~~~~~~~~~~~~~~~~~~~~~~~~~~~    PROGRESS NOTE:     Patient is a 71y old  Male who presents with a chief complaint of dizziness, ear pain (27 Dec 2021 14:46)      SUBJECTIVE / OVERNIGHT EVENTS:  -No acute events overnight  -persistently elevated IOP of left eye  -Barium esophagram yesterday with evidence of nonperistaltic tertiary contractions, tolerated bite sized diet yesterday    ADDITIONAL REVIEW OF SYSTEMS:    MEDICATIONS  (STANDING):  artificial  tears Solution 1 Drop(s) Both EYES four times a day  aspirin  chewable 81 milliGRAM(s) Oral daily  atorvastatin 40 milliGRAM(s) Oral at bedtime  brimonidine 0.2% Ophthalmic Solution 1 Drop(s) Left EYE three times a day  budesonide 160 MICROgram(s)/formoterol 4.5 MICROgram(s) Inhaler 2 Puff(s) Inhalation two times a day  carvedilol 12.5 milliGRAM(s) Oral every 12 hours  diphenoxylate/atropine 1 Tablet(s) Oral two times a day  dorzolamide 2%/timolol 0.5% Ophthalmic Solution 1 Drop(s) Left EYE every 12 hours  enoxaparin Injectable 40 milliGRAM(s) SubCutaneous daily  fluconAZOLE IVPB 200 milliGRAM(s) IV Intermittent every 24 hours  latanoprost 0.005% Ophthalmic Solution 1 Drop(s) Both EYES at bedtime  losartan 100 milliGRAM(s) Oral daily  magnesium oxide 400 milliGRAM(s) Oral once  meclizine 12.5 milliGRAM(s) Oral three times a day  montelukast 10 milliGRAM(s) Oral at bedtime  multivitamin 1 Tablet(s) Oral daily    MEDICATIONS  (PRN):  acetaminophen     Tablet .. 650 milliGRAM(s) Oral every 6 hours PRN Mild Pain (1 - 3), Moderate Pain (4 - 6)  ALBUTerol    90 MICROgram(s) HFA Inhaler 2 Puff(s) Inhalation every 6 hours PRN Shortness of Breath and/or Wheezing  traMADol 25 milliGRAM(s) Oral every 6 hours PRN Moderate Pain (4 - 6)      CAPILLARY BLOOD GLUCOSE        I&O's Summary    27 Dec 2021 07:01  -  28 Dec 2021 05:55  --------------------------------------------------------  IN: 920 mL / OUT: 500 mL / NET: 420 mL        PHYSICAL EXAM:  Vital Signs Last 24 Hrs  T(C): 37.1 (27 Dec 2021 20:59), Max: 37.2 (27 Dec 2021 07:15)  T(F): 98.8 (27 Dec 2021 20:59), Max: 99 (27 Dec 2021 07:15)  HR: 74 (27 Dec 2021 20:59) (64 - 96)  BP: 122/68 (27 Dec 2021 20:59) (106/64 - 139/55)  BP(mean): --  RR: 17 (27 Dec 2021 20:59) (17 - 18)  SpO2: 99% (27 Dec 2021 20:59) (99% - 100%)    CONSTITUTIONAL: NAD, well-developed  RESPIRATORY: Normal respiratory effort; lungs are clear to auscultation bilaterally  CARDIOVASCULAR: Regular rate and rhythm, normal S1 and S2, no murmur/rub/gallop; No lower extremity edema; Peripheral pulses are 2+ bilaterally  ABDOMEN: Nontender to palpation, normoactive bowel sounds, no rebound/guarding; No hepatosplenomegaly  MUSCLOSKELETAL: no clubbing or cyanosis of digits; no joint swelling or tenderness to palpation  PSYCH: A+O to person, place, and time; affect appropriate    LABS:                        9.1    6.12  )-----------( 509      ( 27 Dec 2021 07:22 )             32.3     12-27    134<L>  |  104  |  9   ----------------------------<  82  3.9   |  23  |  1.09    Ca    8.5      27 Dec 2021 07:22  Phos  3.4     12-27  Mg     1.80     12-27    TPro  7.4  /  Alb  2.5<L>  /  TBili  0.4  /  DBili  x   /  AST  16  /  ALT  12  /  AlkPhos  95  12-27                RADIOLOGY & ADDITIONAL TESTS:  Results Reviewed:   Imaging Personally Reviewed:  Electrocardiogram Personally Reviewed:    COORDINATION OF CARE:  Care Discussed with Consultants/Other Providers [Y/N]:  Prior or Outpatient Records Reviewed [Y/N]:

## 2021-12-28 NOTE — PROGRESS NOTE ADULT - ASSESSMENT
Impression:  70 yo M w/ PMHx COPD, afib, UC, DM, HTN admitted 12/18/21 w/ necrotizing otitis media, found to also have bacterial conjunctivitis; GI now consulted for dysphagia    # dysphagia - unclear etiology. Potential sources for dysphagia include obstructive etiologies and propulsion etiologies. Among obstructive etiologies, such as mass, stricture, ring - would expect more prolonged course, as well as progression of solids -> liquids. Zenkers possible as well. Dysmotility is the other class of disorders that may explain his dysphagia, such as achalasia vs other disorder of peristalsis. His recent MRI brain/head from 12/21/21 notable for nonspecific abnormal white matter foci likely from microvascular disease, and unlikely to explain current symptoms. Symptoms improving on empiric fluconazole treatment, will advance diet, and if ongoing symptoms can pursue barium esohpagram and EGD once covid resolved  # COVID exposure  # otitis media  # conjunctivitis  # UC - previously on remicade, follows w/ Dr. Purvis, last colonoscopy 11/2019 w/ severe inflammation and stricture at 30cm    Recommendations:  - advance diet to dysphagia diet, and then to regular as tolerated   - barium esophagram reveals tablet unable to pass from distal esophagus on study  - endoscopy difficult as patient on COVID exposure protocol until 1/2/2021  - pending above workup, may benefit from manometry  - c/w fluconazole x 14d  - rest of care per primary team      Conrad Gonsales, PGY-4  GI/Hepatology Fellow    MONDAY-FRIDAY 8AM-5PM:  Pager# [phone] ([facility]) or [phone] ([facility])    NON-URGENT CONSULTS:  Please email [email or web address] OR [email or web address]  AT NIGHT AND ON WEEKENDS:  Contact on-call GI fellow via answering service ([phone]) from 5pm-8am and on weekends/holidays

## 2021-12-28 NOTE — PROGRESS NOTE ADULT - SUBJECTIVE AND OBJECTIVE BOX
[facility] DEPARTMENT OF OPHTHALMOLOGY  ------------------------------------------------------------------------------  Ella Martínez MD, MPH, PGY-3  Pager: [phone]  ------------------------------------------------------------------------------    Interval History: No acute events overnight. Today patient denying blurred vision, eye pain, flashes, floaters, FBS, erythema, or discharge.     MEDICATIONS  (STANDING):  acetaZOLAMIDE    Tablet 500 milliGRAM(s) Oral two times a day  artificial  tears Solution 1 Drop(s) Both EYES four times a day  aspirin  chewable 81 milliGRAM(s) Oral daily  atorvastatin 40 milliGRAM(s) Oral at bedtime  brimonidine 0.2% Ophthalmic Solution 1 Drop(s) Left EYE three times a day  budesonide 160 MICROgram(s)/formoterol 4.5 MICROgram(s) Inhaler 2 Puff(s) Inhalation two times a day  carvedilol 12.5 milliGRAM(s) Oral every 12 hours  diphenoxylate/atropine 1 Tablet(s) Oral two times a day  dorzolamide 2%/timolol 0.5% Ophthalmic Solution 1 Drop(s) Left EYE every 12 hours  enoxaparin Injectable 40 milliGRAM(s) SubCutaneous daily  fluconAZOLE IVPB 200 milliGRAM(s) IV Intermittent every 24 hours  latanoprost 0.005% Ophthalmic Solution 1 Drop(s) Both EYES at bedtime  losartan 100 milliGRAM(s) Oral daily  magnesium oxide 400 milliGRAM(s) Oral once  meclizine 12.5 milliGRAM(s) Oral two times a day  montelukast 10 milliGRAM(s) Oral at bedtime  multivitamin 1 Tablet(s) Oral daily    MEDICATIONS  (PRN):  acetaminophen     Tablet .. 650 milliGRAM(s) Oral every 6 hours PRN Mild Pain (1 - 3), Moderate Pain (4 - 6)  ALBUTerol    90 MICROgram(s) HFA Inhaler 2 Puff(s) Inhalation every 6 hours PRN Shortness of Breath and/or Wheezing  traMADol 25 milliGRAM(s) Oral every 6 hours PRN Moderate Pain (4 - 6)    VITALS: T(C): 37.2 (12-28-21 @ 16:00)  T(F): 99 (12-28-21 @ 16:00), Max: 99 (12-28-21 @ 16:00)  HR: 85 (12-28-21 @ 18:28) (73 - 85)  BP: 146/64 (12-28-21 @ 18:28) (122/68 - 146/64)  RR:  (17 - 17)  SpO2:  (96% - 99%)  Wt(kg): --  General: AAO x 3, appropriate mood and affect    Ophthalmology Exam:  Visual acuity (cc): 20/20 OD, 20/30 OS   Pupils: PERRL OU, no APD  Ttono: 20, 32   Extraocular movements (EOMs): Full OU, no pain, no diplopia  Confrontational Visual Field (CVF): Full OU  No resistance to retropulsion OU, Proptosis OS>OD    Slit Lamp Exam (SLE)  External: Proptosis OS>OD  Lids/Lashes/Lacrimal Ducts: ectropion OD>OS. Scant discharge medially with possible small focal collection medial inferior lid OD - appears to be improving.   Sclera/Conjunctiva: 1-2+ injection OS> OD with few cork-screw like vessels noted on superior conjunctiva OS                                                                                                                                                                                                                                                                                                                                                                                                                                                                                                                                                                                Cornea: 1+ SPK OU, microcystic corneal edema OS  Anterior Chamber: appears shallow OS>OD  Iris: Flat OU  Lens: NS OU    Gonioscopy 12/27/21: limited view OD due to poor pt stabilization at slit lamp but able to view TM at least 180 OD, bare  OS     Assessment and Plan:     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 Ophthalmology consulted for eye redness OU Patient reports improvement, but today with persistent redness, cork-screw vessels, and elevated IOP    # Eye redness both eyes  - Etiology previously presumed dryness/irritation- given dry eye appearance with ectropion and improvement with drops  - Per prior note, corkscrew vessels noted with concern for CCF -   - Given persistent findings, worsening IOP - Previous scan with notable proptosis - recommended CT-A or MRA-brain to rule-out CCF - imaging study negative   - cw ofloxacin gtts QID, Erythromycin ointment qHS 5-7 days-  - Cw Artificial tears 4 times a day Both eyes  - Cw Warm compress TID    # glaucoma suspect vs. ocular HTN -- on gonio patient with suspicion for possible chronic angle closure glaucoma OS  - follows with Dr. Anil Crystal, last HVF, IOP reportedly normal per pt    - Can also consider elevated episcleral venous pressure in s/p dilated vessels (possible causes of increased  include congestive heart failure, vasculitis, thyroid ophthalmopathy) though no blood appreciated in Schlemm's canal on gonio and bare TM which is inconsistent with elevated .   - C/w xalatan QHS OU, STOP timolol, ADD cosopt BID OS, brimonidine TID OS -- continue with current plan but given elevated IOP would add diamox 500mg BID as long as renal function OK   - Will re-check IOP tomorrow   - Will need close f/u as outpatient - need to ensure that short term rehab has ability to bring patient to apt by early next week. Needs to see a glaucoma specialist,, consider possible LPI in s/o narrow angles - patient likely has component of chronic angle closure glaucoma though limited view in s/o microcystic corneal edema OS    # Proptosis OU   - CT orbits performed prior with evidence bilateral proptosis, no retrobulbar mass appreciated   - Advise thyroid function testing (TSH, T4/T3, TSI)    Discussed with primary team and patient.     Dw Dr. Tigre Wynn, glaucoma specialist. SDW Dr. Chicas, attending.     Outpatient follow-up: Patient should follow-up with his/her ophthalmologist or with [facility] Department of Ophthalmology at the address below     [address]  [place] [address]  [phone]    Ella Martínez MD, MPH PGY-3  Pager: [phone]  Google Voice: [phone]   Also available on Microsoft Teams       [facility] DEPARTMENT OF OPHTHALMOLOGY  ------------------------------------------------------------------------------  Ella Martínez MD, MPH, PGY-3  Pager: [phone]  ------------------------------------------------------------------------------    Interval History: No acute events overnight. Today patient denying blurred vision, eye pain, flashes, floaters, FBS, erythema, or discharge.     MEDICATIONS  (STANDING):  acetaZOLAMIDE    Tablet 500 milliGRAM(s) Oral two times a day  artificial  tears Solution 1 Drop(s) Both EYES four times a day  aspirin  chewable 81 milliGRAM(s) Oral daily  atorvastatin 40 milliGRAM(s) Oral at bedtime  brimonidine 0.2% Ophthalmic Solution 1 Drop(s) Left EYE three times a day  budesonide 160 MICROgram(s)/formoterol 4.5 MICROgram(s) Inhaler 2 Puff(s) Inhalation two times a day  carvedilol 12.5 milliGRAM(s) Oral every 12 hours  diphenoxylate/atropine 1 Tablet(s) Oral two times a day  dorzolamide 2%/timolol 0.5% Ophthalmic Solution 1 Drop(s) Left EYE every 12 hours  enoxaparin Injectable 40 milliGRAM(s) SubCutaneous daily  fluconAZOLE IVPB 200 milliGRAM(s) IV Intermittent every 24 hours  latanoprost 0.005% Ophthalmic Solution 1 Drop(s) Both EYES at bedtime  losartan 100 milliGRAM(s) Oral daily  magnesium oxide 400 milliGRAM(s) Oral once  meclizine 12.5 milliGRAM(s) Oral two times a day  montelukast 10 milliGRAM(s) Oral at bedtime  multivitamin 1 Tablet(s) Oral daily    MEDICATIONS  (PRN):  acetaminophen     Tablet .. 650 milliGRAM(s) Oral every 6 hours PRN Mild Pain (1 - 3), Moderate Pain (4 - 6)  ALBUTerol    90 MICROgram(s) HFA Inhaler 2 Puff(s) Inhalation every 6 hours PRN Shortness of Breath and/or Wheezing  traMADol 25 milliGRAM(s) Oral every 6 hours PRN Moderate Pain (4 - 6)    VITALS: T(C): 37.2 (12-28-21 @ 16:00)  T(F): 99 (12-28-21 @ 16:00), Max: 99 (12-28-21 @ 16:00)  HR: 85 (12-28-21 @ 18:28) (73 - 85)  BP: 146/64 (12-28-21 @ 18:28) (122/68 - 146/64)  RR:  (17 - 17)  SpO2:  (96% - 99%)  Wt(kg): --  General: AAO x 3, appropriate mood and affect    Ophthalmology Exam:  Visual acuity (cc): 20/20 OD, 20/30 OS   Pupils: PERRL OU, no APD  Ttono: 20, 32   Extraocular movements (EOMs): Full OU, no pain, no diplopia  Confrontational Visual Field (CVF): Full OU  No resistance to retropulsion OU, Proptosis OS>OD    Slit Lamp Exam (SLE)  External: Proptosis OS>OD  Lids/Lashes/Lacrimal Ducts: ectropion OD>OS. Scant discharge medially with possible small focal collection medial inferior lid OD - appears to be improving.   Sclera/Conjunctiva: 1-2+ injection OS> OD with few cork-screw like vessels noted on superior conjunctiva OS                                                                                                                                                                                                                                                                                                                                                                                                                                                                                                                                                                                Cornea: 1+ SPK OU, microcystic corneal edema OS  Anterior Chamber: appears shallow OS>OD  Iris: Flat OU  Lens: NS OU    Gonioscopy 12/27/21: limited view OD due to poor pt stabilization at slit lamp but able to view TM at least 180 OD, bare  OS (poor view in s/o microcystic corneal edema)    Assessment and Plan:     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 Ophthalmology consulted for eye redness OU Patient reports improvement, but today with persistent redness, cork-screw vessels, and elevated IOP    # Eye redness both eyes  - Etiology previously presumed dryness/irritation- given dry eye appearance with ectropion and improvement with drops  - Per prior note, corkscrew vessels noted with concern for CCF -   - Given persistent findings, worsening IOP - Previous scan with notable proptosis - recommended CT-A or MRA-brain to rule-out CCF - imaging study negative   - cw ofloxacin gtts QID, Erythromycin ointment qHS 5-7 days-  - Cw Artificial tears 4 times a day Both eyes  - Cw Warm compress TID    # glaucoma suspect vs. ocular HTN -- on gonio patient with suspicion for possible chronic angle closure glaucoma OS  - follows with Dr. Anil Crystal, last HVF, IOP reportedly normal per pt    - Can also consider elevated episcleral venous pressure in s/p dilated vessels (possible causes of increased  include congestive heart failure, vasculitis, thyroid ophthalmopathy) though no blood appreciated in Schlemm's canal on gonio and bare TM which is inconsistent with elevated .   - C/w xalatan QHS OU, STOP timolol, ADD cosopt BID OS, brimonidine TID OS -- continue with current plan but given elevated IOP would add diamox 500mg BID as long as renal function OK   - Will re-check IOP tomorrow   - Will need close f/u as outpatient - need to ensure that short term rehab has ability to bring patient to apt by early next week. Needs to see a glaucoma specialist,, consider possible LPI in s/o narrow angles - patient likely has component of chronic angle closure glaucoma though limited view in s/o microcystic corneal edema OS    # Proptosis OU   - CT orbits performed prior with evidence bilateral proptosis, no retrobulbar mass appreciated   - Advise thyroid function testing (TSH, T4/T3, TSI)    Discussed with primary team and patient.     Trevin Wynn, glaucoma specialist. NOAH Chicas, attending.     Outpatient follow-up: Patient should follow-up with his/her ophthalmologist or with [facility] Department of Ophthalmology at the address below     [address]  [place] [address]  [phone]    Ella Martínez MD, MPH PGY-3  Pager: [phone]  Google Voice: [phone]   Also available on Microsoft Teams

## 2021-12-29 PROCEDURE — 99232 SBSQ HOSP IP/OBS MODERATE 35: CPT | Mod: GC

## 2021-12-29 PROCEDURE — 99232 SBSQ HOSP IP/OBS MODERATE 35: CPT

## 2021-12-29 RX ADMIN — Medication 650 MILLIGRAM(S): at 11:47

## 2021-12-29 RX ADMIN — Medication 650 MILLIGRAM(S): at 12:17

## 2021-12-29 RX ADMIN — BRIMONIDINE TARTRATE 1 DROP(S): 2 SOLUTION/ DROPS OPHTHALMIC at 07:04

## 2021-12-29 RX ADMIN — DORZOLAMIDE HYDROCHLORIDE TIMOLOL MALEATE 1 DROP(S): 20; 5 SOLUTION/ DROPS OPHTHALMIC at 07:04

## 2021-12-29 RX ADMIN — DORZOLAMIDE HYDROCHLORIDE TIMOLOL MALEATE 1 DROP(S): 20; 5 SOLUTION/ DROPS OPHTHALMIC at 18:27

## 2021-12-29 RX ADMIN — Medication 12.5 MILLIGRAM(S): at 06:50

## 2021-12-29 RX ADMIN — BRIMONIDINE TARTRATE 1 DROP(S): 2 SOLUTION/ DROPS OPHTHALMIC at 21:16

## 2021-12-29 RX ADMIN — Medication 1 TABLET(S): at 11:47

## 2021-12-29 RX ADMIN — Medication 12.5 MILLIGRAM(S): at 18:26

## 2021-12-29 RX ADMIN — Medication 1 DROP(S): at 11:38

## 2021-12-29 RX ADMIN — CARVEDILOL PHOSPHATE 12.5 MILLIGRAM(S): 80 CAPSULE, EXTENDED RELEASE ORAL at 06:50

## 2021-12-29 RX ADMIN — LATANOPROST 1 DROP(S): 0.05 SOLUTION/ DROPS OPHTHALMIC; TOPICAL at 21:15

## 2021-12-29 RX ADMIN — Medication 1 DROP(S): at 18:27

## 2021-12-29 RX ADMIN — ACETAZOLAMIDE 500 MILLIGRAM(S): 250 TABLET ORAL at 06:50

## 2021-12-29 RX ADMIN — BRIMONIDINE TARTRATE 1 DROP(S): 2 SOLUTION/ DROPS OPHTHALMIC at 14:05

## 2021-12-29 RX ADMIN — ACETAZOLAMIDE 500 MILLIGRAM(S): 250 TABLET ORAL at 18:26

## 2021-12-29 RX ADMIN — Medication 1 DROP(S): at 07:04

## 2021-12-29 RX ADMIN — LOSARTAN POTASSIUM 100 MILLIGRAM(S): 100 TABLET, FILM COATED ORAL at 06:50

## 2021-12-29 RX ADMIN — BUDESONIDE AND FORMOTEROL FUMARATE DIHYDRATE 2 PUFF(S): 160; 4.5 AEROSOL RESPIRATORY (INHALATION) at 21:15

## 2021-12-29 RX ADMIN — ATORVASTATIN CALCIUM 40 MILLIGRAM(S): 80 TABLET, FILM COATED ORAL at 21:14

## 2021-12-29 RX ADMIN — MONTELUKAST 10 MILLIGRAM(S): 4 TABLET, CHEWABLE ORAL at 21:14

## 2021-12-29 RX ADMIN — CARVEDILOL PHOSPHATE 12.5 MILLIGRAM(S): 80 CAPSULE, EXTENDED RELEASE ORAL at 18:26

## 2021-12-29 RX ADMIN — MAGNESIUM OXIDE 400 MG ORAL TABLET 400 MILLIGRAM(S): 241.3 TABLET ORAL at 11:46

## 2021-12-29 RX ADMIN — Medication 1 DROP(S): at 00:40

## 2021-12-29 RX ADMIN — ENOXAPARIN SODIUM 40 MILLIGRAM(S): 100 INJECTION SUBCUTANEOUS at 11:39

## 2021-12-29 RX ADMIN — Medication 1 TABLET(S): at 06:50

## 2021-12-29 RX ADMIN — Medication 81 MILLIGRAM(S): at 11:38

## 2021-12-29 RX ADMIN — Medication 1 TABLET(S): at 18:27

## 2021-12-29 RX ADMIN — FLUCONAZOLE 100 MILLIGRAM(S): 150 TABLET ORAL at 21:14

## 2021-12-29 NOTE — PROGRESS NOTE ADULT - PROBLEM SELECTOR PLAN 8
DVT ppx: Lovenox SQ  Diet: Tolerating regular diet  Dispo: Pending clinical improvement  Dispo: PT rec rehab

## 2021-12-29 NOTE — PROGRESS NOTE ADULT - PROBLEM SELECTOR PLAN 2
Less likely due to candidiasis but will treat empirically pending barium swallow. If negative will pursue EGD  -appreciate GI recs  -Barium swallow with evidence of nonperistaltic tertiary esophageal contractions  -Dysphagia diet, advance as tolerated  -Outpatient EGD +/- manometry

## 2021-12-29 NOTE — PROGRESS NOTE ADULT - ATTENDING COMMENTS
Pt seen/examined at bedside.    71 M, UC, a/w dizziness, b/l conjunctivitis, otitis externa.  Dysphagia, improved, tolerating regular diet.  EGD o/p.  COVID exposure, monitoring.  Optho input appreciated.  Started Diamox.  Will need close Optho follow-up.      PT-->AKUA, discharge planning

## 2021-12-29 NOTE — PROGRESS NOTE ADULT - SUBJECTIVE AND OBJECTIVE BOX
~~~~~~~~~~~~~~~~~~~~~~~~~~~~~~~~~~  Conrad Trejo, PGY2  Pager [phone] (NS) [phone] ([facility])  After 7: Night Float Pager  ~~~~~~~~~~~~~~~~~~~~~~~~~~~~~~~~~~    PROGRESS NOTE:     Patient is a 71y old  Male who presents with a chief complaint of dizziness, ear pain (28 Dec 2021 20:43)      SUBJECTIVE / OVERNIGHT EVENTS:  -No acute events overnight  -Persistently elevated IOP of left eye on ophtho exam yesterday -> acetazolamide started  -Patient without acute complaints today, tolerating regular diet      ADDITIONAL REVIEW OF SYSTEMS:    MEDICATIONS  (STANDING):  acetaZOLAMIDE    Tablet 500 milliGRAM(s) Oral two times a day  artificial  tears Solution 1 Drop(s) Both EYES four times a day  aspirin  chewable 81 milliGRAM(s) Oral daily  atorvastatin 40 milliGRAM(s) Oral at bedtime  brimonidine 0.2% Ophthalmic Solution 1 Drop(s) Left EYE three times a day  budesonide 160 MICROgram(s)/formoterol 4.5 MICROgram(s) Inhaler 2 Puff(s) Inhalation two times a day  carvedilol 12.5 milliGRAM(s) Oral every 12 hours  diphenoxylate/atropine 1 Tablet(s) Oral two times a day  dorzolamide 2%/timolol 0.5% Ophthalmic Solution 1 Drop(s) Left EYE every 12 hours  enoxaparin Injectable 40 milliGRAM(s) SubCutaneous daily  fluconAZOLE IVPB 200 milliGRAM(s) IV Intermittent every 24 hours  latanoprost 0.005% Ophthalmic Solution 1 Drop(s) Both EYES at bedtime  losartan 100 milliGRAM(s) Oral daily  magnesium oxide 400 milliGRAM(s) Oral once  meclizine 12.5 milliGRAM(s) Oral two times a day  montelukast 10 milliGRAM(s) Oral at bedtime  multivitamin 1 Tablet(s) Oral daily    MEDICATIONS  (PRN):  acetaminophen     Tablet .. 650 milliGRAM(s) Oral every 6 hours PRN Mild Pain (1 - 3), Moderate Pain (4 - 6)  ALBUTerol    90 MICROgram(s) HFA Inhaler 2 Puff(s) Inhalation every 6 hours PRN Shortness of Breath and/or Wheezing  traMADol 25 milliGRAM(s) Oral every 6 hours PRN Moderate Pain (4 - 6)      CAPILLARY BLOOD GLUCOSE      POCT Blood Glucose.: 184 mg/dL (28 Dec 2021 22:16)    I&O's Summary    28 Dec 2021 07:01  -  29 Dec 2021 07:00  --------------------------------------------------------  IN: 340 mL / OUT: 0 mL / NET: 340 mL        PHYSICAL EXAM:  Vital Signs Last 24 Hrs  T(C): 36.9 (29 Dec 2021 06:50), Max: 37.2 (28 Dec 2021 16:00)  T(F): 98.4 (29 Dec 2021 06:50), Max: 99 (28 Dec 2021 16:00)  HR: 79 (29 Dec 2021 06:50) (72 - 85)  BP: 117/63 (29 Dec 2021 06:50) (108/60 - 146/64)  BP(mean): --  RR: 17 (29 Dec 2021 06:50) (17 - 17)  SpO2: 99% (29 Dec 2021 06:50) (98% - 99%)    CONSTITUTIONAL: NAD, well-developed  RESPIRATORY: Normal respiratory effort; lungs are clear to auscultation bilaterally  CARDIOVASCULAR: Regular rate and rhythm, normal S1 and S2, no murmur/rub/gallop; No lower extremity edema; Peripheral pulses are 2+ bilaterally  ABDOMEN: Nontender to palpation, normoactive bowel sounds, no rebound/guarding; No hepatosplenomegaly  MUSCLOSKELETAL: no clubbing or cyanosis of digits; no joint swelling or tenderness to palpation  PSYCH: A+O to person, place, and time; affect appropriate    LABS:                      RADIOLOGY & ADDITIONAL TESTS:  Results Reviewed:   Imaging Personally Reviewed:  Electrocardiogram Personally Reviewed:    COORDINATION OF CARE:  Care Discussed with Consultants/Other Providers [Y/N]:  Prior or Outpatient Records Reviewed [Y/N]:   ~~~~~~~~~~~~~~~~~~~~~~~~~~~~~~~~~~  Conrad Trejo, PGY2  Pager [phone] (NS) [phone] ([facility])  After 7: Night Float Pager  ~~~~~~~~~~~~~~~~~~~~~~~~~~~~~~~~~~    PROGRESS NOTE:     Patient is a 71y old  Male who presents with a chief complaint of dizziness, ear pain (28 Dec 2021 20:43)      SUBJECTIVE / OVERNIGHT EVENTS:  -No acute events overnight  -Persistently elevated IOP of left eye on ophtho exam yesterday -> acetazolamide started  -Patient without acute complaints today, tolerating regular diet      ADDITIONAL REVIEW OF SYSTEMS:    MEDICATIONS  (STANDING):  acetaZOLAMIDE    Tablet 500 milliGRAM(s) Oral two times a day  artificial  tears Solution 1 Drop(s) Both EYES four times a day  aspirin  chewable 81 milliGRAM(s) Oral daily  atorvastatin 40 milliGRAM(s) Oral at bedtime  brimonidine 0.2% Ophthalmic Solution 1 Drop(s) Left EYE three times a day  budesonide 160 MICROgram(s)/formoterol 4.5 MICROgram(s) Inhaler 2 Puff(s) Inhalation two times a day  carvedilol 12.5 milliGRAM(s) Oral every 12 hours  diphenoxylate/atropine 1 Tablet(s) Oral two times a day  dorzolamide 2%/timolol 0.5% Ophthalmic Solution 1 Drop(s) Left EYE every 12 hours  enoxaparin Injectable 40 milliGRAM(s) SubCutaneous daily  fluconAZOLE IVPB 200 milliGRAM(s) IV Intermittent every 24 hours  latanoprost 0.005% Ophthalmic Solution 1 Drop(s) Both EYES at bedtime  losartan 100 milliGRAM(s) Oral daily  magnesium oxide 400 milliGRAM(s) Oral once  meclizine 12.5 milliGRAM(s) Oral two times a day  montelukast 10 milliGRAM(s) Oral at bedtime  multivitamin 1 Tablet(s) Oral daily    MEDICATIONS  (PRN):  acetaminophen     Tablet .. 650 milliGRAM(s) Oral every 6 hours PRN Mild Pain (1 - 3), Moderate Pain (4 - 6)  ALBUTerol    90 MICROgram(s) HFA Inhaler 2 Puff(s) Inhalation every 6 hours PRN Shortness of Breath and/or Wheezing  traMADol 25 milliGRAM(s) Oral every 6 hours PRN Moderate Pain (4 - 6)      CAPILLARY BLOOD GLUCOSE      POCT Blood Glucose.: 184 mg/dL (28 Dec 2021 22:16)    I&O's Summary    28 Dec 2021 07:01  -  29 Dec 2021 07:00  --------------------------------------------------------  IN: 340 mL / OUT: 0 mL / NET: 340 mL        PHYSICAL EXAM:  Vital Signs Last 24 Hrs  T(C): 36.9 (29 Dec 2021 06:50), Max: 37.2 (28 Dec 2021 16:00)  T(F): 98.4 (29 Dec 2021 06:50), Max: 99 (28 Dec 2021 16:00)  HR: 79 (29 Dec 2021 06:50) (72 - 85)  BP: 117/63 (29 Dec 2021 06:50) (108/60 - 146/64)  BP(mean): --  RR: 17 (29 Dec 2021 06:50) (17 - 17)  SpO2: 99% (29 Dec 2021 06:50) (98% - 99%)    CONSTITUTIONAL: NAD, well-developed  RESPIRATORY: Normal respiratory effort; lungs are clear to auscultation bilaterally  CARDIOVASCULAR: Regular rate and rhythm, normal S1 and S2, no murmur/rub/gallop; No lower extremity edema; Peripheral pulses are 2+ bilaterally  ABDOMEN: Nontender to palpation, normoactive bowel sounds, no rebound/guarding; No hepatosplenomegaly  MUSCLOSKELETAL: no clubbing or cyanosis of digits; no joint swelling or tenderness to palpation  PSYCH: A+O to person, place, and time; affect appropriate    LABS:                      RADIOLOGY & ADDITIONAL TESTS:  Results Reviewed:   Imaging Personally Reviewed:  Electrocardiogram Personally Reviewed:    COORDINATION OF CARE:  Care Discussed with Consultants/Other Providers [Y/N]: FRANCK Martínez (optho), will need close follow-up  Prior or Outpatient Records Reviewed [Y/N]:

## 2021-12-29 NOTE — PROGRESS NOTE ADULT - SUBJECTIVE AND OBJECTIVE BOX
[facility] DEPARTMENT OF OPHTHALMOLOGY  ------------------------------------------------------------------------------  Ella Martínez MD, MPH, PGY-3  Pager: [phone]  ------------------------------------------------------------------------------    Interval History: No acute events overnight. Pt without change in vision, with mild eye pain OS that is unchanged from prior. Called Dr. Crystal's office (spoke with colleague Dr. Romo(?) as Dr. Crystal's was not in the office). Patient was last seen in November of this year and was treated for primary open angle glaucoma with xal QHS OU. Gonio with grade 3 angle OU and IOP 14 OU.     MEDICATIONS  (STANDING):  acetaZOLAMIDE    Tablet 500 milliGRAM(s) Oral two times a day  artificial  tears Solution 1 Drop(s) Both EYES four times a day  aspirin  chewable 81 milliGRAM(s) Oral daily  atorvastatin 40 milliGRAM(s) Oral at bedtime  brimonidine 0.2% Ophthalmic Solution 1 Drop(s) Left EYE three times a day  budesonide 160 MICROgram(s)/formoterol 4.5 MICROgram(s) Inhaler 2 Puff(s) Inhalation two times a day  carvedilol 12.5 milliGRAM(s) Oral every 12 hours  diphenoxylate/atropine 1 Tablet(s) Oral two times a day  dorzolamide 2%/timolol 0.5% Ophthalmic Solution 1 Drop(s) Left EYE every 12 hours  enoxaparin Injectable 40 milliGRAM(s) SubCutaneous daily  fluconAZOLE IVPB 200 milliGRAM(s) IV Intermittent every 24 hours  latanoprost 0.005% Ophthalmic Solution 1 Drop(s) Both EYES at bedtime  losartan 100 milliGRAM(s) Oral daily  meclizine 12.5 milliGRAM(s) Oral two times a day  montelukast 10 milliGRAM(s) Oral at bedtime  multivitamin 1 Tablet(s) Oral daily    MEDICATIONS  (PRN):  acetaminophen     Tablet .. 650 milliGRAM(s) Oral every 6 hours PRN Mild Pain (1 - 3), Moderate Pain (4 - 6)  ALBUTerol    90 MICROgram(s) HFA Inhaler 2 Puff(s) Inhalation every 6 hours PRN Shortness of Breath and/or Wheezing  traMADol 25 milliGRAM(s) Oral every 6 hours PRN Moderate Pain (4 - 6)    VITALS: T(C): 37 (12-29-21 @ 18:21)  T(F): 98.6 (12-29-21 @ 18:21), Max: 98.6 (12-29-21 @ 18:21)  HR: 79 (12-29-21 @ 18:21) (72 - 79)  BP: 115/68 (12-29-21 @ 18:21) (108/60 - 117/63)  RR:  (17 - 19)  SpO2:  (99% - 99%)  Wt(kg): --  General: AAO x 3, appropriate mood and affect    Ophthalmology Exam:  Visual acuity (cc): 20/20 OD, 20/30 OS    Pupils: PERRL OU, no APD  Ttono: 19,31  Extraocular movements (EOMs): Full OU, no pain, no diplopia  Confrontational Visual Field (CVF): Full OU  No resistance to retropulsion OU, Proptosis OS>OD    Slit Lamp Exam (SLE)  External: Proptosis OS>OD  Lids/Lashes/Lacrimal Ducts: ectropion OD>OS. Scant discharge medially with possible small focal collection medial inferior lid OD - appears to be improving.   Sclera/Conjunctiva: 1-2+ injection OS> OD with few cork-screw like vessels noted on superior conjunctiva OS                                                                                                                                                                                                                                                                                                                                                                                                                                                                                                                                                                                Cornea: 1+ SPK OU, microcystic corneal edema OS  Anterior Chamber: appears shallow OS>OD  Iris: Flat OU  Lens: NS OU    Gonioscopy 12/28/21: bare TM at least 180 OU, poor view OS in s/o microcystic corneal edema     Assessment and Plan:     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 Ophthalmology consulted for eye redness OU Patient reports improvement, but today with persistent redness, cork-screw vessels, and elevated IOP    # Eye redness both eyes  - Etiology previously presumed dryness/irritation- given dry eye appearance with ectropion and improvement with drops  - Per prior note, corkscrew vessels noted with concern for CCF -   - Given persistent findings, worsening IOP - Previous scan with notable proptosis - recommended CT-A or MRA-brain to rule-out CCF - imaging study negative   - cw ofloxacin gtts QID, Erythromycin ointment QHS 5-7 days   - Cw Artificial tears 4 times a day Both eyes  - Cw Warm compress TID    # glaucoma suspect vs. ocular HTN -- on gonio patient with suspicion for possible chronic angle closure glaucoma OS  - follows with Dr. Anil Crystal for POAG - last IOP in November 14 OU  - Can also consider elevated episcleral venous pressure in s/p dilated vessels (possible causes of increased  include congestive heart failure, vasculitis, thyroid ophthalmopathy) though no blood appreciated in Schlemm's canal on gonio and bare TM which is inconsistent with elevated .   - Can also consider HSV trabeculitis given suspected acute outflow obstruction though there is no cell/flare evident on exam and no signs of HSV (no KPs, no synechiae)   - No signs NVI to suggest vaso-occlusive event  - C/w xalatan QHS OU, STOP timolol, ADD cosopt BID OS, brimonidine TID OS, c/w diamox 500mg BID  - Possible that patient has intermittent angle closure given findings on gonioscopy - would continue with maximum medical therapy now and plan for additional testing and possible LPI to help with outflow as outpatient on Monday. Unclear etiology for acute rise in IOP with narrow angles on exam when previously documented grade 3 OU. Discussed importance of outpatient follow-up for further work-up with patient and primary team  - Pt to be discharged to short-term rehab, spoke with primary team to please ensure patient has arrangement for transportation to [place] office to see Dr. Tigre Wynn, glaucoma specialist -- patient is to please arrive at address below at 12:30 p.m Monday January 3rd     # Proptosis OU   - CT orbits performed prior with evidence bilateral proptosis, no retrobulbar mass appreciated   - Advise thyroid function testing (TSH, T4/T3, TSI)    Discussed with primary team and patient.     Case SDW Dr. Tigre Wynn, glaucoma specialist.    Outpatient follow-up: Patient should follow-up with Dr. Tigre Wynn, glaucoma attending, at 12:30 p.m on Monday January 3rd at the following address:    [place] [id number]  [phone]    Ella Martínez MD, MPH PGY-3  Pager: [phone]  [employer] Voice: [phone]   Also available on [employer] Teams

## 2021-12-29 NOTE — PROGRESS NOTE ADULT - PROBLEM SELECTOR PLAN 1
- pt with bilateral conjunctivitis   - no purulent eye discharge   - CT showing orbital proptosis - TSH wnl  - will continue latanoprost eye drops  - R/o CCF with CTA, performed 12/26, no evidence of CCF  - brimonidine and timlolol/dorzalamide added for increase IOP of left eye  - Acetazolamide started 12/28  - WIll need outpatient glaucoma specialist f/u

## 2021-12-30 LAB
ANION GAP SERPL CALC-SCNC: 11 MMOL/L — SIGNIFICANT CHANGE UP (ref 7–14)
BUN SERPL-MCNC: 12 MG/DL — SIGNIFICANT CHANGE UP (ref 7–23)
CALCIUM SERPL-MCNC: 8.9 MG/DL — SIGNIFICANT CHANGE UP (ref 8.4–10.5)
CHLORIDE SERPL-SCNC: 103 MMOL/L — SIGNIFICANT CHANGE UP (ref 98–107)
CO2 SERPL-SCNC: 20 MMOL/L — LOW (ref 22–31)
CREAT SERPL-MCNC: 1.1 MG/DL — SIGNIFICANT CHANGE UP (ref 0.5–1.3)
GLUCOSE SERPL-MCNC: 102 MG/DL — HIGH (ref 70–99)
HCT VFR BLD CALC: 39.9 % — SIGNIFICANT CHANGE UP (ref 39–50)
HGB BLD-MCNC: 10.9 G/DL — LOW (ref 13–17)
MAGNESIUM SERPL-MCNC: 2.2 MG/DL — SIGNIFICANT CHANGE UP (ref 1.6–2.6)
MCHC RBC-ENTMCNC: 19.9 PG — LOW (ref 27–34)
MCHC RBC-ENTMCNC: 27.3 GM/DL — LOW (ref 32–36)
MCV RBC AUTO: 72.8 FL — LOW (ref 80–100)
NRBC # BLD: 0 /100 WBCS — SIGNIFICANT CHANGE UP
NRBC # FLD: 0 K/UL — SIGNIFICANT CHANGE UP
PHOSPHATE SERPL-MCNC: 3 MG/DL — SIGNIFICANT CHANGE UP (ref 2.5–4.5)
PLATELET # BLD AUTO: 491 K/UL — HIGH (ref 150–400)
POTASSIUM SERPL-MCNC: 4.8 MMOL/L — SIGNIFICANT CHANGE UP (ref 3.5–5.3)
POTASSIUM SERPL-SCNC: 4.8 MMOL/L — SIGNIFICANT CHANGE UP (ref 3.5–5.3)
RBC # BLD: 5.48 M/UL — SIGNIFICANT CHANGE UP (ref 4.2–5.8)
RBC # FLD: 21.2 % — HIGH (ref 10.3–14.5)
SARS-COV-2 RNA SPEC QL NAA+PROBE: SIGNIFICANT CHANGE UP
SODIUM SERPL-SCNC: 134 MMOL/L — LOW (ref 135–145)
WBC # BLD: 6.05 K/UL — SIGNIFICANT CHANGE UP (ref 3.8–10.5)
WBC # FLD AUTO: 6.05 K/UL — SIGNIFICANT CHANGE UP (ref 3.8–10.5)

## 2021-12-30 PROCEDURE — 99232 SBSQ HOSP IP/OBS MODERATE 35: CPT | Mod: GC

## 2021-12-30 RX ORDER — DIPHENOXYLATE HCL/ATROPINE 2.5-.025MG
1 TABLET ORAL
Refills: 0 | Status: DISCONTINUED | OUTPATIENT
Start: 2021-12-30 | End: 2021-12-31

## 2021-12-30 RX ADMIN — BUDESONIDE AND FORMOTEROL FUMARATE DIHYDRATE 2 PUFF(S): 160; 4.5 AEROSOL RESPIRATORY (INHALATION) at 09:41

## 2021-12-30 RX ADMIN — ATORVASTATIN CALCIUM 40 MILLIGRAM(S): 80 TABLET, FILM COATED ORAL at 21:52

## 2021-12-30 RX ADMIN — Medication 81 MILLIGRAM(S): at 13:23

## 2021-12-30 RX ADMIN — Medication 1 TABLET(S): at 18:35

## 2021-12-30 RX ADMIN — DORZOLAMIDE HYDROCHLORIDE TIMOLOL MALEATE 1 DROP(S): 20; 5 SOLUTION/ DROPS OPHTHALMIC at 18:29

## 2021-12-30 RX ADMIN — FLUCONAZOLE 100 MILLIGRAM(S): 150 TABLET ORAL at 21:51

## 2021-12-30 RX ADMIN — BRIMONIDINE TARTRATE 1 DROP(S): 2 SOLUTION/ DROPS OPHTHALMIC at 13:55

## 2021-12-30 RX ADMIN — BRIMONIDINE TARTRATE 1 DROP(S): 2 SOLUTION/ DROPS OPHTHALMIC at 05:24

## 2021-12-30 RX ADMIN — MONTELUKAST 10 MILLIGRAM(S): 4 TABLET, CHEWABLE ORAL at 21:51

## 2021-12-30 RX ADMIN — BUDESONIDE AND FORMOTEROL FUMARATE DIHYDRATE 2 PUFF(S): 160; 4.5 AEROSOL RESPIRATORY (INHALATION) at 21:52

## 2021-12-30 RX ADMIN — Medication 1 TABLET(S): at 05:23

## 2021-12-30 RX ADMIN — Medication 1 DROP(S): at 13:22

## 2021-12-30 RX ADMIN — Medication 1 DROP(S): at 18:27

## 2021-12-30 RX ADMIN — Medication 1 TABLET(S): at 13:55

## 2021-12-30 RX ADMIN — LATANOPROST 1 DROP(S): 0.05 SOLUTION/ DROPS OPHTHALMIC; TOPICAL at 21:53

## 2021-12-30 RX ADMIN — ACETAZOLAMIDE 500 MILLIGRAM(S): 250 TABLET ORAL at 18:28

## 2021-12-30 RX ADMIN — ENOXAPARIN SODIUM 40 MILLIGRAM(S): 100 INJECTION SUBCUTANEOUS at 13:22

## 2021-12-30 RX ADMIN — Medication 12.5 MILLIGRAM(S): at 05:23

## 2021-12-30 RX ADMIN — BRIMONIDINE TARTRATE 1 DROP(S): 2 SOLUTION/ DROPS OPHTHALMIC at 21:52

## 2021-12-30 RX ADMIN — Medication 1 DROP(S): at 05:23

## 2021-12-30 RX ADMIN — DORZOLAMIDE HYDROCHLORIDE TIMOLOL MALEATE 1 DROP(S): 20; 5 SOLUTION/ DROPS OPHTHALMIC at 05:24

## 2021-12-30 RX ADMIN — ACETAZOLAMIDE 500 MILLIGRAM(S): 250 TABLET ORAL at 05:23

## 2021-12-30 RX ADMIN — Medication 12.5 MILLIGRAM(S): at 18:29

## 2021-12-30 NOTE — PROGRESS NOTE ADULT - PROBLEM SELECTOR PLAN 1
- pt with bilateral conjunctivitis   - no purulent eye discharge   - CT showing orbital proptosis - TSH wnl  - will continue latanoprost eye drops  - R/o CCF with CTA, performed 12/26, no evidence of CCF  - brimonidine and timlolol/dorzalamide added for increase IOP of left eye  - Acetazolamide started 12/28  - WIll need outpatient glaucoma specialist f/u, planned for 1/3 at 1230pm with Dr. Tigre Wynn

## 2021-12-30 NOTE — CHART NOTE - NSCHARTNOTEFT_GEN_A_CORE
Patient had arrived to his room on 9th floor before neurology consult note was submitted that requested tele monitoring. Bedboard notified. Patient to be moved to a tele unit for r/o stroke evaluation.
Patient last seen by RD on 12/23, now for malnutrition follow up. Spoke with pt and obtained subjective information from extensive chart review.     Current Diet : Diet, DASH/TLC:   Sodium & Cholesterol Restricted (12-28-21 @ 09:59)      PO intake:  50-75%   Current Weight: unavailable    Nutrition Interval Events: Pt continues to eat fairly - still does not wish to have ONS. Request obtaining new weight to assess trend and determine adequacy of PO intake. No GI distress; last BM 12/29. No edema nor pressure injuries indicated at this time. Continue with nutrition plan of care. RDN services to remain available as needed.     __________________ Pertinent Medications__________________   MEDICATIONS  (STANDING):  acetaZOLAMIDE    Tablet 500 milliGRAM(s) Oral two times a day  artificial  tears Solution 1 Drop(s) Both EYES four times a day  aspirin  chewable 81 milliGRAM(s) Oral daily  atorvastatin 40 milliGRAM(s) Oral at bedtime  brimonidine 0.2% Ophthalmic Solution 1 Drop(s) Left EYE three times a day  budesonide 160 MICROgram(s)/formoterol 4.5 MICROgram(s) Inhaler 2 Puff(s) Inhalation two times a day  carvedilol 12.5 milliGRAM(s) Oral every 12 hours  diphenoxylate/atropine 1 Tablet(s) Oral two times a day  dorzolamide 2%/timolol 0.5% Ophthalmic Solution 1 Drop(s) Left EYE every 12 hours  enoxaparin Injectable 40 milliGRAM(s) SubCutaneous daily  fluconAZOLE IVPB 200 milliGRAM(s) IV Intermittent every 24 hours  latanoprost 0.005% Ophthalmic Solution 1 Drop(s) Both EYES at bedtime  losartan 100 milliGRAM(s) Oral daily  meclizine 12.5 milliGRAM(s) Oral two times a day  montelukast 10 milliGRAM(s) Oral at bedtime  multivitamin 1 Tablet(s) Oral daily    MEDICATIONS  (PRN):  acetaminophen     Tablet .. 650 milliGRAM(s) Oral every 6 hours PRN Mild Pain (1 - 3), Moderate Pain (4 - 6)  ALBUTerol    90 MICROgram(s) HFA Inhaler 2 Puff(s) Inhalation every 6 hours PRN Shortness of Breath and/or Wheezing  traMADol 25 milliGRAM(s) Oral every 6 hours PRN Moderate Pain (4 - 6)      __________________ Pertinent Labs__________________   12-30 Na134 mmol/L<L> Glu 102 mg/dL<H> K+ 4.8 mmol/L Cr  1.10 mg/dL BUN 12 mg/dL 12-30 Phos 3.0 mg/dL 12-27 Alb 2.5 g/dL<L> 12-18 Chol 148 mg/dL LDL --    HDL 37 mg/dL<L> Trig 56 mg/dL      Estimated Needs:   [x] no change since previous assessment        Previous Nutrition Diagnosis:     Moderate Malnutrition     Nutrition Diagnosis is [x] ongoing        Goal(s):  1. Patient to meet > 75% estimated energy needs    Recommendations:   1. Continue Nutrition Plan of Care as ordered.    Monitoring and Evaluation:   1. Monitor weights, labs, BMs, skin integrity, PO intake and edema.  2. RD services to remain available. Request obtaining new weight to assess trend and determine adequacy of PO intake.

## 2021-12-30 NOTE — PROGRESS NOTE ADULT - ATTENDING COMMENTS
Pt seen/examined at bedside.    71 M, UC, a/w dizziness, b/l conjunctivitis, otitis externa.  Dysphagia, improved, tolerating regular diet.  EGD o/p.  COVID exposure, monitoring.  Optho input appreciated.  Started Diamox.  Will need close Optho follow-up, appt for Monday 1/3, family to transport from [facility].  PT-->[facility], discharge planning, likely today.  DC time 40 minutes

## 2021-12-30 NOTE — PROGRESS NOTE ADULT - SUBJECTIVE AND OBJECTIVE BOX
~~~~~~~~~~~~~~~~~~~~~~~~~~~~~~~~~~  Conrad Trejo, PGY2  Pager [phone] (NS) [phone] ([facility])  After 7: Night Float Pager  ~~~~~~~~~~~~~~~~~~~~~~~~~~~~~~~~~~    PROGRESS NOTE:     Patient is a 71y old  Male who presents with a chief complaint of dizziness, ear pain (29 Dec 2021 20:37)      SUBJECTIVE / OVERNIGHT EVENTS:  -No acute events ovenright  -Improving conjuctivitis    ADDITIONAL REVIEW OF SYSTEMS:    MEDICATIONS  (STANDING):  acetaZOLAMIDE    Tablet 500 milliGRAM(s) Oral two times a day  artificial  tears Solution 1 Drop(s) Both EYES four times a day  aspirin  chewable 81 milliGRAM(s) Oral daily  atorvastatin 40 milliGRAM(s) Oral at bedtime  brimonidine 0.2% Ophthalmic Solution 1 Drop(s) Left EYE three times a day  budesonide 160 MICROgram(s)/formoterol 4.5 MICROgram(s) Inhaler 2 Puff(s) Inhalation two times a day  carvedilol 12.5 milliGRAM(s) Oral every 12 hours  diphenoxylate/atropine 1 Tablet(s) Oral two times a day  dorzolamide 2%/timolol 0.5% Ophthalmic Solution 1 Drop(s) Left EYE every 12 hours  enoxaparin Injectable 40 milliGRAM(s) SubCutaneous daily  fluconAZOLE IVPB 200 milliGRAM(s) IV Intermittent every 24 hours  latanoprost 0.005% Ophthalmic Solution 1 Drop(s) Both EYES at bedtime  losartan 100 milliGRAM(s) Oral daily  meclizine 12.5 milliGRAM(s) Oral two times a day  montelukast 10 milliGRAM(s) Oral at bedtime  multivitamin 1 Tablet(s) Oral daily    MEDICATIONS  (PRN):  acetaminophen     Tablet .. 650 milliGRAM(s) Oral every 6 hours PRN Mild Pain (1 - 3), Moderate Pain (4 - 6)  ALBUTerol    90 MICROgram(s) HFA Inhaler 2 Puff(s) Inhalation every 6 hours PRN Shortness of Breath and/or Wheezing  traMADol 25 milliGRAM(s) Oral every 6 hours PRN Moderate Pain (4 - 6)      CAPILLARY BLOOD GLUCOSE        I&O's Summary      PHYSICAL EXAM:  Vital Signs Last 24 Hrs  T(C): 37.2 (30 Dec 2021 05:18), Max: 37.2 (30 Dec 2021 05:18)  T(F): 99 (30 Dec 2021 05:18), Max: 99 (30 Dec 2021 05:18)  HR: 94 (30 Dec 2021 05:18) (67 - 94)  BP: 104/61 (30 Dec 2021 05:18) (102/48 - 115/68)  BP(mean): --  RR: 17 (30 Dec 2021 05:18) (17 - 19)  SpO2: 98% (30 Dec 2021 05:18) (98% - 100%)    CONSTITUTIONAL: NAD, well-developed  RESPIRATORY: Normal respiratory effort; lungs are clear to auscultation bilaterally  CARDIOVASCULAR: Regular rate and rhythm, normal S1 and S2, no murmur/rub/gallop; No lower extremity edema; Peripheral pulses are 2+ bilaterally  ABDOMEN: Nontender to palpation, normoactive bowel sounds, no rebound/guarding; No hepatosplenomegaly  MUSCLOSKELETAL: no clubbing or cyanosis of digits; no joint swelling or tenderness to palpation  PSYCH: A+O to person, place, and time; affect appropriate    LABS:                      RADIOLOGY & ADDITIONAL TESTS:  Results Reviewed:   Imaging Personally Reviewed:  Electrocardiogram Personally Reviewed:    COORDINATION OF CARE:  Care Discussed with Consultants/Other Providers [Y/N]:  Prior or Outpatient Records Reviewed [Y/N]:   ~~~~~~~~~~~~~~~~~~~~~~~~~~~~~~~~~~  Conrad Trejo, PGY2  Pager [phone] (NS) [phone] ([facility])  After 7: Night Float Pager  ~~~~~~~~~~~~~~~~~~~~~~~~~~~~~~~~~~    PROGRESS NOTE:     Patient is a 71y old  Male who presents with a chief complaint of dizziness, ear pain (29 Dec 2021 20:37)      SUBJECTIVE / OVERNIGHT EVENTS:  -No acute events ovenright  -Persistent elevated IOP of left eye  -Reports family member can help coordinate ophtho appt on Monday 1/3 at 1230pm      ADDITIONAL REVIEW OF SYSTEMS:    MEDICATIONS  (STANDING):  acetaZOLAMIDE    Tablet 500 milliGRAM(s) Oral two times a day  artificial  tears Solution 1 Drop(s) Both EYES four times a day  aspirin  chewable 81 milliGRAM(s) Oral daily  atorvastatin 40 milliGRAM(s) Oral at bedtime  brimonidine 0.2% Ophthalmic Solution 1 Drop(s) Left EYE three times a day  budesonide 160 MICROgram(s)/formoterol 4.5 MICROgram(s) Inhaler 2 Puff(s) Inhalation two times a day  carvedilol 12.5 milliGRAM(s) Oral every 12 hours  diphenoxylate/atropine 1 Tablet(s) Oral two times a day  dorzolamide 2%/timolol 0.5% Ophthalmic Solution 1 Drop(s) Left EYE every 12 hours  enoxaparin Injectable 40 milliGRAM(s) SubCutaneous daily  fluconAZOLE IVPB 200 milliGRAM(s) IV Intermittent every 24 hours  latanoprost 0.005% Ophthalmic Solution 1 Drop(s) Both EYES at bedtime  losartan 100 milliGRAM(s) Oral daily  meclizine 12.5 milliGRAM(s) Oral two times a day  montelukast 10 milliGRAM(s) Oral at bedtime  multivitamin 1 Tablet(s) Oral daily    MEDICATIONS  (PRN):  acetaminophen     Tablet .. 650 milliGRAM(s) Oral every 6 hours PRN Mild Pain (1 - 3), Moderate Pain (4 - 6)  ALBUTerol    90 MICROgram(s) HFA Inhaler 2 Puff(s) Inhalation every 6 hours PRN Shortness of Breath and/or Wheezing  traMADol 25 milliGRAM(s) Oral every 6 hours PRN Moderate Pain (4 - 6)      CAPILLARY BLOOD GLUCOSE        I&O's Summary      PHYSICAL EXAM:  Vital Signs Last 24 Hrs  T(C): 37.2 (30 Dec 2021 05:18), Max: 37.2 (30 Dec 2021 05:18)  T(F): 99 (30 Dec 2021 05:18), Max: 99 (30 Dec 2021 05:18)  HR: 94 (30 Dec 2021 05:18) (67 - 94)  BP: 104/61 (30 Dec 2021 05:18) (102/48 - 115/68)  BP(mean): --  RR: 17 (30 Dec 2021 05:18) (17 - 19)  SpO2: 98% (30 Dec 2021 05:18) (98% - 100%)    CONSTITUTIONAL: NAD, well-developed  RESPIRATORY: Normal respiratory effort; lungs are clear to auscultation bilaterally  CARDIOVASCULAR: Regular rate and rhythm, normal S1 and S2, no murmur/rub/gallop; No lower extremity edema; Peripheral pulses are 2+ bilaterally  ABDOMEN: Nontender to palpation, normoactive bowel sounds, no rebound/guarding; No hepatosplenomegaly  MUSCLOSKELETAL: no clubbing or cyanosis of digits; no joint swelling or tenderness to palpation  PSYCH: A+O to person, place, and time; affect appropriate    LABS:                      RADIOLOGY & ADDITIONAL TESTS:  Results Reviewed:   Imaging Personally Reviewed:  Electrocardiogram Personally Reviewed:    COORDINATION OF CARE:  Care Discussed with Consultants/Other Providers [Y/N]:  Prior or Outpatient Records Reviewed [Y/N]:

## 2021-12-31 ENCOUNTER — TRANSCRIPTION ENCOUNTER (OUTPATIENT)
Age: 71
End: 2021-12-31

## 2021-12-31 VITALS
TEMPERATURE: 99 F | RESPIRATION RATE: 18 BRPM | SYSTOLIC BLOOD PRESSURE: 122 MMHG | DIASTOLIC BLOOD PRESSURE: 72 MMHG | OXYGEN SATURATION: 100 % | HEART RATE: 100 BPM

## 2021-12-31 PROCEDURE — 99239 HOSP IP/OBS DSCHRG MGMT >30: CPT

## 2021-12-31 RX ORDER — MECLIZINE HCL 12.5 MG
1 TABLET ORAL
Qty: 0 | Refills: 0 | DISCHARGE
Start: 2021-12-31

## 2021-12-31 RX ORDER — ACETAMINOPHEN 500 MG
2 TABLET ORAL
Qty: 0 | Refills: 0 | DISCHARGE
Start: 2021-12-31

## 2021-12-31 RX ORDER — ACETAZOLAMIDE 250 MG/1
2 TABLET ORAL
Qty: 0 | Refills: 0 | DISCHARGE
Start: 2021-12-31

## 2021-12-31 RX ORDER — BRIMONIDINE TARTRATE 2 MG/MG
1 SOLUTION/ DROPS OPHTHALMIC
Qty: 0 | Refills: 0 | DISCHARGE
Start: 2021-12-31

## 2021-12-31 RX ORDER — DORZOLAMIDE HYDROCHLORIDE TIMOLOL MALEATE 20; 5 MG/ML; MG/ML
1 SOLUTION/ DROPS OPHTHALMIC
Qty: 0 | Refills: 0 | DISCHARGE
Start: 2021-12-31

## 2021-12-31 RX ORDER — FLUCONAZOLE 150 MG/1
1 TABLET ORAL
Qty: 0 | Refills: 0 | DISCHARGE

## 2021-12-31 RX ADMIN — BRIMONIDINE TARTRATE 1 DROP(S): 2 SOLUTION/ DROPS OPHTHALMIC at 14:48

## 2021-12-31 RX ADMIN — Medication 1 TABLET(S): at 12:15

## 2021-12-31 RX ADMIN — Medication 650 MILLIGRAM(S): at 15:23

## 2021-12-31 RX ADMIN — Medication 1 TABLET(S): at 05:48

## 2021-12-31 RX ADMIN — BRIMONIDINE TARTRATE 1 DROP(S): 2 SOLUTION/ DROPS OPHTHALMIC at 05:42

## 2021-12-31 RX ADMIN — Medication 1 DROP(S): at 00:41

## 2021-12-31 RX ADMIN — ACETAZOLAMIDE 500 MILLIGRAM(S): 250 TABLET ORAL at 05:43

## 2021-12-31 RX ADMIN — BUDESONIDE AND FORMOTEROL FUMARATE DIHYDRATE 2 PUFF(S): 160; 4.5 AEROSOL RESPIRATORY (INHALATION) at 09:37

## 2021-12-31 RX ADMIN — Medication 81 MILLIGRAM(S): at 12:14

## 2021-12-31 RX ADMIN — Medication 12.5 MILLIGRAM(S): at 05:43

## 2021-12-31 RX ADMIN — ENOXAPARIN SODIUM 40 MILLIGRAM(S): 100 INJECTION SUBCUTANEOUS at 12:14

## 2021-12-31 RX ADMIN — Medication 1 DROP(S): at 05:41

## 2021-12-31 RX ADMIN — Medication 1 DROP(S): at 12:14

## 2021-12-31 RX ADMIN — DORZOLAMIDE HYDROCHLORIDE TIMOLOL MALEATE 1 DROP(S): 20; 5 SOLUTION/ DROPS OPHTHALMIC at 05:42

## 2021-12-31 RX ADMIN — Medication 650 MILLIGRAM(S): at 14:47

## 2021-12-31 NOTE — PROGRESS NOTE ADULT - ATTENDING SUPERVISION STATEMENT
Fellow
Fellow
Resident
Fellow
Resident

## 2021-12-31 NOTE — PROVIDER CONTACT NOTE (OTHER) - ASSESSMENT
Patient complaining of dizziness, denies pain or shortness of breath. Vital signs stable.
Pt stable, other vitals are stable. Pt asymptomatic.
patient A&O X4, complains of dizziness upon ambulation/standing. Denies chest pain, sob, nausea or vomiting at this time
Pt A&O X4, CELIS and SOB noted during fecal incontinence episode. Denies chest pain, dizziness, nausea or vomiting. No acute distress noted
Pt vitals: Bp 106/64, HR 81, temp 99.0F, O2 100% on room air, 17 resp rate. Pt complains of dizziness, A&Ox4, no change in mental status. Morning BP meds given around 5:30 am.
Pt other vitals are stable, asymptomatic, and no s/s of acute distress at this time.

## 2021-12-31 NOTE — PROVIDER CONTACT NOTE (OTHER) - NAME OF MD/NP/PA/DO NOTIFIED:
Maya RIVERA
Conrad Trejo
Moncho Cifuentes
Med team [facility] Gabriel Morse
Neeraj RIVERA
Rafa Duke MD
Neeraj RIVERA

## 2021-12-31 NOTE — PROVIDER CONTACT NOTE (OTHER) - RECOMMENDATIONS
provider made aware, carvedilol medication given as ordered
See pt and assess.
provider made aware
As per LENO Morse, continue to monitor patient.
Hold coreg & losartan as per parameters.
See pt, continue to monitor.

## 2021-12-31 NOTE — PROGRESS NOTE ADULT - ATTENDING COMMENTS
Bilateral conjunctivitis, s/p ofloxacin gtts and Erythromycin ointment, c/w artificial tears   Possible chronic angle closure glaucoma, c/w Xalatan QHS OU, Cosopt BID OS, brimonidine TID OS, c/w Diamox 500mg BID, close ophtho f/up   Dysphagia, improved, empiric Fluconazole, tolerating regular diet, EGD as outpatient     COVID exposure, monitoring and stable  DC to [facility], d/c time 40 minutes Bilateral conjunctivitis, s/p ofloxacin gtts and Erythromycin ointment, c/w artificial tears   Possible chronic angle closure glaucoma, c/w Xalatan QHS OU, Cosopt BID OS, brimonidine TID OS, c/w Diamox 500mg BID, close ophtho f/up   Dysphagia, improved, empiric Fluconazole, tolerating regular diet, EGD as outpatient     Otitis externa, completed antibiotics   COVID exposure, monitoring and stable  DC to [facility], d/c time 40 minutes

## 2021-12-31 NOTE — PROVIDER CONTACT NOTE (OTHER) - SITUATION
Pt Blossom recent vitals /48
Pt Blossom complained of dizziness, unable to make it to commode, found in bed.
patient tachycardic up to 120s but stays usually / asymptomatic. Also his eyes are dilated by opthomatry so neuro exam is not WNL.
patient orthostatic HR positive
Pt morning Vitals /67
patient , and 120's on exertion. Pt had an episode of fecal incontinence. HR in the 120's on exertion (cleaning).
patient complaining of dizziness

## 2021-12-31 NOTE — PROGRESS NOTE ADULT - SUBJECTIVE AND OBJECTIVE BOX
~~~~~~~~~~~~~~~~~~~~~~~~~~~~~~~~~~  Conrad Trejo, PGY2  Pager [phone] (NS) [phone] ([facility])  After 7: Night Float Pager  ~~~~~~~~~~~~~~~~~~~~~~~~~~~~~~~~~~    PROGRESS NOTE:     Patient is a 71y old  Male who presents with a chief complaint of dizziness, ear pain (30 Dec 2021 08:18)      SUBJECTIVE / OVERNIGHT EVENTS:  -No acute events overnight  -Pending dispo    ADDITIONAL REVIEW OF SYSTEMS:    MEDICATIONS  (STANDING):  acetaZOLAMIDE    Tablet 500 milliGRAM(s) Oral two times a day  artificial  tears Solution 1 Drop(s) Both EYES four times a day  aspirin  chewable 81 milliGRAM(s) Oral daily  atorvastatin 40 milliGRAM(s) Oral at bedtime  brimonidine 0.2% Ophthalmic Solution 1 Drop(s) Left EYE three times a day  budesonide 160 MICROgram(s)/formoterol 4.5 MICROgram(s) Inhaler 2 Puff(s) Inhalation two times a day  carvedilol 12.5 milliGRAM(s) Oral every 12 hours  diphenoxylate/atropine 1 Tablet(s) Oral two times a day  dorzolamide 2%/timolol 0.5% Ophthalmic Solution 1 Drop(s) Left EYE every 12 hours  enoxaparin Injectable 40 milliGRAM(s) SubCutaneous daily  fluconAZOLE IVPB 200 milliGRAM(s) IV Intermittent every 24 hours  latanoprost 0.005% Ophthalmic Solution 1 Drop(s) Both EYES at bedtime  losartan 100 milliGRAM(s) Oral daily  meclizine 12.5 milliGRAM(s) Oral two times a day  montelukast 10 milliGRAM(s) Oral at bedtime  multivitamin 1 Tablet(s) Oral daily    MEDICATIONS  (PRN):  acetaminophen     Tablet .. 650 milliGRAM(s) Oral every 6 hours PRN Mild Pain (1 - 3), Moderate Pain (4 - 6)  ALBUTerol    90 MICROgram(s) HFA Inhaler 2 Puff(s) Inhalation every 6 hours PRN Shortness of Breath and/or Wheezing  traMADol 25 milliGRAM(s) Oral every 6 hours PRN Moderate Pain (4 - 6)      CAPILLARY BLOOD GLUCOSE      POCT Blood Glucose.: 80 mg/dL (30 Dec 2021 17:40)    I&O's Summary      PHYSICAL EXAM:  Vital Signs Last 24 Hrs  T(C): 36.5 (31 Dec 2021 05:46), Max: 37.2 (30 Dec 2021 18:50)  T(F): 97.7 (31 Dec 2021 05:46), Max: 99 (30 Dec 2021 18:50)  HR: 92 (31 Dec 2021 05:46) (88 - 94)  BP: 101/67 (31 Dec 2021 05:46) (101/67 - 122/63)  BP(mean): --  RR: 17 (31 Dec 2021 05:46) (17 - 18)  SpO2: 100% (31 Dec 2021 05:46) (98% - 100%)    CONSTITUTIONAL: NAD, well-developed  RESPIRATORY: Normal respiratory effort; lungs are clear to auscultation bilaterally  CARDIOVASCULAR: Regular rate and rhythm, normal S1 and S2, no murmur/rub/gallop; No lower extremity edema; Peripheral pulses are 2+ bilaterally  ABDOMEN: Nontender to palpation, normoactive bowel sounds, no rebound/guarding; No hepatosplenomegaly  MUSCLOSKELETAL: no clubbing or cyanosis of digits; no joint swelling or tenderness to palpation  PSYCH: A+O to person, place, and time; affect appropriate    LABS:                        10.9   6.05  )-----------( 491      ( 30 Dec 2021 14:19 )             39.9     12-30    134<L>  |  103  |  12  ----------------------------<  102<H>  4.8   |  20<L>  |  1.10    Ca    8.9      30 Dec 2021 14:19  Phos  3.0     12-30  Mg     2.20     12-30                  RADIOLOGY & ADDITIONAL TESTS:  Results Reviewed:   Imaging Personally Reviewed:  Electrocardiogram Personally Reviewed:    COORDINATION OF CARE:  Care Discussed with Consultants/Other Providers [Y/N]:  Prior or Outpatient Records Reviewed [Y/N]:

## 2021-12-31 NOTE — PROGRESS NOTE ADULT - PROVIDER SPECIALTY LIST ADULT
Gastroenterology
Gastroenterology
Ophthalmology
Gastroenterology
Internal Medicine

## 2021-12-31 NOTE — PROVIDER CONTACT NOTE (OTHER) - DATE AND TIME:
20-Dec-2021 14:55
29-Dec-2021 21:34
18-Dec-2021 14:06
19-Dec-2021 22:00
27-Dec-2021 07:30
31-Dec-2021 05:55
20-Dec-2021 19:50

## 2021-12-31 NOTE — PROGRESS NOTE ADULT - PROBLEM SELECTOR PROBLEM 1
Last vitals:   Vitals:    09/03/19 1201   BP: 107/73   Pulse: 150   Resp: 20   Temp: 36.9  C (98.4  F)   SpO2: 95%     Patient's level of consciousness is drowsy  Spontaneous respirations: yes  Maintains airway independently: yes  Dentition unchanged: yes  Oropharynx: oropharynx clear of all foreign objects and oral airway in place    QCDR Measures:  ASA# 20 - Surgical Safety Checklist: WHO surgical safety checklist completed prior to induction    PQRS# 430 - Adult PONV Prevention: NA - Not adult patient, not GA or 3 or more risk factors NOT present  ASA# 8 - Peds PONV Prevention: 4558F - Pt received => 2 anti-emetic agents (different classes) preop & intraop  PQRS# 424 - Paty-op Temp Management: 4559F - At least one body temp DOCUMENTED => 35.5C or 95.9F within required timeframe  PQRS# 426 - PACU Transfer Protocol: - Transfer of care checklist used  ASA# 14 - Acute Post-op Pain: ASA14B - Patient did NOT experience pain >= 7 out of 10   Bilateral conjunctivitis

## 2021-12-31 NOTE — PROGRESS NOTE ADULT - NUTRITIONAL ASSESSMENT
This patient has been assessed with a concern for Malnutrition and has been determined to have a diagnosis/diagnoses of Moderate protein-calorie malnutrition.    This patient is being managed with:   Diet NPO after Midnight-     NPO Start Date: 27-Dec-2021   NPO Start Time: 23:59  Entered: Dec 27 2021  1:55PM    Diet Soft and Bite Sized-  DASH/TLC {Sodium & Cholesterol Restricted} (DASH)  Entered: Dec 27 2021  7:46AM    
This patient has been assessed with a concern for Malnutrition and has been determined to have a diagnosis/diagnoses of Moderate protein-calorie malnutrition.    This patient is being managed with:   Diet DASH/TLC-  Sodium & Cholesterol Restricted  Entered: Dec 28 2021  9:59AM    
This patient has been assessed with a concern for Malnutrition and has been determined to have a diagnosis/diagnoses of Moderate protein-calorie malnutrition.    This patient is being managed with:   Diet Soft and Bite Sized-  DASH/TLC {Sodium & Cholesterol Restricted} (DASH)  Entered: Dec 27 2021  7:46AM    
This patient has been assessed with a concern for Malnutrition and has been determined to have a diagnosis/diagnoses of Moderate protein-calorie malnutrition.    This patient is being managed with:   Diet Full Liquid-  Entered: Dec 24 2021 10:24PM    
This patient has been assessed with a concern for Malnutrition and has been determined to have a diagnosis/diagnoses of Moderate protein-calorie malnutrition.    This patient is being managed with:   Diet DASH/TLC-  Sodium & Cholesterol Restricted  Entered: Dec 28 2021  9:59AM    
This patient has been assessed with a concern for Malnutrition and has been determined to have a diagnosis/diagnoses of Moderate protein-calorie malnutrition.    This patient is being managed with:   Diet Full Liquid-  Entered: Dec 24 2021 10:24PM    
This patient has been assessed with a concern for Malnutrition and has been determined to have a diagnosis/diagnoses of Moderate protein-calorie malnutrition.    This patient is being managed with:   Diet DASH/TLC-  Sodium & Cholesterol Restricted  Entered: Dec 28 2021  9:59AM    
This patient has been assessed with a concern for Malnutrition and has been determined to have a diagnosis/diagnoses of Moderate protein-calorie malnutrition.    This patient is being managed with:   Diet Regular-  Consistent Carbohydrate {Evening Snack} (CSTCHOSN)  Entered: Dec 20 2021  7:42AM

## 2021-12-31 NOTE — PROGRESS NOTE ADULT - REASON FOR ADMISSION
dizziness, ear pain

## 2021-12-31 NOTE — PROVIDER CONTACT NOTE (OTHER) - BACKGROUND
Patient admitted with otitis externa of left ear and dizziness/vertigo. Hx of colitis, DM, COPD and HTN.
Pt Blossom admitted for mal otitis externa of left ear. Hx of HTN, DM, COPD, afib
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 Pmhx of COPD, Asthma, afib, UC, HTN
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 Hx of COPD, Afib, asthma, UC
patient admitted for otitis externa of left ear
patient admitted for dizziness, otitis media

## 2021-12-31 NOTE — DISCHARGE NOTE NURSING/CASE MANAGEMENT/SOCIAL WORK - NSDCPEFALRISK_GEN_ALL_CORE
For information on Fall & Injury Prevention, visit: [email or web address] OR  [email or web address] OR  https://www.cdc.gov/steadi/patient.html

## 2021-12-31 NOTE — DISCHARGE NOTE NURSING/CASE MANAGEMENT/SOCIAL WORK - NSDCFUADDAPPT_GEN_ALL_CORE_FT
You have an appointment with Dr. Wynn on 1/3 at 1230 PM at the office below.    [facility] [place] [address]. Please make sure to coordinate ride to this appointment as it is extremely important for your care.

## 2021-12-31 NOTE — DISCHARGE NOTE NURSING/CASE MANAGEMENT/SOCIAL WORK - PATIENT PORTAL LINK FT
You can access the FollowMyHealth Patient Portal offered by [facility] by registering at the following website: [email or web address]. By joining [facility]’s FollowMyHealth portal, you will also be able to view your health information using other applications (apps) compatible with our system.

## 2021-12-31 NOTE — PROVIDER CONTACT NOTE (OTHER) - ACTION/TREATMENT ORDERED:
Coreg and losartan held as per parameters. Pt stable, will continue to monitor pt.
See pt and assess. Will continue to monitor.
will place patient on fluids, reassess  tomorrow morning
As per LENO Morse, continue to monitor patient.
Continue to monitor pt vitals.
patient HR is currently in the 90's post carvedilol medication, no new orders at this time

## 2021-12-31 NOTE — PROVIDER CONTACT NOTE (OTHER) - REASON
patient , and 120's on exertion
vitals
/67
patient orthostatic HR positive
/48
patient complaining of dizziness
Pt complains of dizziness

## 2022-04-14 NOTE — PROGRESS NOTE ADULT - PROBLEM SELECTOR PLAN 1
- pt reports left ear pain and swelling x 1 month  - pt is s/p PO bactrim outpatient   - CT concerning for necrotizing otitis externa  - pt is s/p IV Cipro and IV clinda in ER  - ENT following, rec Ciprofloxacin for chondritis to cover for pseudomonas  - Will continue IV Cipro    - F/U MRI head w/wo contrast - pt reports left ear pain and swelling x 1 month  - pt is s/p PO bactrim outpatient   - CT concerning for necrotizing otitis externa  - pt is s/p IV Cipro and IV clinda in ER  - ENT following, rec Ciprofloxacin for chondritis to cover for pseudomonas  - Will continue IV Cipro    - F/U MRI head/ICA w/wo contrast English

## 2023-01-25 NOTE — PROGRESS NOTE ADULT - PROBLEM SELECTOR PROBLEM 3
Impression: Myopia, bilateral: H52.13. Plan: New glasses RX and CL RX given today. Patient deferred DFE due to going to work. Thrombocytosis

## 2023-06-02 NOTE — PATIENT PROFILE ADULT - FALL HARM RISK - DEVICES
Called patient's dad and he requested that message be sent through [employer]. Message sent about stopping iron supplement through patient portal. Medication discontinued from patient medication list. Patient's father educated on continuing Vitamin D supplement and increasing hydration. Addressed father's concerns about ferritin. He was wondering what that lab was and it was explained to him. Father verbalized understanding and has no further questions at this time.   Cane
